# Patient Record
Sex: FEMALE | Race: WHITE | NOT HISPANIC OR LATINO
[De-identification: names, ages, dates, MRNs, and addresses within clinical notes are randomized per-mention and may not be internally consistent; named-entity substitution may affect disease eponyms.]

---

## 2017-08-17 ENCOUNTER — RX ONLY (RX ONLY)
Age: 62
End: 2017-08-17

## 2017-08-17 ENCOUNTER — APPOINTMENT (OUTPATIENT)
Dept: URBAN - METROPOLITAN AREA CLINIC 200 | Age: 62
Setting detail: DERMATOLOGY
End: 2017-08-18

## 2017-08-17 DIAGNOSIS — L30.4 ERYTHEMA INTERTRIGO: ICD-10-CM

## 2017-08-17 DIAGNOSIS — L57.8 OTHER SKIN CHANGES DUE TO CHRONIC EXPOSURE TO NONIONIZING RADIATION: ICD-10-CM

## 2017-08-17 DIAGNOSIS — L70.0 ACNE VULGARIS: ICD-10-CM

## 2017-08-17 PROBLEM — J30.1 ALLERGIC RHINITIS DUE TO POLLEN: Status: ACTIVE | Noted: 2017-08-17

## 2017-08-17 PROCEDURE — OTHER COUNSELING: OTHER

## 2017-08-17 PROCEDURE — 99213 OFFICE O/P EST LOW 20 MIN: CPT

## 2017-08-17 PROCEDURE — OTHER PRESCRIPTION MEDICATION MANAGEMENT: OTHER

## 2017-08-17 RX ORDER — NYSTATIN AND TRIAMCINOLONE ACETONIDE 100000; 1 [USP'U]/G; MG/G
CREAM TOPICAL
Qty: 1 | Refills: 3 | Status: ERX

## 2017-08-17 RX ORDER — TAZAROTENE 1 MG/G
GEL CUTANEOUS
Qty: 1 | Refills: 6 | Status: ERX

## 2017-08-17 ASSESSMENT — LOCATION SIMPLE DESCRIPTION DERM
LOCATION SIMPLE: RIGHT FOREHEAD
LOCATION SIMPLE: RIGHT UPPER BACK

## 2017-08-17 ASSESSMENT — LOCATION ZONE DERM
LOCATION ZONE: FACE
LOCATION ZONE: TRUNK

## 2017-08-17 ASSESSMENT — LOCATION DETAILED DESCRIPTION DERM
LOCATION DETAILED: RIGHT INFERIOR UPPER BACK
LOCATION DETAILED: RIGHT INFERIOR FOREHEAD

## 2018-03-06 ENCOUNTER — HOSPITAL ENCOUNTER (OUTPATIENT)
Dept: PHYSICAL THERAPY | Age: 63
Setting detail: THERAPIES SERIES
Discharge: HOME | End: 2018-03-06
Attending: FAMILY MEDICINE
Payer: COMMERCIAL

## 2018-03-06 DIAGNOSIS — M79.605 LEFT LEG PAIN: Primary | ICD-10-CM

## 2018-03-06 PROCEDURE — 97110 THERAPEUTIC EXERCISES: CPT | Mod: GP

## 2018-03-06 PROCEDURE — 97140 MANUAL THERAPY 1/> REGIONS: CPT | Mod: GP

## 2018-03-06 RX ORDER — NAPROXEN 500 MG/1
500 TABLET ORAL AS NEEDED
COMMUNITY
End: 2020-09-15

## 2018-03-07 NOTE — PROGRESS NOTES
Patient: Adeola Rees  Location: [facility] at [facility] - Physical Therapy    MRN: [id number]  Today's date: 3/6/2018    There were no vitals filed for this visit.               Treatment Summary - 03/06/18 1900        Session Details    Document Type (P)  daily treatment/progress note    Mode of Treatment (P)  individual therapy    Patient/Family Observations (P)  Stated she is feeling good today       Time Calculation    Start Time (P)  1800    Stop Time (P)  1900    Time Calculation (min) (P)  60 min       LE Sidelying Therapeutic Exercise    Comment, Sidelying Lower Extremity (P)  Hip Flexor Stretch        LE Supine Therapeutic Exercise    Comment (P)  Quad Sets, SLR 2x15 3 sec hold, Piriformis stretch        Aerobic Exercise Activity    Comment (P)  NuStep 10 min        PT Clinical Impression    Functional Limitations in Following Categories (PT Eval) (P)  community/leisure    Rehab Potential/Prognosis (P)  good, to achieve stated therapy goals    Estimated Length of Stay (P)  6 weeks    Daily Outcome Statement (P)  Continue PT       PT Weekly Outcome Summary    Functional Goal Overall Progress (P)  progressing toward functional goals as expected                 Education provided this session.   Clinical Impression: Adeola arrive to PT today feeling great, She was able to completed the NuStep for 10 min without complaint. Originally she was at seat 8, but started to feel pain in her knees. Adjusted the seat to level 9 and the pain decreased. She felt good while stretching her piriformis and hip flexors. She did have to take a rest break in between her SLR. She would benefit from continued PT for strengthening.

## 2018-03-08 ENCOUNTER — HOSPITAL ENCOUNTER (OUTPATIENT)
Dept: PHYSICAL THERAPY | Age: 63
Setting detail: THERAPIES SERIES
Discharge: HOME | End: 2018-03-08
Attending: FAMILY MEDICINE
Payer: COMMERCIAL

## 2018-03-08 DIAGNOSIS — M79.605 LEFT LEG PAIN: Primary | ICD-10-CM

## 2018-03-08 PROCEDURE — 97530 THERAPEUTIC ACTIVITIES: CPT | Mod: GP | Performed by: PHYSICAL THERAPIST

## 2018-03-08 PROCEDURE — 97110 THERAPEUTIC EXERCISES: CPT | Mod: GP

## 2018-03-08 PROCEDURE — 97110 THERAPEUTIC EXERCISES: CPT | Mod: GP | Performed by: PHYSICAL THERAPIST

## 2018-03-08 PROCEDURE — 97140 MANUAL THERAPY 1/> REGIONS: CPT | Mod: GP | Performed by: PHYSICAL THERAPIST

## 2018-03-08 NOTE — PROGRESS NOTES
Patient: Adeola Rees  Location: [facility] at [facility] - Physical Therapy    MRN: [id number]  Today's date: 3/8/2018    Vitals/Pain    03/08/18 1000   Presence of Pain: complains of pain/discomfort   Preferred Pain Scale: number (Numeric Rating Pain Scale)   Pain Body Location - Side: Left   Pain Body Location - Orientation: anterior   Pain Body Location: leg   Pain Rating (0-10): Rest: 3                  Treatment Summary - 03/08/18 1000        Session Details    Document Type daily treatment/progress note    Mode of Treatment physical therapy    Patient/Family Observations Patient reports improvement in legs strength since starting therapy.  Numbness is still the same.  Continued weakness left hip leading to difficulty with stairs and getting out of the chair.  No back pain.       Time Calculation    Start Time 1010    Stop Time 1104    Time Calculation (min) 54 min       Neuromuscular    Neuromuscular Assessments Neuromuscular Re-education (Group)       Neuromuscular Re-education    Activities/Techniques Used passive elongation    Positions Used supine;prone;side lying    Comment sciatica nerve glide on left x10; b/l femoral nerve glides 3x10; b/l piriformis strethc; b/l hip flexor ; b/l glut med stretch       Manual Muscle Testing (MMT)    General MMT Assessment lower extremity strength deficits identified       Lower Extremity    Lower Extremity: Manual Muscle Testing right LE strength is WNL;left hip strength deficit       MMT: Left Hip    Gross Movement: Flexion (5/5) normal    Gross Movement: Extension (4/5) good    Gluteus Diego: Extension (4/5) good    Gluteus Medius: ABduction (4/5) good    Comment Quads and hamstrings 4+/5       Gait Training    Comment March with pause 4x 20 feet; challenged with balance       Therapeutic Exercise    Therapeutic Exercise Aerobic Exercise Activity (Group)       LE Supine Therapeutic Exercise    Comment SLR 2 sets of 10; green band; Sidelying hip  ABDx10 pink band        Aerobic Exercise Activity    Comment Nustep seat 10 arms 10 ; 10 minutes level 1       PT Clinical Impression    Plan For Care Reviewed: Physical Therapy PT plan for care discussed with patient    System Pathology/Pathophysiology Noted musculoskeletal    Impairments Found (PT Eval) aerobic capacity/endurance;gait, locomotion, and balance;muscle performance;ROM (range of motion)   strength    Functional Limitations in Following Categories (PT Eval) home management;work;community/leisure    Rehab Potential/Prognosis good, to achieve stated therapy goals    Frequency of Treatment other (see comments)   2x/wk    Estimated Length of Stay 6 weeks    Problem List decreased flexibility;decreased ROM;decreased strength;impaired balance;pain;other (see comments)   abnormal gait    Activity Limitations Related to Problem List ambulation not performed safely       PT Weekly Outcome Summary    Functional Goal Overall Progress progressing toward functional goals as expected    Weekly Outcome Statement Patient was seen for re-assessment today and demonstrated excellent progress towards goals with negative SLR on left  today, improved LE flexibility and improved LE strength. Patient continues to present with moderate hip flexor and quadriceps strength on left leading to inability to ambulate safely and inablility to perform stairs safely.  Patient requires continued skilled PT to progress strength to decrease risk for falls.    Impairments Continuing to Limit Function decreased flexibility;decreased strength;impaired balance    Recommendations Cont PT;focus on balance, LE strength and flexibility                 Education provided this session. Disucssed progress towards goals; reviewed HEP

## 2018-03-13 ENCOUNTER — HOSPITAL ENCOUNTER (OUTPATIENT)
Dept: PHYSICAL THERAPY | Age: 63
Setting detail: THERAPIES SERIES
Discharge: HOME | End: 2018-03-13
Attending: FAMILY MEDICINE
Payer: COMMERCIAL

## 2018-03-13 DIAGNOSIS — M79.605 LEFT LEG PAIN: Primary | ICD-10-CM

## 2018-03-13 PROCEDURE — 97140 MANUAL THERAPY 1/> REGIONS: CPT | Mod: GP

## 2018-03-13 PROCEDURE — 97110 THERAPEUTIC EXERCISES: CPT | Mod: GP

## 2018-03-13 NOTE — PROGRESS NOTES
Patient: Adeola Rees  Location: [facility] - Physical Therapy    MRN: [id number]  Today's date: 3/13/2018          Pain/Vitals - 03/13/18 1100        Pain/Comfort/Sleep    Presence of Pain complains of pain/discomfort    Preferred Pain Scale number (Numeric Rating Pain Scale)    Pain Body Location - Side Left    Pain Body Location - Orientation posterior    Pain Body Location calf    Pain Rating (0-10): Rest 8                     Treatment Summary - 03/13/18 1100        Session Details    Document Type daily treatment/progress note    Mode of Treatment individual therapy    Patient/Family Observations Reports she is feeling good today but feels a burning up her left calf.        Time Calculation    Start Time (P)  1100    Stop Time (P)  1200    Time Calculation (min) (P)  60 min       Neuromuscular Re-education    Activities/Techniques Used (P)  passive elongation    Positions Used (P)  supine;prone;side lying    Comment (P)  sciatica nerve glide on left x10; b/l femoral nerve glides 3x10; b/l piriformis strethc; b/l hip flexor ; b/l glut med stretch       LE Supine Therapeutic Exercise    Comment (P)  SLR 2 sets of 10; green band; Sidelying hip ABDx10 pink band        Aerobic Exercise Activity    Comment (P)  NuStep seat 7 arms 10 Level 1 10 min .        PT Clinical Impression    Plan For Care Reviewed: Physical Therapy (P)  PT plan for care discussed with patient    System Pathology/Pathophysiology Noted (P)  musculoskeletal    Impairments Found (PT Eval) (P)  aerobic capacity/endurance;gait, locomotion, and balance    Functional Limitations in Following Categories (PT Eval) (P)  home management;work;community/leisure    Rehab Potential/Prognosis (P)  good, to achieve stated therapy goals    Frequency of Treatment (P)  other (see comments)    Estimated Length of Stay (P)  6 weeks    Problem List (P)  decreased flexibility    Activity Limitations Related to Problem List (P)   ambulation not performed safely                   Education provided this session.   Assessment: Adeola tolerated her exercises well today. Stated she was feeling a stretch while on the Nustep and was still getting the burning feeling up her left calf. Once adjust the set to level 7 the pain became minimal. She would benefit from continued PT for strengthening and flexibility gains.

## 2018-03-15 ENCOUNTER — HOSPITAL ENCOUNTER (OUTPATIENT)
Dept: PHYSICAL THERAPY | Age: 63
Setting detail: THERAPIES SERIES
Discharge: HOME | End: 2018-03-15
Attending: FAMILY MEDICINE
Payer: COMMERCIAL

## 2018-03-15 DIAGNOSIS — M79.605 LEFT LEG PAIN: Primary | ICD-10-CM

## 2018-03-15 PROCEDURE — 97140 MANUAL THERAPY 1/> REGIONS: CPT | Mod: GP | Performed by: PHYSICAL THERAPIST

## 2018-03-15 PROCEDURE — 97110 THERAPEUTIC EXERCISES: CPT | Mod: GP | Performed by: PHYSICAL THERAPIST

## 2018-03-15 NOTE — PROGRESS NOTES
Patient: Adeola Rees  Location: [facility] - Physical Therapy    MRN: [id number]  Today's date: 3/15/2018        Prior Living Environment            Prior Level of Function                   Treatment Summary - 03/15/18 1000        Session Details    Document Type daily treatment    Mode of Treatment physical therapy    Patient/Family Observations Patient reports she is improving but continues to have difficulty on stairs       Time Calculation    Start Time 1025    Stop Time 1100    Time Calculation (min) 35 min       Neuromuscular Re-education    Activities/Techniques Used passive elongation    Positions Used supine;prone;side lying    Comment sciatica nerve glide on left x10; b/l femoral nerve glides 3x10; b/l piriformis strethc; b/l hip flexor ; b/l glut med stretch; manual therapy provided by PT       Gait Training    Comment March with pause 4x 20 feet; challenged with balance       Stairs Training    Comment Step up x10; lateral step up x10; required Airex when lateral step up on left due to decreased strength (airex to decrease step depth)       LE Standing Therapeutic Exercise    Comment Hip ABD, Hip Ext standing Green band 2x10; squats into chair       LE Supine Therapeutic Exercise    Comment SLR 2 sets of 10; green band; Sidelying hip ABDx10 Green band        PT Weekly Outcome Summary    Functional Goal Overall Progress progressing toward functional goals as expected    Weekly Outcome Statement Patient demonstrated improved flexibility but continues to require neural mobility  especially femoral nerve glides.  Patient also continues to present with signficiant left > right loss of quad flexibility and continues to require manual intervention to restore ROM.  Patient unable to perform lateral step ups leading with the right LE. and requires continued focus on LE strength to improve stair negotiation    Recommendations continue to progress CKC strength                    Education provided this session. See the Patient Education summary report for full details.

## 2018-03-20 ENCOUNTER — HOSPITAL ENCOUNTER (OUTPATIENT)
Dept: PHYSICAL THERAPY | Age: 63
Setting detail: THERAPIES SERIES
Discharge: HOME | End: 2018-03-20
Attending: FAMILY MEDICINE
Payer: COMMERCIAL

## 2018-03-20 DIAGNOSIS — M79.605 LEFT LEG PAIN: Primary | ICD-10-CM

## 2018-03-20 PROCEDURE — 97112 NEUROMUSCULAR REEDUCATION: CPT | Mod: GP

## 2018-03-20 NOTE — PROGRESS NOTES
Patient: Adeola Rees  Location: [facility] - Physical Therapy    MRN: [id number]  Today's date: 3/20/2018          Pain/Vitals - 03/20/18 1100        Pain/Comfort/Sleep    Presence of Pain complains of pain/discomfort    Preferred Pain Scale number (Numeric Rating Pain Scale)    Pain Body Location - Side Bilateral    Pain Body Location - Orientation anterior    Pain Body Location leg    Pain Rating (0-10): Rest 4                PT Treatment Summary - 03/20/18 1100        Session Details    Document Type daily treatment    Mode of Treatment individual therapy    Patient/Family Observations Stated she is feeling pretty good. Having more pain her her LLE, she feels this is due to her bowels being full.  Says she needs to leave a little early because her daughter is sick.        Time Calculation    Start Time 1117    Stop Time (P)  1155    Time Calculation (min) (P)  38 min       Neuromuscular Re-education    Activities/Techniques Used passive elongation    Positions Used side lying;supine;prone    Comment sciatica nerve glide on left x10; b/l femoral nerve glides 3x10; b/l piriformis strethc; b/l hip flexor ; b/l glut med stretch; manual therapy provided by PT       Aerobic Exercise Activity    Comment (P)  NuStep Seat 9. 10 min        PT Weekly Outcome Summary    Functional Goal Overall Progress progressing toward functional goals as expected    Weekly Outcome Statement (P)  Adeola arrived to PT today feeling good overall. Did have some discomfort in her right knee during the Nustep but stated she would be able to tolerate it. Only had enough time to perform stretches today due to pt being late and having to leave early for her daughters doctors appointment. She tolerated all stretches well with no complaints of pain. She would benefit from continued PT for flexibility and strengthening.              Goals     • LTG PT            Long Term Goals Time Frame Result Progress   Left LE  MMT to 5/5 8 weeks       Patient will able to perform moderate lifting activities without pain 8 weeks     Patient will be able to squat to  item from floor 8 weeks     FGA  26/30 for decreased fall risk  8 weeks     I in ADLs 8 weeks     Patient will perform stairs reciprocally by DC 8 weeks               • STG PT              Short Term Goals Time Frame Result Progress   Patient will be educated into initial HEP 4 weeks met    All STG met in pervious documentation system   See last re-evaluation

## 2018-03-27 ENCOUNTER — HOSPITAL ENCOUNTER (OUTPATIENT)
Dept: PHYSICAL THERAPY | Age: 63
Setting detail: THERAPIES SERIES
Discharge: HOME | End: 2018-03-27
Attending: FAMILY MEDICINE
Payer: COMMERCIAL

## 2018-03-27 DIAGNOSIS — M79.605 LEFT LEG PAIN: Primary | ICD-10-CM

## 2018-03-27 PROCEDURE — 97112 NEUROMUSCULAR REEDUCATION: CPT | Mod: GP

## 2018-03-27 PROCEDURE — 97110 THERAPEUTIC EXERCISES: CPT | Mod: GP

## 2018-03-27 NOTE — PROGRESS NOTES
Patient: Adeola Rees  Location: [facility] - Physical Therapy    MRN: [id number]  Today's date: 3/27/2018          Pain/Vitals - 03/27/18 1100        Pain/Comfort/Sleep    Presence of Pain denies                PT Treatment Summary - 03/27/18 1100        Time Calculation    Start Time (P)  1103       Neuromuscular Re-education    Comment (P)  sciatica nerve glide on left x10; b/l femoral nerve glides 3x10; b/l piriformis strethc; b/l hip flexor ; b/l glut med stretch.        LE Standing Therapeutic Exercise    Comment (P)  Hip ABD, Hip Ext standing Green band 2x10; squats into chair        Aerobic Exercise Activity    Comment (P)  [employer] recumbent bike 10 min,        PT Weekly Outcome Summary    Functional Goal Overall Progress (P)  progressing toward functional goals as expected    Weekly Outcome Statement (P)  Adeola arrived to PT today for the first time without pain. She tolerated all stretches and exercises well. Stated she started to have muscle fatigue with Hip abd and ext with GTB but was able to complete. She would benefit from continued PT for strengthening.

## 2018-03-29 ENCOUNTER — HOSPITAL ENCOUNTER (OUTPATIENT)
Dept: PHYSICAL THERAPY | Age: 63
Setting detail: THERAPIES SERIES
Discharge: HOME | End: 2018-03-29
Attending: FAMILY MEDICINE
Payer: COMMERCIAL

## 2018-03-29 DIAGNOSIS — M79.605 LEFT LEG PAIN: Primary | ICD-10-CM

## 2018-03-29 PROCEDURE — 97530 THERAPEUTIC ACTIVITIES: CPT | Mod: GP | Performed by: PHYSICAL THERAPIST

## 2018-03-29 PROCEDURE — 97140 MANUAL THERAPY 1/> REGIONS: CPT | Mod: GP | Performed by: PHYSICAL THERAPIST

## 2018-03-29 PROCEDURE — 97110 THERAPEUTIC EXERCISES: CPT | Mod: GP | Performed by: PHYSICAL THERAPIST

## 2018-03-29 NOTE — LETTER
[address]  [place] [address]  [facility] Therapy Fax: [phone]    PHYSICAL THERAPY PLAN OF CARE    By signing this plan of care, I certify this plan of care as correct and necessary for the patient.        Physician Signature: _________________________________________ Date: _________________      Patient Name: Adeola Rees        Provider: Dagmar Doyle PT  Referring Provider: Bryce Allen DO  PCP: Bryce Allen DO  Payor: Payor: [employer] / Plan: [employer] POS / Product Type: Managed Care /   Medical Diagnosis: Left leg pain [M79.605]          Frequency: other (see comments) (1-2x/wk)  Duration: 6 weeks    Certification dates:  From:  3/29/18 TO:  5/24/18      Clinical Impression: Patient was seen for re-assessment and demonstrated progress since the SOC with increased strength, increased flexibility and improved ambulation tolernace. Patient continues to be challenged with significant left LE weaness leading to difficulty with stair negoatiation.  Patient continues to present as a fall risk based on FGA and requies continued focus on strength and flexibility as well as balance and gait to improve ambulation tolerance and to decrease risk for fall.  Patient may benefit from further imaging of the femoral nerve due to persistent weakness and unusual gait pattern with episodes of knee buckling without fall.     Patient Complaints and Deficits: Weakness and pain    Plan and Interventions: The patient's treatment will include gait training, joint and soft tissue mobilization, manual therapy, neuromuscular re-education, physical reconditioning, therapeutic activities, therapeutic exercises and attended E-Stim as needed and modalities as needed for pain       Rehab Potential: good, to achieve stated therapy goals      Patient: Adeola Rees  Location: [facility] - Physical Therapy    MRN: [id number]  Today's date: 3/29/2018          Pain/Vitals -  03/29/18 1000        Pain/Comfort/Sleep    Presence of Pain denies                        PT Treatment Summary - 03/29/18 1000        Session Details    Document Type re-evaluation    Mode of Treatment physical therapy;individual therapy    Patient/Family Observations Left leg continues to feel weak.  Still having difficulty getting out of a chair without UE support.  Compliant with HEP (squats); Left thigh pain can be 10/10 if touched, 0/10 at rest.  Left anterior shin is numb and left leg continues to feel heavy. Patient reports improvement in stair negotiation but continues to require rail.  Patient requires shopping cart to shop due to weakness in the left leg.       Time Calculation    Start Time 1010    Stop Time 1105    Time Calculation (min) 55 min       Neuromuscular Re-education    Comment sciatica nerve glide on left x10; b/l femoral nerve glides 3x10; b/l piriformis strethc; b/l hip flexor ; b/l glut med stretch. ; manual stretch performed by PT Janet       Range of Motion (ROM)    Comment, General Range of Motion Right quad 110 degree; Left quad 100 degrees       Lower Extremity    Lower Extremity: Manual Muscle Testing left knee strength deficit    Comment Right LE 5/5 except hip ext 3/5       MMT: Left Hip    Gross Movement: Flexion (5/5) normal   muscle cramp reported    Gross Movement: Extension (2+/5) poor plus    Gross Movement: Hip ABduction (4/5) good    Gross Movement: Internal (Medial) Rotation (3+/5) fair plus    Gross Movement: External (Lateral) Rotation (3+/5) fair plus       MMT: Left Knee    Gross Movement: Extension (3+/5) fair plus    Hamstrings: Flexion (4-/5) good minus       Gait Training    Comment March with pause 4x 20 feet; challenged with balance;; ; retro walk; lateral walk 4x 20 feetFGA; 18/30       LE Sidelying Therapeutic Exercise    Comment, Sidelying Lower Extremity s/l hip ABD 20 x       Aerobic Exercise Activity    Time 10    Comment Nustep level 2; seat 9 arms 8        PT Clinical Impression    Plan For Care Reviewed: Physical Therapy PT plan for care discussed with patient    System Pathology/Pathophysiology Noted musculoskeletal    Impairments Found (PT Eval) other (see comments)   decreased strength, decreased flexibility, abnormal gait    Functional Limitations in Following Categories (PT Eval) home management    Rehab Potential/Prognosis good, to achieve stated therapy goals    Frequency of Treatment other (see comments)   1-2x/wk    Estimated Length of Stay 6 weeks    Problem List pain;decreased ROM;decreased flexibility;decreased strength;impaired balance;other (see comments)   abnormal gait    Activity Limitations Related to Problem List ambulation not performed safely    Patient/Family Concerns Related to Equipment Needs Patient was seen for re-assessment and demonstrated progress since the SOC with increased strength, increased flexibility and improved ambulation tolernace. Patient continues to be challenged with significant left LE weaness leading to difficulty with stair negoatiation.  Patient continues to present as a fall risk based on FGA and requies continued focus on strength and flexibility as well as balance and gait to improve ambulation tolerance and to decrease risk for fall.                   Education provided this session. Discussed progress towards goals       Goals     None                  Thank you for this referral. Please contact our department with any questions.        Thank you,   Dagmar Doyle, PT, DPT, CERT MDT

## 2018-03-29 NOTE — PROGRESS NOTES
Patient: Adeola Rees  Location: [facility] - Physical Therapy    MRN: [id number]  Today's date: 3/29/2018          Pain/Vitals - 03/29/18 1000        Pain/Comfort/Sleep    Presence of Pain denies                        PT Treatment Summary - 03/29/18 1000        Session Details    Document Type re-evaluation    Mode of Treatment physical therapy;individual therapy    Patient/Family Observations Left leg continues to feel weak.  Still having difficulty getting out of a chair without UE support.  Compliant with HEP (squats); Left thigh pain can be 10/10 if touched, 0/10 at rest.  Left anterior shin is numb and left leg continues to feel heavy. Patient reports improvement in stair negotiation but continues to require rail.  Patient requires shopping cart to shop due to weakness in the left leg.       Time Calculation    Start Time 1010    Stop Time 1105    Time Calculation (min) 55 min       Neuromuscular Re-education    Comment sciatica nerve glide on left x10; b/l femoral nerve glides 3x10; b/l piriformis strethc; b/l hip flexor ; b/l glut med stretch. ; manual stretch performed by PT Janet       Range of Motion (ROM)    Comment, General Range of Motion Right quad 110 degree; Left quad 100 degrees       Lower Extremity    Lower Extremity: Manual Muscle Testing left knee strength deficit    Comment Right LE 5/5 except hip ext 3/5       MMT: Left Hip    Gross Movement: Flexion (5/5) normal   muscle cramp reported    Gross Movement: Extension (2+/5) poor plus    Gross Movement: Hip ABduction (4/5) good    Gross Movement: Internal (Medial) Rotation (3+/5) fair plus    Gross Movement: External (Lateral) Rotation (3+/5) fair plus       MMT: Left Knee    Gross Movement: Extension (3+/5) fair plus    Hamstrings: Flexion (4-/5) good minus       Gait Training    Comment March with pause 4x 20 feet; challenged with balance;; ; retro walk; lateral walk 4x 20 feetFGA; 18/30       LE  Sidelying Therapeutic Exercise    Comment, Sidelying Lower Extremity s/l hip ABD 20 x       Aerobic Exercise Activity    Time 10    Comment Nustep level 2; seat 9 arms 8       PT Clinical Impression    Plan For Care Reviewed: Physical Therapy PT plan for care discussed with patient    System Pathology/Pathophysiology Noted musculoskeletal    Impairments Found (PT Eval) other (see comments)   decreased strength, decreased flexibility, abnormal gait    Functional Limitations in Following Categories (PT Eval) home management    Rehab Potential/Prognosis good, to achieve stated therapy goals    Frequency of Treatment other (see comments)   1-2x/wk    Estimated Length of Stay 6 weeks    Problem List pain;decreased ROM;decreased flexibility;decreased strength;impaired balance;other (see comments)   abnormal gait    Activity Limitations Related to Problem List ambulation not performed safely    Patient/Family Concerns Related to Equipment Needs Patient was seen for re-assessment and demonstrated progress since the SOC with increased strength, increased flexibility and improved ambulation tolernace. Patient continues to be challenged with significant left LE weaness leading to difficulty with stair negoatiation.  Patient continues to present as a fall risk based on FGA and requies continued focus on strength and flexibility as well as balance and gait to improve ambulation tolerance and to decrease risk for fall.                   Education provided this session. Discussed progress towards goals       Goals     None

## 2018-04-03 ENCOUNTER — HOSPITAL ENCOUNTER (OUTPATIENT)
Dept: PHYSICAL THERAPY | Age: 63
Setting detail: THERAPIES SERIES
Discharge: HOME | End: 2018-04-03
Attending: FAMILY MEDICINE
Payer: COMMERCIAL

## 2018-04-03 DIAGNOSIS — M79.605 LEFT LEG PAIN: Primary | ICD-10-CM

## 2018-04-03 PROCEDURE — 97112 NEUROMUSCULAR REEDUCATION: CPT | Mod: GP

## 2018-04-03 PROCEDURE — 97110 THERAPEUTIC EXERCISES: CPT | Mod: GP

## 2018-04-03 NOTE — PROGRESS NOTES
"Patient: Adeola Rees  Location: [facility] - Physical Therapy    MRN: [id number]  Today's date: 4/3/2018        PT Treatment Summary - 04/03/18 1300        Session Details    Document Type daily treatment    Mode of Treatment physical therapy    Patient/Family Observations Running late today. Feeling like she is \"dragging\" today.        Time Calculation    Start Time 1322    Stop Time 1402    Time Calculation (min) 40 min       Neuromuscular Re-education    Comment sciatica nerve glide on left x10; b/l femoral nerve glides 3x10; b/l piriformis strethc; b/l hip flexor ; b/l glut med stretch..        Aerobic Exercise Activity    Time 10    Comment Nustep level 2; seat 9 arms 8       PT Weekly Outcome Summary    Functional Goal Overall Progress progressing toward functional goals as expected    Weekly Outcome Statement Arrived to PT late today tolerated all stretches without complaints of pain. Unable to complete further exercises due to shortend time. She would benefit from continued PT for strengthening.                   "

## 2018-04-05 ENCOUNTER — HOSPITAL ENCOUNTER (OUTPATIENT)
Dept: PHYSICAL THERAPY | Age: 63
Setting detail: THERAPIES SERIES
Discharge: HOME | End: 2018-04-05
Attending: FAMILY MEDICINE
Payer: COMMERCIAL

## 2018-04-05 DIAGNOSIS — M79.605 LEFT LEG PAIN: Primary | ICD-10-CM

## 2018-04-05 PROCEDURE — 97112 NEUROMUSCULAR REEDUCATION: CPT | Mod: GP

## 2018-04-05 PROCEDURE — 97110 THERAPEUTIC EXERCISES: CPT | Mod: GP

## 2018-04-05 NOTE — PROGRESS NOTES
Patient: Adeola Rees  Location: [facility] at [facility] - Physical Therapy    MRN: [id number]  Today's date: 4/5/2018    Denies pain           PT Treatment Summary - 04/05/18 1200        Session Details    Document Type daily treatment    Mode of Treatment physical therapy    Patient/Family Observations Running late. Stated her mind is great but her leg is good. Weak and doesnt want to work.        Time Calculation    Start Time 1212    Stop Time (P)  1303    Time Calculation (min) (P)  51 min       Neuromuscular Re-education    Comment sciatica nerve glide on left x10; b/l femoral nerve glides 3x10; b/l piriformis strethc; b/l hip flexor ; b/l glut med stretch..        LE Sidelying Therapeutic Exercise    Comment, Sidelying Lower Extremity s/l hip ABD 20x       Aerobic Exercise Activity    Time 10    Comment Nustep level 2; seat 9 arms 8       PT Weekly Outcome Summary    Functional Goal Overall Progress progressing toward functional goals as expected    Weekly Outcome Statement (P)  Arrived to PT late. Pt stated upon arrival that she hasnt eaten a thing since Tuesday except for water. Stated this is due to fasting for her Hindu. Also stated she had plans of eating eggs this morning but got in to a verbal altercation with one of her freinds on the phone and afterwards decided she didnt feel like eating. Checked pts BP and it was higher than expected. Adeola explained that it may be due to the coffee she had and the Nicotine gum shes chewing. Discussed the following concerns with Primary PT. Had discussion with Adeola about proper nutrition and why its important for the body to have food. Stated she feels better when her stomach is empty because when her bowels arent full she has no pain in her left leg. Adeola was able to complete all of her stretches without complaints of pain. As well as completing her Hip Abd exercise. She would benefit from continued PT for strengthening.      Recommendations (P)  Continue PT

## 2018-04-10 ENCOUNTER — HOSPITAL ENCOUNTER (OUTPATIENT)
Dept: PHYSICAL THERAPY | Age: 63
Setting detail: THERAPIES SERIES
Discharge: HOME | End: 2018-04-10
Attending: FAMILY MEDICINE
Payer: COMMERCIAL

## 2018-04-10 DIAGNOSIS — M79.605 LEFT LEG PAIN: Primary | ICD-10-CM

## 2018-04-10 PROCEDURE — 97110 THERAPEUTIC EXERCISES: CPT | Mod: GP

## 2018-04-10 PROCEDURE — 97112 NEUROMUSCULAR REEDUCATION: CPT | Mod: GP

## 2018-04-10 NOTE — PROGRESS NOTES
Patient: Adeola Rees  Location: [facility] - Physical Therapy    MRN: [id number]  Today's date: 4/10/2018          Pain/Vitals - 04/10/18 1100        Pain/Comfort/Sleep    Presence of Pain denies              PT Treatment Summary - 04/10/18 1000        Session Details    Document Type daily treatment    Mode of Treatment physical therapy    Patient/Family Observations Good mentally and gimpy on my legs. Everything is the same.  Had a good breakfast this morning.         Time Calculation    Start Time 1022    Stop Time 1121    Time Calculation (min) 59 min       Neuromuscular Re-education    Comment sciatica nerve glide on left x10; b/l femoral nerve glides 3x10; b/l piriformis strethc; b/l hip flexor ; b/l glut med stretch..        LE Sidelying Therapeutic Exercise    Comment, Sidelying Lower Extremity s/l hip ABD 20x       LE Standing Therapeutic Exercise    Comment Hip ABD, Hip Ext standing Green band 2x10; squats into chair, Stair climbing x4, Lateral step downs 2x10       Aerobic Exercise Activity    Time 10    Comment Nustep level 2; seat 9 arms 8       PT Weekly Outcome Summary    Functional Goal Overall Progress progressing toward functional goals as expected    Weekly Outcome Statement (P)  Arrived to PT late, no excuse given. Having a difficult time treating patient due to her consistantly arriving late. Explained to Bharat the importance of arriving to her PT session on time to maximize her benefit from PT. Did not have a scheduled patient after Adeola, was able to fit in full session today. Admitted she is not being as compliant with her HEP and she should be. Continues to show weakness in her Left LE. After completing step downs she stated her knee felt gimpy and started to give out. Felt it was over worked. Stopped session to prevent furhter muscle fatigue. Asked patient if she needed assistance out to her car. She stated she was fine and whenever this happens she  needs to just stand still for a moment. She would benefit from continued PT for strengthening.     Recommendations Continue PT

## 2018-04-12 ENCOUNTER — HOSPITAL ENCOUNTER (OUTPATIENT)
Dept: PHYSICAL THERAPY | Age: 63
Setting detail: THERAPIES SERIES
Discharge: HOME | End: 2018-04-12
Attending: FAMILY MEDICINE
Payer: COMMERCIAL

## 2018-04-12 DIAGNOSIS — M79.605 LEFT LEG PAIN: Primary | ICD-10-CM

## 2018-04-12 PROCEDURE — 97140 MANUAL THERAPY 1/> REGIONS: CPT | Mod: GP | Performed by: PHYSICAL THERAPIST

## 2018-04-12 PROCEDURE — 97110 THERAPEUTIC EXERCISES: CPT | Mod: GP | Performed by: PHYSICAL THERAPIST

## 2018-04-12 PROCEDURE — 97530 THERAPEUTIC ACTIVITIES: CPT | Mod: GP,59 | Performed by: PHYSICAL THERAPIST

## 2018-04-12 NOTE — PROGRESS NOTES
"Patient: Adeola Rees  Location: [facility] - Physical Therapy    MRN: [id number]  Today's date: 4/12/2018          Pain/Vitals - 04/12/18 1400        Pain/Comfort/Sleep    Presence of Pain denies                PT Treatment Summary - 04/12/18 1400        Session Details    Document Type daily treatment    Mode of Treatment physical therapy;individual therapy    Patient/Family Observations Patient reports continued heaviness left LE.  \"Leg feels gimpy\" . Worse since Tuesday.  Having difficulty with stairs over the last few days.        Time Calculation    Start Time 1402    Stop Time 1502    Time Calculation (min) 60 min       Neuromuscular Re-education    Comment femoral nerve glide on left 3x10; hip flexor stretch in sidelying; piriformis stretch all on left; IASTM left quad: STM left quad; perfromed by Dagmar PT 25 minute       Gait Training    Comment March with pause 4x 20 feet       Stairs Training    Comment Step up x10; lateral step up x10; required Airex when lateral step up on left due to decreased strength (airex to decrease step depth; able to ascend stairs reciprocally without rail; step to pattern for descent       LE Sidelying Therapeutic Exercise    Comment, Sidelying Lower Extremity s/l hip ABD with ext x15; sidelying clams pink band x20 (left leg on top)       LE Standing Therapeutic Exercise    Comment Squats into chairs 20x; tKE pink band x20 LEft LE       LE Supine Therapeutic Exercise    Comment Bridge with hip abduction (pink band) x20       Aerobic Exercise Activity    Time 10    Comment Nustep level 2; seat 9 arms 8       PT Weekly Outcome Summary    Functional Goal Overall Progress progressing toward functional goals as expected    Weekly Outcome Statement Patient responded well to IASTM to left quad with decreased pain reported. Patient demnstrated improved ability on the stairs with reciprocal gait on the stair for ascent for the first time, required " step to pattern for descent. Patient requires continued focus on strength progression to improve ability on stairs.    Recommendations Continue soft tissue to left quad; progress strength                   Education provided this session. Encouraged patient to continue with HEP and f/u with MD       Goals     None

## 2018-04-17 ENCOUNTER — HOSPITAL ENCOUNTER (OUTPATIENT)
Dept: PHYSICAL THERAPY | Age: 63
Setting detail: THERAPIES SERIES
Discharge: HOME | End: 2018-04-17
Attending: FAMILY MEDICINE
Payer: COMMERCIAL

## 2018-04-17 ENCOUNTER — OFFICE VISIT (OUTPATIENT)
Dept: PRIMARY CARE | Facility: CLINIC | Age: 63
End: 2018-04-17
Payer: COMMERCIAL

## 2018-04-17 VITALS
HEIGHT: 67 IN | RESPIRATION RATE: 12 BRPM | WEIGHT: 184.4 LBS | HEART RATE: 74 BPM | DIASTOLIC BLOOD PRESSURE: 80 MMHG | BODY MASS INDEX: 28.94 KG/M2 | OXYGEN SATURATION: 98 % | SYSTOLIC BLOOD PRESSURE: 116 MMHG

## 2018-04-17 DIAGNOSIS — M79.605 LEFT LEG PAIN: Primary | ICD-10-CM

## 2018-04-17 DIAGNOSIS — M79.605 LEFT LEG PAIN: ICD-10-CM

## 2018-04-17 DIAGNOSIS — R20.0 LEFT LEG NUMBNESS: Primary | ICD-10-CM

## 2018-04-17 PROCEDURE — 99214 OFFICE O/P EST MOD 30 MIN: CPT | Performed by: FAMILY MEDICINE

## 2018-04-17 PROCEDURE — 97110 THERAPEUTIC EXERCISES: CPT | Mod: GP

## 2018-04-17 PROCEDURE — 97112 NEUROMUSCULAR REEDUCATION: CPT | Mod: GP

## 2018-04-17 ASSESSMENT — ENCOUNTER SYMPTOMS
CARDIOVASCULAR NEGATIVE: 1
GASTROINTESTINAL NEGATIVE: 1
LEG PAIN: 1
EYES NEGATIVE: 1
CONSTITUTIONAL NEGATIVE: 1
MUSCULOSKELETAL NEGATIVE: 1
RESPIRATORY NEGATIVE: 1
NUMBNESS: 1

## 2018-04-17 NOTE — PATIENT INSTRUCTIONS
Patient Education   Pain Without a Known Cause  Pain can occur in any part of the body and can range from mild to severe. Sometimes no cause can be found for why you are having pain. Some types of pain that can occur without a known cause include:  · Headache.  · Back pain.  · Abdominal pain.  · Neck pain.  How is this diagnosed?  Your health care provider will try to find the cause of your pain. This may include:  · Physical exam.  · Medical history.  · Blood tests.  · Urine tests.  · X-rays.  If no cause is found, your health care provider may diagnose you with pain without a known cause.  How is this treated?  Treatment depends on the kind of pain you have. Your health care provider may prescribe medicines to help relieve your pain.  Follow these instructions at home:  · Take medicines only as directed by your health care provider.  · Stop any activities that cause pain. During periods of severe pain, bed rest may help.  · Try to reduce your stress with activities such as yoga or meditation. Talk to your health care provider for other stress-reducing activity recommendations.  · Exercise regularly, if approved by your health care provider.  · Eat a healthy diet that includes fruits and vegetables. This may improve pain. Talk to your health care provider if you have any questions about your diet.  Contact a health care provider if:  If you have a painful condition and no reason can be found for the pain or the pain gets worse, it is important to follow up with your health care provider. It may be necessary to repeat tests and look further for a possible cause.  This information is not intended to replace advice given to you by your health care provider. Make sure you discuss any questions you have with your health care provider.  Document Released: 09/12/2002 Document Revised: 05/25/2017 Document Reviewed: 05/05/2015  Elsevier Interactive Patient Education © 2018 [employer]

## 2018-04-17 NOTE — PROGRESS NOTES
Patient: Adeola Rees  Location: [facility] - Physical Therapy    MRN: [id number]  Today's date: 4/17/2018          Pain/Vitals - 04/17/18 1400        Pain/Comfort/Sleep    Presence of Pain complains of pain/discomfort    Preferred Pain Scale number (Numeric Rating Pain Scale)    Pain Body Location - Side Left    Pain Body Location leg    Pain Rating (0-10): Activity 4                PT Treatment Summary - 04/17/18 1400        Session Details    Document Type daily treatment    Mode of Treatment physical therapy;individual therapy    Patient/Family Observations Forgot about her appointment this morning and moved to 2pm. Just came back from the Primary Care Doctor. Stated he would like her to see a Neurologist Dr. Nieves at [place]. Cut all of her socks due to feeling like theres a very tight rubber band around her leg. Didnt do her exercises yesterday.        Time Calculation    Start Time 1359    Stop Time 1502    Time Calculation (min) 63 min       Neuromuscular Re-education    Comment femoral nerve glide on left 3x10; hip flexor stretch in sidelying; piriformis stretch all on left; IASTM left quad: STM left quad;       LE Sidelying Therapeutic Exercise    Comment, Sidelying Lower Extremity s/l hip ABD with ext x15; sidelying clams pink band x20 (left leg on top)       LE Standing Therapeutic Exercise    Comment Squats into chairs 20x; tKE pink band x20 LEft LE       LE Supine Therapeutic Exercise    Comment Bridge with hip abduction (pink band) x20       Aerobic Exercise Activity    Time 10    Comment Nustep level 2; seat 9 arms 8       PT Weekly Outcome Summary    Functional Goal Overall Progress progressing toward functional goals as expected    Weekly Outcome Statement (P)  Adeola arrived to PT today with increased pain in her Left lower leg. Completed her warm up on the NuStep without complaints of pain. Tolerated light pressure with IASTM with edge tool to Left quad to try  and loosen up muscle. Patient verbalized she was in no pain durring this process. Continued with some table exercises before moving to standing closed chain. After getting up from the table Adeola presented with a limp of her LLE giving out on her. Discussed with Primary PT who stated to continue exercises due to her need for strength. Tolerated all other exercises without complaint. She would benefit from continued PT to prevent further pain.

## 2018-04-17 NOTE — PROGRESS NOTES
"Subjective      Patient ID: Adeolatriny Rees is a 62 y.o. female.    Leg Pain    Associated symptoms include numbness.   Still has weakness and numbness in her left lower leg and weakness in the hip.States that her progress has plateaued.        The following have been reviewed and updated as appropriate in this visit:  Tobacco  Allergies  Meds  Med Hx  Surg Hx  Fam Hx  Soc Hx      Review of Systems   Constitutional: Negative.    HENT: Negative.    Eyes: Negative.    Respiratory: Negative.    Cardiovascular: Negative.    Gastrointestinal: Negative.    Genitourinary: Negative.    Musculoskeletal: Negative.    Neurological: Positive for numbness.     Active Ambulatory Problems     Diagnosis Date Noted   • Left leg pain 03/13/2018   • Left leg numbness 04/17/2018     Resolved Ambulatory Problems     Diagnosis Date Noted   • No Resolved Ambulatory Problems     Past Medical History:   Diagnosis Date   • Osteoarthritis        Current Outpatient Prescriptions:   •  naproxen (NAPROSYN) 500 mg tablet, Take 500 mg by mouth as needed.  , Disp: , Rfl:   Allergies   Allergen Reactions   • Ciprofibrate    • Ciprofloxacin      Other reaction(s): Muscular pain   • Penicillin G    • Penicillins      Other reaction(s): Anaphylaxis   • Shellfish Derived Hives   • Sulfa (Sulfonamide Antibiotics)        Objective     Vitals:    04/17/18 1223   BP: 116/80   BP Location: Right upper arm   Patient Position: Sitting   Pulse: 74   Resp: 12   SpO2: 98%   Weight: 83.6 kg (184 lb 6.4 oz)   Height: 1.689 m (5' 6.5\")     Body mass index is 29.32 kg/m².    Physical Exam    Assessment/Plan   Problem List Items Addressed This Visit     Left leg pain    Relevant Orders    Ambulatory referral to Neurology    Left leg numbness - Primary    Relevant Orders    Ambulatory referral to Neurology          "

## 2018-04-18 NOTE — ADDENDUM NOTE
Encounter addended by: Dagmar Doyle PT on: 4/18/2018 11:59 AM<BR>    Actions taken: Letter status changed

## 2018-04-19 ENCOUNTER — HOSPITAL ENCOUNTER (OUTPATIENT)
Dept: PHYSICAL THERAPY | Age: 63
Setting detail: THERAPIES SERIES
Discharge: HOME | End: 2018-04-19
Attending: FAMILY MEDICINE
Payer: COMMERCIAL

## 2018-04-19 DIAGNOSIS — M79.605 LEFT LEG PAIN: Primary | ICD-10-CM

## 2018-04-19 PROCEDURE — 97110 THERAPEUTIC EXERCISES: CPT | Mod: GP

## 2018-04-19 NOTE — PROGRESS NOTES
"Patient: Adeola Rees  Location: [facility] - Physical Therapy    MRN: [id number]  Today's date: 4/19/2018          Pain/Vitals - 04/19/18 1000        Pain/Comfort/Sleep    Presence of Pain complains of pain/discomfort    Preferred Pain Scale number (Numeric Rating Pain Scale)    Pain Body Location - Side Left    Pain Body Location leg    Pain Rating (0-10): Activity 8                PT Treatment Summary - 04/19/18 1000        Session Details    Document Type daily treatment    Mode of Treatment physical therapy    Patient/Family Observations In a lot of pain today, Feels it is coming from the lateral left knee. Her knee has been \"gimpy\".       Time Calculation    Start Time 1010    Stop Time 1102    Time Calculation (min) 52 min       Neuromuscular Re-education    Comment femoral nerve glide on left 3x10; hip flexor stretch in sidelying; piriformis stretch all on left; IASTM left quad: STM left quad;       LE Sidelying Therapeutic Exercise    Comment, Sidelying Lower Extremity s/l hip ABD with ext x15; sidelying clams pink band x20 (left leg on top)       LE Standing Therapeutic Exercise    Comment Squats into chairs 20x; tKE pink band x20 LEft LE       LE Supine Therapeutic Exercise    Comment Bridge with hip abduction (pink band) x20       Aerobic Exercise Activity    Time 10    Comment Nustep level 2; seat 9 arms 8       PT Weekly Outcome Summary    Functional Goal Overall Progress progressing toward functional goals as expected    Weekly Outcome Statement (P)  Adeola arrived with increased pain today, She stated she feels this is from working out so hard the previous session.  She also stated her leg has been gimpy ever since leaving the last session. Will discuss with Primary PT.  Tolerated IASTM well verbalized that she was in no pain durring the application. Completed all of her exercises well. Stated she was not going to perform them to the best of her ability. Due to fear " of making her leg more gimpy. She would benefit from continued PT for strengthening.

## 2018-04-24 ENCOUNTER — HOSPITAL ENCOUNTER (OUTPATIENT)
Dept: PHYSICAL THERAPY | Age: 63
Setting detail: THERAPIES SERIES
Discharge: HOME | End: 2018-04-24
Attending: FAMILY MEDICINE
Payer: COMMERCIAL

## 2018-04-24 DIAGNOSIS — M79.605 LEFT LEG PAIN: Primary | ICD-10-CM

## 2018-04-24 PROCEDURE — 97110 THERAPEUTIC EXERCISES: CPT | Mod: GP

## 2018-04-24 PROCEDURE — 97112 NEUROMUSCULAR REEDUCATION: CPT | Mod: GP

## 2018-04-24 NOTE — PROGRESS NOTES
Patient: Adeola Rees  Location: [facility] - Physical Therapy    MRN: [id number]  Today's date: 4/24/2018    Pain 0/10          PT Treatment Summary - 04/24/18 0900        Session Details    Document Type daily treatment    Mode of Treatment physical therapy    Patient/Family Observations Leg is feeling gimpy today, brought a cane because she feels she needs it.        Time Calculation    Start Time 0959    Stop Time (P)  1100    Time Calculation (min) (P)  61 min       Neuromuscular Re-education    Comment femoral nerve glide on left 3x10; hip flexor stretch in sidelying; piriformis stretch all on left; IASTM left quad: STM left quad       LE Sidelying Therapeutic Exercise    Comment, Sidelying Lower Extremity s/l hip ABD with ext x20; sidelying clams with manual resistance x20       LE Standing Therapeutic Exercise    Comment Squats into chairs 20x; tKE pink band x20 LEft LE       LE Supine Therapeutic Exercise    Comment Bridge with hip abduction (pink band) x20       Aerobic Exercise Activity    Time 10    Comment Nustep level 2; seat 9 arms 8       PT Weekly Outcome Summary    Functional Goal Overall Progress progressing toward functional goals as expected    Weekly Outcome Statement (P)  Adeola arrived using her cane this morning stating that her leg has been feeling very gimpy. Will alert primary PT.  Tolerated NuStep and exercises well. Also tolerated IASTM stated she was having no pain during application. Continues to present with weakness. Is going to call the Neurologist to make and appointment soon. Would benefit from continued PT.

## 2018-04-27 ENCOUNTER — HOSPITAL ENCOUNTER (OUTPATIENT)
Dept: PHYSICAL THERAPY | Age: 63
Setting detail: THERAPIES SERIES
Discharge: HOME | End: 2018-04-27
Attending: FAMILY MEDICINE
Payer: COMMERCIAL

## 2018-04-27 DIAGNOSIS — M79.605 LEFT LEG PAIN: Primary | ICD-10-CM

## 2018-04-27 PROCEDURE — 97110 THERAPEUTIC EXERCISES: CPT | Mod: GP

## 2018-05-03 ENCOUNTER — HOSPITAL ENCOUNTER (OUTPATIENT)
Dept: PHYSICAL THERAPY | Age: 63
Setting detail: THERAPIES SERIES
Discharge: HOME | End: 2018-05-03
Attending: FAMILY MEDICINE
Payer: COMMERCIAL

## 2018-05-03 DIAGNOSIS — M79.605 LEFT LEG PAIN: Primary | ICD-10-CM

## 2018-05-03 PROCEDURE — 97110 THERAPEUTIC EXERCISES: CPT | Mod: GP

## 2018-05-03 PROCEDURE — 97530 THERAPEUTIC ACTIVITIES: CPT | Mod: GP

## 2018-05-03 NOTE — PROGRESS NOTES
"Patient: Adeola Rees  Location: [facility] - Physical Therapy    MRN: [id number]  Today's date: 5/3/2018          Pain/Vitals - 05/03/18 1500        Pain/Comfort/Sleep    Presence of Pain denies          Prior Living Environment            Prior Level of Function              PT Treatment Summary - 05/03/18 1200        Session Details    Document Type discharge evaluation    Mode of Treatment individual therapy;physical therapy    Patient/Family Observations Patient reporting that she wants to discharge and has an appointment scheduled with neurologist on Monday.  She reports she continues to have difficulty with transitional movements and reports a feeling that L LE will  \"give\".       Time Calculation    Start Time 1205    Stop Time 1300    Time Calculation (min) 55 min       Light Touch Sensation Assessment    Left Lower Extremity: Light Touch Sensation Assessment other (see comments)    Additional Details: Light Touch Sensation Assessment decreased sensation to LT in L3-L4 and L4-L5 dermatomes; reflexes - L=+1, R= 2+ DTR of patellar tendon       MMT: Left Hip    Gross Movement: Flexion (3+/5) fair plus    Gross Movement: Extension (3+/5) fair plus    Gross Movement: Hip ABduction (3+/5) fair plus       MMT: Left Knee    Gross Movement: Extension (4/5) good    Gross Movement: Flexion (4/5) good       LE Sidelying Therapeutic Exercise    Comment, Sidelying Lower Extremity s/l hip ABD with ext x20; sidelying clams x20;         LE Standing Therapeutic Exercise    Comment STS 1x10       Aerobic Exercise Activity    Exercise(s) Performed stepper, recumbent with upper extremities    Time 10       PT Clinical Impression    Plan For Care Reviewed: Physical Therapy patient voices agreement with PT plan for care;PT plan for care discussed with caregiver    Impairments Found (PT Eval) ergonomics and body mechanics;gait, locomotion, and balance;joint integrity and mobility;motor " "function;muscle performance;ROM (range of motion)    Rehab Potential/Prognosis good, to achieve stated therapy goals;adequate, monitor progress closely    Problem List abnormal muscle tone;decreased flexibility;decreased ROM;decreased strength;pain    Daily Outcome Statement Patient requesting to terminate PT.  Patient continues to demonstrate weakness in L LE and core and frequent complaints of L LE \"giving way\" or \"feeling gimpy\".  To be noted patient does have decreased sensation to LT in L3-L4, L4-L5 dermatomes and decreased DTR in L patellar tendon.  Recommend patient follow through on visit with neurologist to determine course of care.  Patient agreeable.         PT Weekly Outcome Summary    Functional Goal Overall Progress --    Impairments Continuing to Limit Function --    Recommendations Discharge PT                           Goals     • LTG PT            Long Term Goals Time Frame Result Progress   Left LE MMT to 5/5 8 weeks Not MET      Patient will able to perform moderate lifting activities without pain 8 weeks D/c    Patient will be able to squat to  item from floor 8 weeks D/c    FGA  26/30 for decreased fall risk  8 weeks D/c    I in ADLs 8 weeks Met    Patient will perform stairs reciprocally by DC 8 weeks Not Met              • STG PT              Short Term Goals Time Frame Result Progress   Patient will be educated into initial HEP 4 weeks met    All STG met in pervious documentation system   See last re-evaluation                  "

## 2018-05-03 NOTE — LETTER
[address]  [place] [address]  [facility] OP Therapy Fax: [phone]    PHYSICAL THERAPY PLAN OF CARE    By signing this plan of care, I certify this plan of care as correct and necessary for the patient.        Physician Signature: _________________________________________ Date: _________________      Patient Name: Adeola Rees        Provider: Deyanira Bell, PT  Referring Provider: Bryce Allen DO  PCP: Bryce Allen DO  Payor: Payor: [employer] / Plan: [employer] POS / Product Type: Managed Care /   Medical Diagnosis: Left leg pain [M79.605]     Discharge    Clinical Impression: Patient requesting to terminate PT.  Patient continues to demonstrate weakness in L LE and core and frequent complaints of L LE “giving way” or “feeling gimpy”.  To be noted patient does have decreased sensation to LT in L3-L4, L4-L5 dermatomes and decreased DTR in L patellar tendon.  Recommend patient follow through on visit with neurologist to determine course of care.  Patient agreeable.      Patient Complaints and Deficits: decreased Strength    Plan and Interventions:   Therapeutic exercise  Therapeutic activity  Neuro-reeducation  Manual therapy    Rehab Potential: good, to achieve stated therapy goals, adequate, monitor progress closely      Patient: Adeola Rees  Location: [facility] - Physical Therapy    MRN: [id number]  Today's date: 5/3/2018          Pain/Vitals - 05/03/18 1500        Pain/Comfort/Sleep    Presence of Pain denies          Prior Living Environment            Prior Level of Function              PT Treatment Summary - 05/03/18 1200        Session Details    Document Type discharge evaluation    Mode of Treatment individual therapy;physical therapy    Patient/Family Observations Patient reporting that she wants to discharge and has an appointment scheduled with neurologist on Monday.  She reports she continues to have difficulty with transitional  "movements and reports a feeling that L LE will  \"give\".       Time Calculation    Start Time 1205    Stop Time 1300    Time Calculation (min) 55 min       Light Touch Sensation Assessment    Left Lower Extremity: Light Touch Sensation Assessment other (see comments)    Additional Details: Light Touch Sensation Assessment decreased sensation to LT in L3-L4 and L4-L5 dermatomes; reflexes - L=+1, R= 2+ DTR of patellar tendon       MMT: Left Hip    Gross Movement: Flexion (3+/5) fair plus    Gross Movement: Extension (3+/5) fair plus    Gross Movement: Hip ABduction (3+/5) fair plus       MMT: Left Knee    Gross Movement: Extension (4/5) good    Gross Movement: Flexion (4/5) good       LE Sidelying Therapeutic Exercise    Comment, Sidelying Lower Extremity s/l hip ABD with ext x20; sidelying clams x20;         LE Standing Therapeutic Exercise    Comment STS 1x10       Aerobic Exercise Activity    Exercise(s) Performed stepper, recumbent with upper extremities    Time 10       PT Clinical Impression    Plan For Care Reviewed: Physical Therapy patient voices agreement with PT plan for care;PT plan for care discussed with caregiver    Impairments Found (PT Eval) ergonomics and body mechanics;gait, locomotion, and balance;joint integrity and mobility;motor function;muscle performance;ROM (range of motion)    Rehab Potential/Prognosis good, to achieve stated therapy goals;adequate, monitor progress closely    Problem List abnormal muscle tone;decreased flexibility;decreased ROM;decreased strength;pain    Daily Outcome Statement Patient requesting to terminate PT.  Patient continues to demonstrate weakness in L LE and core and frequent complaints of L LE \"giving way\" or \"feeling gimpy\".  To be noted patient does have decreased sensation to LT in L3-L4, L4-L5 dermatomes and decreased DTR in L patellar tendon.  Recommend patient follow through on visit with neurologist to determine course of care.  Patient agreeable.         PT " Weekly Outcome Summary    Functional Goal Overall Progress --    Impairments Continuing to Limit Function --    Recommendations Discharge PT                           Goals     • LTG PT            Long Term Goals Time Frame Result Progress   Left LE MMT to 5/5 8 weeks Not MET      Patient will able to perform moderate lifting activities without pain 8 weeks D/c    Patient will be able to squat to  item from floor 8 weeks D/c    FGA  26/30 for decreased fall risk  8 weeks D/c    I in ADLs 8 weeks Met    Patient will perform stairs reciprocally by DC 8 weeks Not Met              • STG PT              Short Term Goals Time Frame Result Progress   Patient will be educated into initial HEP 4 weeks met    All STG met in pervious documentation system   See last re-evaluation                            Thank you for this referral. Please contact our department with any questions.        Thank you,   Deyanira Bell, PT

## 2018-07-08 ENCOUNTER — HOSPITAL ENCOUNTER (OUTPATIENT)
Facility: CLINIC | Age: 63
Discharge: HOME | End: 2018-07-08
Attending: INTERNAL MEDICINE
Payer: COMMERCIAL

## 2018-07-08 VITALS
WEIGHT: 175 LBS | TEMPERATURE: 98.5 F | RESPIRATION RATE: 15 BRPM | BODY MASS INDEX: 27.47 KG/M2 | HEIGHT: 67 IN | HEART RATE: 86 BPM | OXYGEN SATURATION: 98 % | DIASTOLIC BLOOD PRESSURE: 70 MMHG | SYSTOLIC BLOOD PRESSURE: 120 MMHG

## 2018-07-08 DIAGNOSIS — L03.116 CELLULITIS OF LEFT LOWER EXTREMITY: Primary | ICD-10-CM

## 2018-07-08 PROCEDURE — S9083 URGENT CARE CENTER GLOBAL: HCPCS | Performed by: INTERNAL MEDICINE

## 2018-07-08 PROCEDURE — 99213 OFFICE O/P EST LOW 20 MIN: CPT | Performed by: INTERNAL MEDICINE

## 2018-07-08 RX ORDER — CLINDAMYCIN HYDROCHLORIDE 300 MG/1
300 CAPSULE ORAL 3 TIMES DAILY
Qty: 21 CAPSULE | Refills: 0 | Status: SHIPPED | OUTPATIENT
Start: 2018-07-08 | End: 2018-07-15

## 2018-07-08 RX ORDER — GENTAMICIN SULFATE 3 MG/ML
SOLUTION/ DROPS OPHTHALMIC
Refills: 0 | COMMUNITY
Start: 2018-04-01 | End: 2020-09-15

## 2018-07-08 ASSESSMENT — ENCOUNTER SYMPTOMS
SHORTNESS OF BREATH: 0
VOMITING: 0
TROUBLE SWALLOWING: 0
NAUSEA: 0
FEVER: 0

## 2018-07-08 NOTE — ED PROVIDER NOTES
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  Started with itch    Raised lesion    Allergies; PCN, sulfa  But can take amoxil        History provided by:  Patient   used: No    Rash   Onset quality:  Sudden  Duration:  2 days  Chronicity:  New  Context: insect bite/sting    Exacerbated by: pressure/touch.  Ineffective treatments:  Antibiotic cream (hot compresses, epsom salt soaks)  Associated symptoms: no fever, no nausea, no shortness of breath and not vomiting        Past Medical History:   Diagnosis Date   • Osteoarthritis        Past Surgical History:   Procedure Laterality Date   • APPENDECTOMY     • HYSTERECTOMY     • OOPHORECTOMY     • TONSILLECTOMY         Family History   Problem Relation Age of Onset   • Hypertension Mother    • Hypertension Father    • Thyroid cancer Sister        Social History   Substance Use Topics   • Smoking status: Never Smoker   • Smokeless tobacco: Never Used   • Alcohol use No       Review of Systems   Constitutional: Negative for fever.   HENT: Negative for trouble swallowing.    Respiratory: Negative for shortness of breath.    Cardiovascular: Negative for chest pain.   Gastrointestinal: Negative for nausea and vomiting.   Skin: Positive for rash.   Allergic/Immunologic: Positive for environmental allergies.       Physical Exam  ED Triage Vitals [07/08/18 1332]   Temp Heart Rate Resp BP SpO2   36.9 °C (98.5 °F) 86 15 120/70 98 %      Temp Source Heart Rate Source Patient Position BP Location FiO2 (%) (Set)   Oral Monitor Sitting Left upper arm --       Physical Exam   Constitutional: She appears well-nourished. No distress.   Eyes: Pupils are equal, round, and reactive to light. Right eye exhibits no discharge. Left eye exhibits no discharge.   Slight inflammation Right upper lid.  No scleral injection    Cardiovascular: Normal rate and regular rhythm.    No murmur heard.  Pulmonary/Chest: Effort normal and breath sounds normal. No respiratory distress. She has no wheezes.   Musculoskeletal: She exhibits no edema.   Skin: Skin is warm and dry. She is not diaphoretic.   Over the medial aspect just below the left knee is a subcentimeter papule O pustule-like lesion minimally tender with some slight induration but no fluctuance.  There is no drainage or bleeding noted.  Surrounding it is an approximate 2 x 3 cm area of erythema.  There is no proximal streaking noted.  No clinical joint effusion of the knee adjacent to the lesion.   Psychiatric: She has a normal mood and affect. Her behavior is normal.   Vitals reviewed.        Procedures  Procedures    UC Course  ED Course          Clinical Impressions as of Jul 08 1347   Cellulitis of left lower extremity       [facility]               Jeb Perdomo, DO  07/08/18 1347       Jeb Perdomo, DO  07/08/18 134

## 2018-07-08 NOTE — DISCHARGE INSTRUCTIONS
Please continue with the warm compresses and soaks as you have been.  Please be careful to not burn herself by putting it too hot or too long on the area.  Please begin the antibiotics and take as directed.  It is 3 times a day for 1 week.  You may want to consider a probiotic in between dosages of the prescription antibiotic.  Please follow-up with your primary care physician later this week if the area is not improving especially after being on the antibiotics for a couple of days.

## 2018-07-10 ENCOUNTER — OFFICE VISIT (OUTPATIENT)
Dept: PRIMARY CARE | Facility: CLINIC | Age: 63
End: 2018-07-10
Payer: COMMERCIAL

## 2018-07-10 VITALS
OXYGEN SATURATION: 96 % | HEART RATE: 86 BPM | RESPIRATION RATE: 12 BRPM | TEMPERATURE: 98.5 F | BODY MASS INDEX: 27.82 KG/M2 | WEIGHT: 175 LBS | SYSTOLIC BLOOD PRESSURE: 126 MMHG | DIASTOLIC BLOOD PRESSURE: 80 MMHG
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
## 2018-07-10 PROCEDURE — 99213 OFFICE O/P EST LOW 20 MIN: CPT | Performed by: FAMILY MEDICINE

## 2018-07-10 ASSESSMENT — ENCOUNTER SYMPTOMS
MUSCULOSKELETAL NEGATIVE: 1
EYES NEGATIVE: 1
CONSTITUTIONAL NEGATIVE: 1
RESPIRATORY NEGATIVE: 1
CARDIOVASCULAR NEGATIVE: 1
NEUROLOGICAL NEGATIVE: 1

## 2018-07-10 NOTE — PATIENT INSTRUCTIONS
Patient Education     Insect Bite, Adult  An insect bite can make your skin red, itchy, and swollen. Some insects can spread disease to people with a bite. However, most insect bites do not lead to disease, and most are not serious.  Follow these instructions at home:  Bite area care  · Do not scratch the bite area.  · Keep the bite area clean and dry.  · Wash the bite area every day with soap and water as told by your doctor.  · Check the bite area every day for signs of infection. Check for:  ¨ More redness, swelling, or pain.  ¨ Fluid or blood.  ¨ Warmth.  ¨ Pus.  Managing pain, itching, and swelling  · You may put any of these on the bite area as told by your doctor:  ¨ A baking soda paste.  ¨ Cortisone cream.  ¨ Calamine lotion.  · If directed, put ice on the bite area.  ¨ Put ice in a plastic bag.  ¨ Place a towel between your skin and the bag.  ¨ Leave the ice on for 20 minutes, 2-3 times a day.  Medicines  · Take medicines or put medicines on your skin only as told by your doctor.  · If you were prescribed an antibiotic medicine, use it as told by your doctor. Do not stop using the antibiotic even if your condition improves.  General instructions  · Keep all follow-up visits as told by your doctor. This is important.  How is this prevented?  To help you have a lower risk of insect bites:  · When you are outside, wear clothing that covers your arms and legs.  · Use insect repellent. The best insect repellents have:  ¨ An active ingredient of DEET, picaridin, oil of lemon eucalyptus (OLE), or [id number].  ¨ Higher amounts of DEET or another active ingredient than other repellents have.  · If your home windows do not have screens, think about putting some in.  Contact a doctor if:  · You have more redness, swelling, or pain in the bite area.  · You have fluid, blood, or pus coming from the bite area.  · The bite area feels warm.  · You have a fever.  Get help right away if:  · You have joint pain.  · You have a  rash.  · You have shortness of breath.  · You feel more tired or sleepy than you normally do.  · You have neck pain.  · You have a headache.  · You feel weaker than you normally do.  · You have chest pain.  · You have pain in your belly.  · You feel sick to your stomach (nauseous) or you throw up (vomit).  Summary  · An insect bite can make your skin red, itchy, and swollen.  · Do not scratch the bite area, and keep it clean and dry.  · Ice can help with pain and itching from the bite.  This information is not intended to replace advice given to you by your health care provider. Make sure you discuss any questions you have with your health care provider.  Document Released: 12/15/2001 Document Revised: 07/20/2017 Document Reviewed: 05/04/2016  [employer] Interactive Patient Education © 2018 Elsevier Inc.

## 2018-07-10 NOTE — PROGRESS NOTES
Subjective      Patient ID: Adeolabello Rees is a 62 y.o. female.    HPI Spider bite behind left knee. Was started on clindamycin 2 days ago from [facility] and although patient has stopped the medication it looks a lot better.    The following have been reviewed and updated as appropriate in this visit:  Tobacco  Allergies  Meds  Med Hx  Surg Hx  Fam Hx  Soc Hx      Review of Systems   Constitutional: Negative.    HENT: Negative.    Eyes: Negative.    Respiratory: Negative.    Cardiovascular: Negative.    Genitourinary: Negative.    Musculoskeletal: Negative.    Neurological: Negative.      Active Ambulatory Problems     Diagnosis Date Noted   • Left leg pain 03/13/2018   • Left leg numbness 04/17/2018   • Bite, insect 07/10/2018     Resolved Ambulatory Problems     Diagnosis Date Noted   • No Resolved Ambulatory Problems     Past Medical History:   Diagnosis Date   • Osteoarthritis        Current Outpatient Prescriptions:   •  clindamycin (CLEOCIN) 300 mg capsule, Take 1 capsule (300 mg total) by mouth 3 (three) times a day for 7 days., Disp: 21 capsule, Rfl: 0  •  gentamicin (GARAMYCIN) 0.3 % ophthalmic solution, INT 1 GTT IN EACH EYE QID, Disp: , Rfl: 0  •  naproxen (NAPROSYN) 500 mg tablet, Take 500 mg by mouth as needed.  , Disp: , Rfl:   Allergies   Allergen Reactions   • Ciprofibrate    • Ciprofloxacin      Other reaction(s): Muscular pain   • Penicillin G    • Penicillins      Other reaction(s): Anaphylaxis   • Shellfish Derived Hives   • Sulfa (Sulfonamide Antibiotics)        Objective     Vitals:    07/10/18 1341   BP: 126/80   Pulse: 86   Resp: 12   Temp: 36.9 °C (98.5 °F)   SpO2: 96%   Weight: 79.4 kg (175 lb)     Body mass index is 27.82 kg/m².    Physical Exam   Constitutional: She is oriented to person, place, and time. She appears well-developed and well-nourished.   HENT:   Head: Normocephalic.   Cardiovascular: Normal rate and regular rhythm.    Pulmonary/Chest: Effort normal.   Abdominal:  Soft.   Musculoskeletal: Normal range of motion.   Neurological: She is alert and oriented to person, place, and time.   Skin: Skin is warm.   Infected spider bite 90% improved. Continue with neosporin and soaks.   Nursing note and vitals reviewed.      Assessment/Plan   Problem List Items Addressed This Visit     Bite, insect - Primary

## 2018-08-23 ENCOUNTER — RX ONLY (RX ONLY)
Age: 63
End: 2018-08-23

## 2018-08-23 ENCOUNTER — APPOINTMENT (OUTPATIENT)
Dept: URBAN - METROPOLITAN AREA CLINIC 200 | Age: 63
Setting detail: DERMATOLOGY
End: 2018-09-07

## 2018-08-23 DIAGNOSIS — L70.0 ACNE VULGARIS: ICD-10-CM

## 2018-08-23 DIAGNOSIS — L30.4 ERYTHEMA INTERTRIGO: ICD-10-CM

## 2018-08-23 DIAGNOSIS — L57.8 OTHER SKIN CHANGES DUE TO CHRONIC EXPOSURE TO NONIONIZING RADIATION: ICD-10-CM

## 2018-08-23 PROBLEM — D23.5 OTHER BENIGN NEOPLASM OF SKIN OF TRUNK: Status: ACTIVE | Noted: 2018-08-23

## 2018-08-23 PROCEDURE — OTHER COUNSELING: OTHER

## 2018-08-23 PROCEDURE — 99213 OFFICE O/P EST LOW 20 MIN: CPT

## 2018-08-23 PROCEDURE — OTHER PRESCRIPTION: OTHER

## 2018-08-23 PROCEDURE — OTHER REASSURANCE: OTHER

## 2018-08-23 PROCEDURE — OTHER RECOMMENDATIONS: OTHER

## 2018-08-23 RX ORDER — SODIUM SULFACETAMIDE AND SULFUR 80; 40 MG/ML; MG/ML
LOTION TOPICAL
Qty: 1 | Refills: 6 | Status: ERX | COMMUNITY
Start: 2018-08-23

## 2018-08-23 RX ORDER — TAZAROTENE 1 MG/G
CREAM CUTANEOUS
Qty: 1 | Refills: 6 | Status: ERX

## 2018-08-23 RX ORDER — TAZAROTENE 1 MG/G
CREAM CUTANEOUS
Qty: 1 | Refills: 6 | Status: ERX | COMMUNITY
Start: 2018-08-23

## 2018-08-23 ASSESSMENT — LOCATION DETAILED DESCRIPTION DERM
LOCATION DETAILED: RIGHT MEDIAL BREAST 5-6:00 REGION
LOCATION DETAILED: LEFT INFERIOR CENTRAL MALAR CHEEK
LOCATION DETAILED: LEFT MEDIAL SUPERIOR CHEST

## 2018-08-23 ASSESSMENT — PAIN INTENSITY VAS: HOW INTENSE IS YOUR PAIN 0 BEING NO PAIN, 10 BEING THE MOST SEVERE PAIN POSSIBLE?: NO PAIN

## 2018-08-23 ASSESSMENT — LOCATION SIMPLE DESCRIPTION DERM
LOCATION SIMPLE: CHEST
LOCATION SIMPLE: RIGHT BREAST
LOCATION SIMPLE: LEFT CHEEK

## 2018-08-23 ASSESSMENT — SEVERITY ASSESSMENT OVERALL AMONG ALL PATIENTS
IN YOUR EXPERIENCE, AMONG ALL PATIENTS YOU HAVE SEEN WITH THIS CONDITION, HOW SEVERE IS THIS PATIENT'S CONDITION?: MULTIPLE INFLAMMATORY LESIONS BUT NONINFLAMMATORY LESIONS PREDOMINATE

## 2018-08-23 ASSESSMENT — LOCATION ZONE DERM
LOCATION ZONE: FACE
LOCATION ZONE: TRUNK

## 2018-08-23 ASSESSMENT — SEVERITY ASSESSMENT: SEVERITY: MILD TO MODERATE

## 2018-08-23 NOTE — PROCEDURE: RECOMMENDATIONS
Recommendation Preamble: The following recommendations were made during the visit:
Detail Level: Simple
Recommendations (Free Text): Cont nystatin tac

## 2019-01-28 ENCOUNTER — DOCUMENTATION (OUTPATIENT)
Dept: HEMATOLOGY/ONCOLOGY | Facility: HOSPITAL | Age: 64
End: 2019-01-28

## 2019-02-14 ENCOUNTER — HOSPITAL ENCOUNTER (OUTPATIENT)
Dept: RADIOLOGY | Age: 64
Discharge: HOME | End: 2019-02-14
Attending: FAMILY MEDICINE
Payer: COMMERCIAL

## 2019-02-14 ENCOUNTER — TELEPHONE (OUTPATIENT)
Dept: PRIMARY CARE | Facility: CLINIC | Age: 64
End: 2019-02-14

## 2019-02-14 ENCOUNTER — OFFICE VISIT (OUTPATIENT)
Dept: PRIMARY CARE | Facility: CLINIC | Age: 64
End: 2019-02-14
Payer: COMMERCIAL

## 2019-02-14 VITALS
HEART RATE: 87 BPM | BODY MASS INDEX: 29.67 KG/M2 | RESPIRATION RATE: 12 BRPM | OXYGEN SATURATION: 98 % | DIASTOLIC BLOOD PRESSURE: 76 MMHG | WEIGHT: 186.6 LBS | SYSTOLIC BLOOD PRESSURE: 124 MMHG

## 2019-02-14 DIAGNOSIS — K59.09 CHRONIC CONSTIPATION: Primary | ICD-10-CM

## 2019-02-14 DIAGNOSIS — R14.0 ABDOMINAL BLOATING: ICD-10-CM

## 2019-02-14 DIAGNOSIS — K56.41 FECAL IMPACTION (CMS/HCC): ICD-10-CM

## 2019-02-14 DIAGNOSIS — K59.09 CHRONIC CONSTIPATION: ICD-10-CM

## 2019-02-14 PROCEDURE — 99214 OFFICE O/P EST MOD 30 MIN: CPT | Performed by: FAMILY MEDICINE

## 2019-02-14 PROCEDURE — 74018 RADEX ABDOMEN 1 VIEW: CPT

## 2019-02-14 RX ORDER — TAZAROTENE 1 MG/G
CREAM TOPICAL
COMMUNITY
Start: 2019-01-29 | End: 2020-09-15

## 2019-02-14 ASSESSMENT — ENCOUNTER SYMPTOMS
CONSTITUTIONAL NEGATIVE: 1
RESPIRATORY NEGATIVE: 1
EYES NEGATIVE: 1
CARDIOVASCULAR NEGATIVE: 1
NEUROLOGICAL NEGATIVE: 1
MUSCULOSKELETAL NEGATIVE: 1
HEMATOLOGIC/LYMPHATIC NEGATIVE: 1
GASTROINTESTINAL NEGATIVE: 1

## 2019-02-14 NOTE — PATIENT INSTRUCTIONS
Patient Education     Constipation, Adult  Constipation is when a person has fewer bowel movements in a week than normal, has difficulty having a bowel movement, or has stools that are dry, hard, or larger than normal. Constipation may be caused by an underlying condition. It may become worse with age if a person takes certain medicines and does not take in enough fluids.  Follow these instructions at home:  Eating and drinking    · Eat foods that have a lot of fiber, such as fresh fruits and vegetables, whole grains, and beans.  · Limit foods that are high in fat, low in fiber, or overly processed, such as french fries, hamburgers, cookies, candies, and soda.  · Drink enough fluid to keep your urine clear or pale yellow.  General instructions  · Exercise regularly or as told by your health care provider.  · Go to the restroom when you have the urge to go. Do not hold it in.  · Take over-the-counter and prescription medicines only as told by your health care provider. These include any fiber supplements.  · Practice pelvic floor retraining exercises, such as deep breathing while relaxing the lower abdomen and pelvic floor relaxation during bowel movements.  · Watch your condition for any changes.  · Keep all follow-up visits as told by your health care provider. This is important.  Contact a health care provider if:  · You have pain that gets worse.  · You have a fever.  · You do not have a bowel movement after 4 days.  · You vomit.  · You are not hungry.  · You lose weight.  · You are bleeding from the anus.  · You have thin, pencil-like stools.  Get help right away if:  · You have a fever and your symptoms suddenly get worse.  · You leak stool or have blood in your stool.  · Your abdomen is bloated.  · You have severe pain in your abdomen.  · You feel dizzy or you faint.  This information is not intended to replace advice given to you by your health care provider. Make sure you discuss any questions you have  with your health care provider.  Document Released: 09/15/2005 Document Revised: 07/07/2017 Document Reviewed: 06/07/2017  Elsevier Interactive Patient Education © 2017 Elsevier Inc.

## 2019-02-14 NOTE — TELEPHONE ENCOUNTER
Precert obtained through [employer] for CT lung cancer screening for [facility]. Script faxed to [place] at [phone]

## 2019-02-14 NOTE — PROGRESS NOTES
Subjective      Patient ID: Adeola Rees is a 63 y.o. female.  1955      HPI patient states that she is always impacted and constipated and is having problems eating. Has to use laxatives.   Started a couple of weeks ago.   Last colonoscopy 5 years ago.       The following have been reviewed and updated as appropriate in this visit:  Tobacco  Allergies  Meds  Med Hx  Surg Hx  Fam Hx  Soc Hx      Review of Systems   Constitutional: Negative.    HENT: Negative.    Eyes: Negative.    Respiratory: Negative.    Cardiovascular: Negative.    Gastrointestinal: Negative.    Genitourinary: Negative.    Musculoskeletal: Negative.    Neurological: Negative.    Hematological: Negative.      Active Ambulatory Problems     Diagnosis Date Noted   • Left leg pain 03/13/2018   • Left leg numbness 04/17/2018   • Bite, insect 07/10/2018   • Chronic constipation 02/14/2019   • Abdominal bloating 02/14/2019   • Fecal impaction (CMS/HCC) ([facility]) 02/14/2019     Resolved Ambulatory Problems     Diagnosis Date Noted   • No Resolved Ambulatory Problems     Past Medical History:   Diagnosis Date   • Osteoarthritis        Current Outpatient Prescriptions:   •  gentamicin (GARAMYCIN) 0.3 % ophthalmic solution, INT 1 GTT IN EACH EYE QID, Disp: , Rfl: 0  •  naproxen (NAPROSYN) 500 mg tablet, Take 500 mg by mouth as needed.  , Disp: , Rfl:   •  tazarotene (AVAGE) 0.1 % cream, , Disp: , Rfl:   Allergies   Allergen Reactions   • Ciprofibrate    • Ciprofloxacin      Other reaction(s): Muscular pain   • Penicillin G    • Penicillins      Other reaction(s): Anaphylaxis   • Shellfish Derived Hives   • Sulfa (Sulfonamide Antibiotics)          Social History     Social History   • Marital status:      Spouse name: N/A   • Number of children: N/A   • Years of education: N/A     Social History Main Topics   • Smoking status: Never Smoker   • Smokeless tobacco: Never Used   • Alcohol use No   • Drug use: Unknown   • Sexual activity: Not  Asked     Other Topics Concern   • None     Social History Narrative   • None         Family History   Problem Relation Age of Onset   • Hypertension Mother    • Hypertension Father    • Thyroid cancer Sister        Past Surgical History:   Procedure Laterality Date   • APPENDECTOMY     • HYSTERECTOMY     • OOPHORECTOMY     • TONSILLECTOMY         Objective     Vitals:    02/14/19 1319   BP: 124/76   BP Location: Left upper arm   Pulse: 87   Resp: 12   SpO2: 98%   Weight: 84.6 kg (186 lb 9.6 oz)     Body mass index is 29.67 kg/m².    Physical Exam   Constitutional: She is oriented to person, place, and time. She appears well-developed and well-nourished.   HENT:   Head: Normocephalic and atraumatic.   Eyes: EOM are normal. Pupils are equal, round, and reactive to light.   Cardiovascular: Normal rate and regular rhythm.    Pulmonary/Chest: Effort normal.   Abdominal: Soft.   Musculoskeletal: Normal range of motion.   Neurological: She is alert and oriented to person, place, and time.   Skin: Skin is warm and dry.   Nursing note and vitals reviewed.      Assessment/Plan   Diagnoses and all orders for this visit:    Chronic constipation (Primary)  -     X-RAY ABDOMEN 1 VIEW; Future  -     Ambulatory referral to Gastroenterology; Future    Abdominal bloating  -     X-RAY ABDOMEN 1 VIEW; Future  -     ULTRASOUND ABDOMEN COMPLETE; Future  -     Ambulatory referral to Gastroenterology; Future    Fecal impaction (CMS/HCC) (HCC)  -     X-RAY ABDOMEN 1 VIEW; Future  -     Ambulatory referral to Gastroenterology; Future

## 2019-02-20 ENCOUNTER — HOSPITAL ENCOUNTER (OUTPATIENT)
Dept: RADIOLOGY | Age: 64
Discharge: HOME | End: 2019-02-20
Attending: FAMILY MEDICINE
Payer: COMMERCIAL

## 2019-02-20 DIAGNOSIS — R14.0 ABDOMINAL BLOATING: ICD-10-CM

## 2019-02-20 PROCEDURE — 76700 US EXAM ABDOM COMPLETE: CPT

## 2019-02-21 ENCOUNTER — TELEPHONE (OUTPATIENT)
Dept: HEMATOLOGY/ONCOLOGY | Facility: HOSPITAL | Age: 64
End: 2019-02-21

## 2019-02-21 ENCOUNTER — TRANSCRIBE ORDERS (OUTPATIENT)
Dept: SCHEDULING | Age: 64
End: 2019-02-21

## 2019-02-21 VITALS — WEIGHT: 186 LBS | HEIGHT: 66 IN | BODY MASS INDEX: 29.89 KG/M2

## 2019-02-21 DIAGNOSIS — Z72.0 TOBACCO USE: Primary | ICD-10-CM

## 2019-02-21 NOTE — TELEPHONE ENCOUNTER
Outreach for Screening  Sister: Thyroid  Mother: Basal cell Carcinoma  Aunt: Maternal side Breast CA  Paternal Grandmother: Breast CA  Pt. Reports cutting back on cigarettes in the last 10yrs.    Used to refinish furniture.  1 mo.   Tuesday 10AM, Dr. Rene Soriano  [place]. New Patient Paperwork.    Pt. Is due to follow-up w/ Dr. Thompson for evaluation of back.    Confirmed appt. At [facility] for Lung Screening on 03/08/19 at 1000 (0930) arrival. Mailed script to pt. Faxed to [place] at [facility].

## 2019-02-28 DIAGNOSIS — R19.7 DIARRHEA, UNSPECIFIED TYPE: Primary | ICD-10-CM

## 2019-03-07 ENCOUNTER — TELEPHONE (OUTPATIENT)
Dept: PRIMARY CARE | Facility: CLINIC | Age: 64
End: 2019-03-07

## 2019-03-07 NOTE — TELEPHONE ENCOUNTER
Pt is requesting the results of her labs, ordered by Dr. López's office, labs are scanned into pt chart.

## 2019-03-08 ENCOUNTER — HOSPITAL ENCOUNTER (OUTPATIENT)
Dept: RADIOLOGY | Age: 64
Discharge: HOME | End: 2019-03-08
Attending: FAMILY MEDICINE
Payer: COMMERCIAL

## 2019-03-08 DIAGNOSIS — Z72.0 TOBACCO USE: ICD-10-CM

## 2019-03-08 PROCEDURE — G0297 LDCT FOR LUNG CA SCREEN: HCPCS

## 2019-03-11 LAB
CAMPYLOBACTER STL CULT: NORMAL
E COLI SXT STL QL IA: NORMAL
SALM + SHIG STL CULT: NORMAL

## 2019-04-09 ENCOUNTER — OFFICE VISIT (OUTPATIENT)
Dept: PRIMARY CARE | Facility: CLINIC | Age: 64
End: 2019-04-09
Payer: COMMERCIAL

## 2019-04-09 VITALS
DIASTOLIC BLOOD PRESSURE: 82 MMHG | HEART RATE: 105 BPM | WEIGHT: 186 LBS | SYSTOLIC BLOOD PRESSURE: 114 MMHG | BODY MASS INDEX: 30.02 KG/M2 | RESPIRATION RATE: 12 BRPM | OXYGEN SATURATION: 95 %

## 2019-04-09 DIAGNOSIS — Z86.59 MENTAL STATUS CHANGE RESOLVED: Primary | ICD-10-CM

## 2019-04-09 DIAGNOSIS — R51.9 HEADACHE, CHRONIC DAILY: ICD-10-CM

## 2019-04-09 DIAGNOSIS — R41.3 MEMORY LOSS: ICD-10-CM

## 2019-04-09 PROCEDURE — 99214 OFFICE O/P EST MOD 30 MIN: CPT | Performed by: FAMILY MEDICINE

## 2019-04-09 ASSESSMENT — ENCOUNTER SYMPTOMS
MUSCULOSKELETAL NEGATIVE: 1
CONSTITUTIONAL NEGATIVE: 1
LIGHT-HEADEDNESS: 1
HEADACHES: 1
RESPIRATORY NEGATIVE: 1
HEMATOLOGIC/LYMPHATIC NEGATIVE: 1
GASTROINTESTINAL NEGATIVE: 1
CARDIOVASCULAR NEGATIVE: 1
EYES NEGATIVE: 1

## 2019-04-09 NOTE — PATIENT INSTRUCTIONS
Patient Education     General Headache Without Cause  A headache is pain or discomfort felt around the head or neck area. There are many causes and types of headaches. In some cases, the cause may not be found.  Follow these instructions at home:  Managing pain  · Take over-the-counter and prescription medicines only as told by your doctor.  · Lie down in a dark, quiet room when you have a headache.  · If directed, apply ice to the head and neck area:  ¨ Put ice in a plastic bag.  ¨ Place a towel between your skin and the bag.  ¨ Leave the ice on for 20 minutes, 2-3 times per day.  · Use a heating pad or hot shower to apply heat to the head and neck area as told by your doctor.  · Keep lights dim if bright lights bother you or make your headaches worse.  Eating and drinking  · Eat meals on a regular schedule.  · Lessen how much alcohol you drink.  · Lessen how much caffeine you drink, or stop drinking caffeine.  General instructions  · Keep all follow-up visits as told by your doctor. This is important.  · Keep a journal to find out if certain things bring on headaches. For example, write down:  ¨ What you eat and drink.  ¨ How much sleep you get.  ¨ Any change to your diet or medicines.  · Relax by getting a massage or doing other relaxing activities.  · Lessen stress.  · Sit up straight. Do not tighten (tense) your muscles.  · Do not use tobacco products. This includes cigarettes, chewing tobacco, or e-cigarettes. If you need help quitting, ask your doctor.  · Exercise regularly as told by your doctor.  · Get enough sleep. This often means 7-9 hours of sleep.  Contact a doctor if:  · Your symptoms are not helped by medicine.  · You have a headache that feels different than the other headaches.  · You feel sick to your stomach (nauseous) or you throw up (vomit).  · You have a fever.  Get help right away if:  · Your headache becomes really bad.  · You keep throwing up.  · You have a stiff neck.  · You have trouble  seeing.  · You have trouble speaking.  · You have pain in the eye or ear.  · Your muscles are weak or you lose muscle control.  · You lose your balance or have trouble walking.  · You feel like you will pass out (faint) or you pass out.  · You have confusion.  This information is not intended to replace advice given to you by your health care provider. Make sure you discuss any questions you have with your health care provider.  Document Released: 09/26/2009 Document Revised: 05/25/2017 Document Reviewed: 04/11/2016  [employer] Interactive Patient Education © 2018 [employer]vier Inc.

## 2019-04-19 ENCOUNTER — HOSPITAL ENCOUNTER (OUTPATIENT)
Dept: RADIOLOGY | Facility: HOSPITAL | Age: 64
Discharge: HOME | End: 2019-04-19
Attending: FAMILY MEDICINE
Payer: COMMERCIAL

## 2019-04-19 DIAGNOSIS — R51.9 HEADACHE, CHRONIC DAILY: ICD-10-CM

## 2019-04-19 DIAGNOSIS — R41.3 MEMORY LOSS: ICD-10-CM

## 2019-04-19 DIAGNOSIS — Z86.59 MENTAL STATUS CHANGE RESOLVED: ICD-10-CM

## 2019-04-19 PROCEDURE — 70551 MRI BRAIN STEM W/O DYE: CPT

## 2019-06-17 ENCOUNTER — ANESTHESIA EVENT (OUTPATIENT)
Dept: ENDOSCOPY | Facility: HOSPITAL | Age: 64
End: 2019-06-17
Payer: COMMERCIAL

## 2019-06-17 ENCOUNTER — ANESTHESIA (OUTPATIENT)
Dept: ENDOSCOPY | Facility: HOSPITAL | Age: 64
End: 2019-06-17
Payer: COMMERCIAL

## 2019-06-17 ENCOUNTER — HOSPITAL ENCOUNTER (OUTPATIENT)
Facility: HOSPITAL | Age: 64
Discharge: HOME | End: 2019-06-17
Attending: INTERNAL MEDICINE | Admitting: INTERNAL MEDICINE
Payer: COMMERCIAL

## 2019-06-17 VITALS
OXYGEN SATURATION: 100 % | SYSTOLIC BLOOD PRESSURE: 127 MMHG | DIASTOLIC BLOOD PRESSURE: 74 MMHG | BODY MASS INDEX: 28.28 KG/M2 | HEIGHT: 66 IN | HEART RATE: 57 BPM | TEMPERATURE: 97 F | RESPIRATION RATE: 17 BRPM | WEIGHT: 176 LBS

## 2019-06-17 DIAGNOSIS — K21.9 CHRONIC GERD: ICD-10-CM

## 2019-06-17 DIAGNOSIS — R19.8 CHANGE IN BOWEL FUNCTION: ICD-10-CM

## 2019-06-17 PROCEDURE — 0DBK8ZX EXCISION OF ASCENDING COLON, VIA NATURAL OR ARTIFICIAL OPENING ENDOSCOPIC, DIAGNOSTIC: ICD-10-PCS | Performed by: INTERNAL MEDICINE

## 2019-06-17 PROCEDURE — 0DB18ZX EXCISION OF UPPER ESOPHAGUS, VIA NATURAL OR ARTIFICIAL OPENING ENDOSCOPIC, DIAGNOSTIC: ICD-10-PCS | Performed by: INTERNAL MEDICINE

## 2019-06-17 PROCEDURE — 75000081 HC EGD W WO BRUSH WASH: Performed by: INTERNAL MEDICINE

## 2019-06-17 PROCEDURE — 37000002 HC ANESTHESIA MAC: Performed by: INTERNAL MEDICINE

## 2019-06-17 PROCEDURE — 63600000 HC DRUGS/DETAIL CODE: Performed by: NURSE ANESTHETIST, CERTIFIED REGISTERED

## 2019-06-17 PROCEDURE — 0DBB8ZX EXCISION OF ILEUM, VIA NATURAL OR ARTIFICIAL OPENING ENDOSCOPIC, DIAGNOSTIC: ICD-10-PCS | Performed by: INTERNAL MEDICINE

## 2019-06-17 PROCEDURE — 0DB78ZX EXCISION OF STOMACH, PYLORUS, VIA NATURAL OR ARTIFICIAL OPENING ENDOSCOPIC, DIAGNOSTIC: ICD-10-PCS | Performed by: INTERNAL MEDICINE

## 2019-06-17 PROCEDURE — 63600000 HC DRUGS/DETAIL CODE: Performed by: INTERNAL MEDICINE

## 2019-06-17 PROCEDURE — 0DB38ZX EXCISION OF LOWER ESOPHAGUS, VIA NATURAL OR ARTIFICIAL OPENING ENDOSCOPIC, DIAGNOSTIC: ICD-10-PCS | Performed by: INTERNAL MEDICINE

## 2019-06-17 PROCEDURE — 88305 TISSUE EXAM BY PATHOLOGIST: CPT | Performed by: INTERNAL MEDICINE

## 2019-06-17 PROCEDURE — 25000000 HC PHARMACY GENERAL: Performed by: NURSE ANESTHETIST, CERTIFIED REGISTERED

## 2019-06-17 PROCEDURE — 0DB98ZX EXCISION OF DUODENUM, VIA NATURAL OR ARTIFICIAL OPENING ENDOSCOPIC, DIAGNOSTIC: ICD-10-PCS | Performed by: INTERNAL MEDICINE

## 2019-06-17 PROCEDURE — 0DB48ZX EXCISION OF ESOPHAGOGASTRIC JUNCTION, VIA NATURAL OR ARTIFICIAL OPENING ENDOSCOPIC, DIAGNOSTIC: ICD-10-PCS | Performed by: INTERNAL MEDICINE

## 2019-06-17 PROCEDURE — 75000014 HC COLONSCOPY DIAGNOSTIC: Performed by: INTERNAL MEDICINE

## 2019-06-17 PROCEDURE — 0DBP8ZX EXCISION OF RECTUM, VIA NATURAL OR ARTIFICIAL OPENING ENDOSCOPIC, DIAGNOSTIC: ICD-10-PCS | Performed by: INTERNAL MEDICINE

## 2019-06-17 RX ORDER — ONDANSETRON HYDROCHLORIDE 2 MG/ML
4 INJECTION, SOLUTION INTRAVENOUS
Status: DISCONTINUED | OUTPATIENT
Start: 2019-06-17 | End: 2019-06-17 | Stop reason: HOSPADM

## 2019-06-17 RX ORDER — SODIUM CHLORIDE, SODIUM LACTATE, POTASSIUM CHLORIDE, CALCIUM CHLORIDE 600; 310; 30; 20 MG/100ML; MG/100ML; MG/100ML; MG/100ML
INJECTION, SOLUTION INTRAVENOUS CONTINUOUS
Status: DISCONTINUED | OUTPATIENT
Start: 2019-06-17 | End: 2019-06-17 | Stop reason: HOSPADM

## 2019-06-17 RX ORDER — DIPHENHYDRAMINE HCL 50 MG/ML
12.5 VIAL (ML) INJECTION
Status: DISCONTINUED | OUTPATIENT
Start: 2019-06-17 | End: 2019-06-17 | Stop reason: HOSPADM

## 2019-06-17 RX ORDER — PROPOFOL 200MG/20ML
SYRINGE (ML) INTRAVENOUS AS NEEDED
Status: DISCONTINUED | OUTPATIENT
Start: 2019-06-17 | End: 2019-06-17 | Stop reason: SURG

## 2019-06-17 RX ORDER — ACETAMINOPHEN 325 MG/1
650 TABLET ORAL ONCE AS NEEDED
Status: DISCONTINUED | OUTPATIENT
Start: 2019-06-17 | End: 2019-06-17 | Stop reason: HOSPADM

## 2019-06-17 RX ORDER — IBUPROFEN 200 MG
16-32 TABLET ORAL AS NEEDED
Status: DISCONTINUED | OUTPATIENT
Start: 2019-06-17 | End: 2019-06-17 | Stop reason: HOSPADM

## 2019-06-17 RX ORDER — DEXTROSE 50 % IN WATER (D50W) INTRAVENOUS SYRINGE
25 AS NEEDED
Status: CANCELLED | OUTPATIENT
Start: 2019-06-17 | End: 2019-09-15

## 2019-06-17 RX ORDER — DEXTROSE 40 %
15-30 GEL (GRAM) ORAL AS NEEDED
Status: CANCELLED | OUTPATIENT
Start: 2019-06-17 | End: 2019-09-15

## 2019-06-17 RX ORDER — DEXTROSE 40 %
15-30 GEL (GRAM) ORAL AS NEEDED
Status: DISCONTINUED | OUTPATIENT
Start: 2019-06-17 | End: 2019-06-17 | Stop reason: HOSPADM

## 2019-06-17 RX ORDER — MIDAZOLAM HYDROCHLORIDE 2 MG/2ML
INJECTION, SOLUTION INTRAMUSCULAR; INTRAVENOUS AS NEEDED
Status: DISCONTINUED | OUTPATIENT
Start: 2019-06-17 | End: 2019-06-17 | Stop reason: SURG

## 2019-06-17 RX ORDER — LIDOCAINE HYDROCHLORIDE 10 MG/ML
INJECTION, SOLUTION EPIDURAL; INFILTRATION; INTRACAUDAL; PERINEURAL AS NEEDED
Status: DISCONTINUED | OUTPATIENT
Start: 2019-06-17 | End: 2019-06-17 | Stop reason: SURG

## 2019-06-17 RX ORDER — IBUPROFEN 200 MG
16-32 TABLET ORAL AS NEEDED
Status: CANCELLED | OUTPATIENT
Start: 2019-06-17 | End: 2019-09-15

## 2019-06-17 RX ORDER — ASCORBIC ACID 1000 MG
460 TABLET ORAL
COMMUNITY
End: 2020-09-15

## 2019-06-17 RX ORDER — DEXTROSE 50 % IN WATER (D50W) INTRAVENOUS SYRINGE
25 AS NEEDED
Status: DISCONTINUED | OUTPATIENT
Start: 2019-06-17 | End: 2019-06-17 | Stop reason: HOSPADM

## 2019-06-17 RX ADMIN — LIDOCAINE HYDROCHLORIDE 3 ML: 10 INJECTION, SOLUTION EPIDURAL; INFILTRATION; INTRACAUDAL; PERINEURAL at 09:45

## 2019-06-17 RX ADMIN — SODIUM CHLORIDE, SODIUM LACTATE, POTASSIUM CHLORIDE, CALCIUM CHLORIDE: 600; 310; 30; 20 INJECTION, SOLUTION INTRAVENOUS at 09:29

## 2019-06-17 RX ADMIN — PROPOFOL 50 MG: 10 INJECTION, EMULSION INTRAVENOUS at 10:25

## 2019-06-17 RX ADMIN — PROPOFOL 50 MG: 10 INJECTION, EMULSION INTRAVENOUS at 10:15

## 2019-06-17 RX ADMIN — PROPOFOL 50 MG: 10 INJECTION, EMULSION INTRAVENOUS at 10:05

## 2019-06-17 RX ADMIN — PROPOFOL 50 MG: 10 INJECTION, EMULSION INTRAVENOUS at 09:50

## 2019-06-17 RX ADMIN — PROPOFOL 50 MG: 10 INJECTION, EMULSION INTRAVENOUS at 09:55

## 2019-06-17 RX ADMIN — PROPOFOL 50 MG: 10 INJECTION, EMULSION INTRAVENOUS at 10:00

## 2019-06-17 RX ADMIN — PROPOFOL 50 MG: 10 INJECTION, EMULSION INTRAVENOUS at 09:39

## 2019-06-17 RX ADMIN — MIDAZOLAM HYDROCHLORIDE 2 MG: 1 INJECTION, SOLUTION INTRAMUSCULAR; INTRAVENOUS at 09:38

## 2019-06-17 RX ADMIN — SODIUM CHLORIDE, SODIUM LACTATE, POTASSIUM CHLORIDE, CALCIUM CHLORIDE: 600; 310; 30; 20 INJECTION, SOLUTION INTRAVENOUS at 09:45

## 2019-06-17 ASSESSMENT — ENCOUNTER SYMPTOMS: HEADACHES: 1

## 2019-06-17 NOTE — ANESTHESIA POSTPROCEDURE EVALUATION
Patient: Adeola Rees    Procedure Summary     Date:  06/17/19 Room / Location:   OR 12 / [facility]    Anesthesia Start:  0936 Anesthesia Stop:  1038    Procedures:       Colonoscopy (N/A )      EGD (N/A Esophagus) Diagnosis:       Change in bowel function      Chronic GERD      (Change in bowel, GERD)    Provider:  Mamadou López MD Responsible Provider:  Subhash Blanton MD    Anesthesia Type:  MAC ASA Status:  2          Anesthesia Type: MAC  PACU Vitals  6/17/2019 1033 - 6/17/2019 1133      6/17/2019 1042 6/17/2019 1045 6/17/2019 1100 6/17/2019 1115    BP: (!)  86/54 104/65 127/74 -    Temp: 36.1 °C (97 °F) - - -    Pulse: 68 65 62 (!)  57    Resp: 13 14 (!)  8 17    SpO2: 100 % 99 % 100 % 100 %            Anesthesia Post Evaluation    Pain management: adequate  Patient participation: complete - patient participated  Level of consciousness: awake and alert  Cardiovascular status: acceptable  Airway Patency: adequate  Respiratory status: acceptable  Hydration status: acceptable  Anesthetic complications: no

## 2019-06-17 NOTE — OP NOTE
_______________________________________________________________________________  Patient Name: Adeola Rees          Procedure Date: 6/17/2019 9:31 AM  MRN: [id number]                     Account Number: [id number]  YOB: 1955             Age: 63  Gender: Female                        Note Status: Finalized  Attending MD: MIKAL DAY MD~SHAY  _______________________________________________________________________________  Procedure:            Upper GI endoscopy  Indications:          Dysphagia, Suspected gastro-esophageal reflux disease  Providers:            MIKAL DAY MD~SHAY (Doctor), Leandra Guerrero  (Nurse), Sima Duke (Nurse)  Referring MD:         YIFAN MATHEWS DO~REID  Requesting Provider:  Medicines:            See the Anesthesia note for documentation of the  administered medications  Complications:        No immediate complications. Estimated blood loss: None.  _______________________________________________________________________________  Procedure:            After obtaining informed consent, the endoscope was  passed under direct vision. Throughout the procedure,  the patient's blood pressure, pulse, and oxygen  saturations were monitored continuously. The was  introduced through the mouth, and advanced to the  second part of duodenum. The upper GI endoscopy was  accomplished without difficulty. The patient tolerated  the procedure well.  Estimated Blood Loss: Estimated blood loss: none.  Findings:  The upper third of the esophagus and lower third of the esophagus were  normal. Biopsies were taken with a cold forceps for histology. r/o:  eosinophilic esophagitis  The middle third of the esophagus was normal.  The Z-line was irregular and was found 37 cm from the incisors.  Localized moderately erythematous mucosa was found at the  gastroesophageal junction. Biopsies were taken with a cold forceps for  histology.  Mild inflammation characterized by erythema  was found in the gastric  body and in the gastric antrum. Biopsies were taken with a cold forceps  for Helicobacter pylori testing.  The cardia and gastric fundus were normal on retroflexion.  The duodenal bulb was normal.  The 2nd part of the duodenum was normal. Biopsies for histology were  taken with a cold forceps for evaluation of celiac disease.  Impression:           - Normal upper third of esophagus and lower third of  esophagus. Biopsied.  - Normal middle third of esophagus.  - Z-line irregular, 37 cm from the incisors.  - Erythematous mucosa in the gastroesophageal junction.  Biopsied.  - Gastritis. Biopsied.  - Normal duodenal bulb.  - Normal 2nd part of the duodenum. Biopsied.  Recommendation:       - Follow an antireflux regimen.  - Use Protonix (pantoprazole) 40 mg PO daily.  - Return to my office in 3 months.  - Repeat the upper endoscopy 6-12 months for  surveillance based on pathology results.  - Perform a colonoscopy today.  Procedure Code(s):    --- Professional ---  40289, Esophagogastroduodenoscopy, flexible, transoral;  with biopsy, single or multiple  Diagnosis Code(s):    --- Professional ---  K22.8, Other specified diseases of esophagus  K31.89, Other diseases of stomach and duodenum  K29.70, Gastritis, unspecified, without bleeding  R13.10, Dysphagia, unspecified  CPT copyright 2015 [employer] Association. All rights reserved.  The codes documented in this report are preliminary and upon  review may  be revised to meet current compliance requirements.  Attending Participation:  I personally performed the entire procedure.  __________________________  MIKAL DAY MD~SHAY  6/17/2019 10:03:40 AM  This report has been signed electronically.  Number of Addenda: 0  Note Initiated On: 6/17/2019 9:31 AM

## 2019-06-17 NOTE — PERIOPERATIVE NURSING NOTE
Patient states she has LLE pain, numbness and weakness from an accident. Fall risk bracelet on. Walking with assist to bathroom.

## 2019-06-17 NOTE — ANESTHESIA PREPROCEDURE EVALUATION
Anesthesia ROS/MED HX    Anesthesia History - neg  Pulmonary - neg  Neuro/Psych    Headaches   Anxiety  Cardiovascular- neg  GI/Hepatic- neg  Musculoskeletal- neg  Endo/Other- neg      Past Surgical History:   Procedure Laterality Date   • APPENDECTOMY     • HYSTERECTOMY     • OOPHORECTOMY     • TONSILLECTOMY         Physical Exam    Airway   Mallampati: II   TM distance: >3 FB   Neck ROM: full  Cardiovascular - normal   Rhythm: regular   Rate: normal  Pulmonary - normal   clear to auscultation  Dental - normal        Anesthesia Plan    Plan: MAC    Technique: MAC   ASA 2  Anesthetic plan and risks discussed with: patient

## 2019-06-17 NOTE — DISCHARGE INSTRUCTIONS
1. Resume previous diet or diet recommended by your doctor.  2.Do not drive, operate machinery, make critical decisions, or do activities that require coordination or balance for 24 hrs.  3. Because air was put into your colon during the procedure, expelling large amounts or air from your rectum is normal.  4.You may not have a bowel movement for 1-3 days because of the colonoscopy pre.  This is normal.  5.Go directly to the emergency room if you notice any of the following:    Chills and/or fever over 101  Persistent vomiting  Severe abdominal pain, other than gas cramps.  Severe chest pain  Black, tarry stools  Any bleeding - exceeding one tablespoon.1. Resume previous diet or diet recommended by your doctor.  2.Do not drive, operate machinery, make critical decisions, or do activities that require coordination or balance for 24 hrs.  3.You may experience a sore throat for 24 to 48 hrs. You may use throat lozenges or gargle with warm salt water to relieve the disconfort.    3. Because air was put into your stomach during the procedure,  You may experience some belching.  4.Go directly to the emergency room if you notice any of the following:    Chills and/or fever over 101  Persistent vomiting or vomiting with blood  Severe abdominal pain, other than gas cramps.  Severe chest pain  Black, tarry stools

## 2019-06-17 NOTE — OP NOTE
_______________________________________________________________________________  Patient Name: Adeola Rees          Procedure Date: 6/17/2019 9:29 AM  MRN: [id number]                     Account Number: [id number]  YOB: 1955             Age: 63  Gender: Female                        Note Status: Finalized  Attending MD: MIKAL DAY MD~SHAY  _______________________________________________________________________________  Procedure:            Colonoscopy  Indications:          Change in bowel habits  Providers:            MIKAL DAY MD~SHAY (Doctor), Leandra Guerrero  (Nurse), Sima Duke (Nurse)  Referring MD:         YIFAN MATHEWS DO~REID  Requesting Provider:  Medicines:            See the Anesthesia note for documentation of the  administered medications  Complications:        No immediate complications. Estimated blood loss: None.  _______________________________________________________________________________  Procedure:            After I obtained informed consent, the scope was passed  under direct vision. Throughout the procedure, the  patient's blood pressure, pulse, and oxygen saturations  were monitored continuously.The Colonoscope scope was  introduced through the anus and advanced to the  terminal ileum, with identification of the appendiceal  orifice and IC valve. The colonoscopy was performed  without difficulty. The patient tolerated the procedure  well.  Estimated Blood Loss: Estimated blood loss: none.  Findings:  The terminal ileum appeared normal. Biopsies were taken with a cold  forceps for histology.  The rectum and ascending colon appeared normal. Biopsies for histology  were taken with a cold forceps from the ascending colon and rectum for  evaluation of microscopic colitis.  The rectum, sigmoid colon, descending colon, transverse colon, ascending  colon and cecum appeared normal.  A few small-mouthed diverticula were found in the sigmoid  colon.  Impression:           - The examined portion of the ileum was normal.  Biopsied.  - The rectum and ascending colon are normal. Biopsied.  - The rectum, sigmoid colon, descending colon,  transverse colon, ascending colon and cecum are normal.  Recommendation:       - Repeat colonoscopy in 5-10 years for surveillance  based on pathology results.  - Return to my office in 3 months.  Procedure Code(s):    --- Professional ---  55201, Colonoscopy, flexible; with biopsy, single or  multiple  Diagnosis Code(s):    --- Professional ---  R19.4, Change in bowel habit  CPT copyright 2015 American Medical Association. All rights reserved.  The codes documented in this report are preliminary and upon  review may  be revised to meet current compliance requirements.  Attending Participation:  I personally performed the entire procedure.  __________________________  MIKAL DAY MD~SHAY  6/17/2019 10:33:58 AM  This report has been signed electronically.  Number of Addenda: 0  Note Initiated On: 6/17/2019 9:29 AM

## 2019-06-17 NOTE — H&P
"   GI Consult Note          Subjective   Adeola Rees is a 63 y.o. female who was admitted for Change in bowel function [R19.8]  Chronic GERD [K21.9]. Patient here for EGD & Colonoscopy  As discussed in office.         Past Medical History:   Diagnosis Date   • Osteoarthritis      Past Surgical History:   Procedure Laterality Date   • APPENDECTOMY     • HYSTERECTOMY     • OOPHORECTOMY     • TONSILLECTOMY       Allergies   Allergen Reactions   • Bee Sting [Bee Venom Protein (Honey Bee)] Anaphylaxis   • Honey    • Ciprofibrate    • Ciprofloxacin      Other reaction(s): Muscular pain   • Penicillin G    • Penicillins      Other reaction(s): Anaphylaxis   • Shellfish Derived Hives   • Sulfa (Sulfonamide Antibiotics)        Home Medications:  •  ginkgo biloba, Take 460 mg by mouth.  •  MULTIVIT,MIN52-FOLIC-VITK-CQ10 ORAL, Take by mouth.  •  gentamicin, INT 1 GTT IN EACH EYE QID  •  naproxen, Take 500 mg by mouth as needed.    •  tazarotene,         Physical Exam    Physical Exam  /78   Pulse 76   Temp 36.6 °C (97.8 °F) (Temporal)   Resp 16   Ht 1.676 m (5' 6\")   Wt 79.8 kg (176 lb)   SpO2 100%   BMI 28.41 kg/m²     General appearance: no distress  Head: normocephalic  Lungs: clear to auscultation anteriorly   Heart: regular rate   Abdomen: soft, non-tender; bowel sounds normal; no masses, no organomegaly  Neurologic: awake and alert and oriented        Assessment     1. See paper chart. Discussed EGD & Colonoscopy discussed procedure, technique, alternatives, risks and benefits in detail and consent signed.            Mamadou López MD  6/17/2019       "

## 2019-06-17 NOTE — OR SURGEON
Pre-Procedure patient identification:  I am the primary operating surgeon/proceduralist and I have identified the patient on 06/17/19 at 9:28 AM Mamadou López MD  Phone Number: [phone]

## 2019-06-19 LAB
CASE RPRT: NORMAL
CLINICAL INFO: NORMAL
IMMUNE STAIN STUDY: NORMAL
PATH REPORT.FINAL DX SPEC: NORMAL
PATH REPORT.FINAL DX SPEC: NORMAL
PATH REPORT.GROSS SPEC: NORMAL

## 2019-08-22 ENCOUNTER — RX ONLY (RX ONLY)
Age: 64
End: 2019-08-22

## 2019-08-22 ENCOUNTER — APPOINTMENT (OUTPATIENT)
Dept: URBAN - METROPOLITAN AREA CLINIC 200 | Age: 64
Setting detail: DERMATOLOGY
End: 2019-09-09

## 2019-08-22 DIAGNOSIS — L57.8 OTHER SKIN CHANGES DUE TO CHRONIC EXPOSURE TO NONIONIZING RADIATION: ICD-10-CM

## 2019-08-22 DIAGNOSIS — L21.8 OTHER SEBORRHEIC DERMATITIS: ICD-10-CM

## 2019-08-22 DIAGNOSIS — L70.0 ACNE VULGARIS: ICD-10-CM

## 2019-08-22 DIAGNOSIS — Z85.828 PERSONAL HISTORY OF OTHER MALIGNANT NEOPLASM OF SKIN: ICD-10-CM

## 2019-08-22 DIAGNOSIS — Z41.9 ENCOUNTER FOR PROCEDURE FOR PURPOSES OTHER THAN REMEDYING HEALTH STATE, UNSPECIFIED: ICD-10-CM

## 2019-08-22 PROBLEM — D48.5 NEOPLASM OF UNCERTAIN BEHAVIOR OF SKIN: Status: ACTIVE | Noted: 2019-08-22

## 2019-08-22 PROCEDURE — OTHER PRESCRIPTION MEDICATION MANAGEMENT: OTHER

## 2019-08-22 PROCEDURE — OTHER LIQUID NITROGEN (COSMETIC): OTHER

## 2019-08-22 PROCEDURE — 99213 OFFICE O/P EST LOW 20 MIN: CPT

## 2019-08-22 PROCEDURE — OTHER COUNSELING: OTHER

## 2019-08-22 RX ORDER — NYSTATIN AND TRIAMCINOLONE ACETONIDE 100000; 1 [USP'U]/G; MG/G
CREAM TOPICAL
Qty: 1 | Refills: 10 | Status: ERX | COMMUNITY
Start: 2019-08-22

## 2019-08-22 RX ORDER — SODIUM SULFACETAMIDE AND SULFUR 80; 40 MG/ML; MG/ML
LOTION TOPICAL
Qty: 1 | Refills: 6 | Status: ERX | COMMUNITY
Start: 2019-08-22

## 2019-08-22 RX ORDER — TAZAROTENE 1 MG/G
CREAM CUTANEOUS
Qty: 1 | Refills: 6 | Status: ERX | COMMUNITY
Start: 2019-08-22

## 2019-08-22 ASSESSMENT — LOCATION ZONE DERM
LOCATION ZONE: FACE
LOCATION ZONE: TRUNK
LOCATION ZONE: NECK
LOCATION ZONE: TRUNK
LOCATION ZONE: NECK

## 2019-08-22 ASSESSMENT — LOCATION SIMPLE DESCRIPTION DERM
LOCATION SIMPLE: CHEST
LOCATION SIMPLE: RIGHT BREAST
LOCATION SIMPLE: RIGHT CHEEK
LOCATION SIMPLE: ABDOMEN
LOCATION SIMPLE: TRAPEZIAL NECK
LOCATION SIMPLE: CHEST
LOCATION SIMPLE: LEFT ANTERIOR NECK

## 2019-08-22 ASSESSMENT — LOCATION DETAILED DESCRIPTION DERM
LOCATION DETAILED: SUBXIPHOID
LOCATION DETAILED: RIGHT INFRAMAMMARY CREASE (INNER QUADRANT)
LOCATION DETAILED: PERIUMBILICAL SKIN
LOCATION DETAILED: LEFT INFERIOR ANTERIOR NECK
LOCATION DETAILED: STERNAL NOTCH
LOCATION DETAILED: MID TRAPEZIAL NECK
LOCATION DETAILED: LEFT RIB CAGE
LOCATION DETAILED: RIGHT INFERIOR CENTRAL MALAR CHEEK
LOCATION DETAILED: LEFT MEDIAL SUPERIOR CHEST

## 2019-08-22 NOTE — PROCEDURE: LIQUID NITROGEN (COSMETIC)
Detail Level: Generalized
Consent: The patient's consent was obtained including but not limited to risks of crusting, scabbing, blistering, scarring, darker or lighter pigmentary change, recurrence, incomplete removal and infection. The patient understands that the procedure is cosmetic in nature and is not covered by insurance.
Post-Care Instructions: I reviewed with the patient in detail post-care instructions. Patient is to wear sunprotection, and avoid picking at any of the treated lesions. Pt may apply Vaseline to crusted or scabbing areas.
Price (Use Numbers Only, No Special Characters Or $): 25.00
Render Post-Care Instructions In Note?: no

## 2019-08-22 NOTE — PROCEDURE: PRESCRIPTION MEDICATION MANAGEMENT
Continue Regimen: Nystatin triamcinolone
Detail Level: Generalized
Render In Strict Bullet Format?: No
Continue Regimen: Tazorac, sulfacleanse

## 2019-11-05 ENCOUNTER — OFFICE VISIT (OUTPATIENT)
Dept: PRIMARY CARE | Facility: CLINIC | Age: 64
End: 2019-11-05
Payer: COMMERCIAL

## 2019-11-05 VITALS
WEIGHT: 182 LBS | HEIGHT: 66 IN | HEART RATE: 93 BPM | RESPIRATION RATE: 18 BRPM | BODY MASS INDEX: 29.25 KG/M2 | DIASTOLIC BLOOD PRESSURE: 82 MMHG | SYSTOLIC BLOOD PRESSURE: 126 MMHG | OXYGEN SATURATION: 98 %

## 2019-11-05 DIAGNOSIS — Z00.00 WELL ADULT HEALTH CHECK: Primary | ICD-10-CM

## 2019-11-05 DIAGNOSIS — K76.0 FATTY LIVER: ICD-10-CM

## 2019-11-05 DIAGNOSIS — Z13.220 SCREENING FOR LIPID DISORDERS: ICD-10-CM

## 2019-11-05 DIAGNOSIS — Z13.1 SCREENING FOR DIABETES MELLITUS (DM): ICD-10-CM

## 2019-11-05 PROCEDURE — 93000 ELECTROCARDIOGRAM COMPLETE: CPT | Performed by: FAMILY MEDICINE

## 2019-11-05 PROCEDURE — 99396 PREV VISIT EST AGE 40-64: CPT | Performed by: FAMILY MEDICINE

## 2019-11-05 RX ORDER — ASPIRIN/CALCIUM CARB/MAGNESIUM 325 MG
4 TABLET ORAL SEE ADMIN INSTRUCTIONS
COMMUNITY

## 2019-11-05 ASSESSMENT — ENCOUNTER SYMPTOMS
CONSTITUTIONAL NEGATIVE: 1
MUSCULOSKELETAL NEGATIVE: 1
GASTROINTESTINAL NEGATIVE: 1
RESPIRATORY NEGATIVE: 1
CARDIOVASCULAR NEGATIVE: 1
NEUROLOGICAL NEGATIVE: 1
EYES NEGATIVE: 1
HEMATOLOGIC/LYMPHATIC NEGATIVE: 1

## 2019-11-05 NOTE — PROGRESS NOTES
Subjective      Patient ID: Adeolabello Rees is a 63 y.o. female.  1955      HPI here for her annual physical.  Has some concerns with past lab work.    The following have been reviewed and updated as appropriate in this visit:  Tobacco  Allergies  Meds  Problems  Med Hx  Surg Hx  Fam Hx  Soc Hx        Review of Systems   Constitutional: Negative.    HENT: Negative.    Eyes: Negative.    Respiratory: Negative.    Cardiovascular: Negative.    Gastrointestinal: Negative.    Genitourinary: Negative.    Musculoskeletal: Negative.    Neurological: Negative.    Hematological: Negative.      Active Ambulatory Problems     Diagnosis Date Noted   • Left leg pain 03/13/2018   • Left leg numbness 04/17/2018   • Bite, insect 07/10/2018   • Chronic constipation 02/14/2019   • Abdominal bloating 02/14/2019   • Fecal impaction (CMS/HCC) 02/14/2019   • Mental status change resolved 04/09/2019   • Headache, chronic daily 04/09/2019   • Memory loss 04/09/2019   • Well adult health check 11/05/2019   • Fatty liver 11/05/2019   • Screening for lipid disorders 11/05/2019   • Screening for diabetes mellitus (DM) 11/05/2019     Resolved Ambulatory Problems     Diagnosis Date Noted   • No Resolved Ambulatory Problems     Past Medical History:   Diagnosis Date   • Osteoarthritis        Current Outpatient Medications:   •  ginkgo biloba 40 mg tablet, Take 460 mg by mouth., Disp: , Rfl:   •  MULTIVIT,MIN52-FOLIC-VITK-CQ10 ORAL, Take by mouth., Disp: , Rfl:   •  nicotine polacrilex (COMMIT) 4 mg lozenge, Apply 4 mg to cheek every 2 (two) hours as needed for smoking cessation., Disp: , Rfl:   •  gentamicin (GARAMYCIN) 0.3 % ophthalmic solution, INT 1 GTT IN EACH EYE QID, Disp: , Rfl: 0  •  naproxen (NAPROSYN) 500 mg tablet, Take 500 mg by mouth as needed.  , Disp: , Rfl:   •  tazarotene (AVAGE) 0.1 % cream, , Disp: , Rfl:   Allergies   Allergen Reactions   • Bee Sting [Bee Venom Protein (Honey Bee)] Anaphylaxis   • Honey    •  Ciprofibrate    • Ciprofloxacin      Other reaction(s): Muscular pain   • Penicillin G    • Penicillins      Other reaction(s): Anaphylaxis   • Shellfish Derived Hives   • Sulfa (Sulfonamide Antibiotics)      Social History     Socioeconomic History   • Marital status:      Spouse name: None   • Number of children: None   • Years of education: None   • Highest education level: None   Occupational History   • None   Social Needs   • Financial resource strain: None   • Food insecurity:     Worry: None     Inability: None   • Transportation needs:     Medical: None     Non-medical: None   Tobacco Use   • Smoking status: Former Smoker     Packs/day: 1.00     Years: 48.00     Pack years: 48.00     Types: Cigarettes     Start date: 1968     Last attempt to quit: 2016     Years since quittin.9   • Smokeless tobacco: Current User   • Tobacco comment: Uses lozenges   Substance and Sexual Activity   • Alcohol use: No   • Drug use: None   • Sexual activity: None   Lifestyle   • Physical activity:     Days per week: None     Minutes per session: None   • Stress: None   Relationships   • Social connections:     Talks on phone: None     Gets together: None     Attends Rastafari service: None     Active member of club or organization: None     Attends meetings of clubs or organizations: None     Relationship status: None   • Intimate partner violence:     Fear of current or ex partner: None     Emotionally abused: None     Physically abused: None     Forced sexual activity: None   Other Topics Concern   • None   Social History Narrative   • None     Family History   Problem Relation Age of Onset   • Hypertension Biological Mother    • Hypertension Biological Father    • Thyroid cancer Biological Sister        Past Surgical History:   Procedure Laterality Date   • APPENDECTOMY     • HYSTERECTOMY     • OOPHORECTOMY     • TONSILLECTOMY         Objective     Vitals:    19 1535   BP: 126/82   Pulse: 93   Resp:  "18   SpO2: 98%   Weight: 82.6 kg (182 lb)   Height: 1.676 m (5' 6\")     Body mass index is 29.38 kg/m².    Physical Exam   Constitutional: She is oriented to person, place, and time. She appears well-developed and well-nourished.   HENT:   Head: Normocephalic and atraumatic.   Eyes: Pupils are equal, round, and reactive to light. EOM are normal.   Cardiovascular: Normal rate and regular rhythm.   Pulmonary/Chest: Effort normal.   Abdominal: Soft.   Musculoskeletal: Normal range of motion.   Neurological: She is alert and oriented to person, place, and time.   Skin: Skin is warm.   Nursing note and vitals reviewed.      Assessment/Plan   Diagnoses and all orders for this visit:    Well adult health check (Primary)  -     ECG 12 LEAD OFFICE PERFORMED  -     CBC and Differential; Future    Fatty liver  Assessment & Plan:  See labs    Orders:  -     CBC and Differential; Future  -     Comprehensive metabolic panel; Future    Screening for lipid disorders  Assessment & Plan:  See labs    Orders:  -     CBC and Differential; Future  -     Comprehensive metabolic panel; Future  -     Lipid panel; Future    Screening for diabetes mellitus (DM)  Assessment & Plan:  See labs    Orders:  -     CBC and Differential; Future  -     Comprehensive metabolic panel; Future      "

## 2019-11-05 NOTE — PATIENT INSTRUCTIONS
Patient Education   Health Maintenance, Female  Adopting a healthy lifestyle and getting preventive care can go a long way to promote health and wellness. Talk with your health care provider about what schedule of regular examinations is right for you. This is a good chance for you to check in with your provider about disease prevention and staying healthy.  In between checkups, there are plenty of things you can do on your own. Experts have done a lot of research about which lifestyle changes and preventive measures are most likely to keep you healthy. Ask your health care provider for more information.  Weight and diet  Eat a healthy diet  · Be sure to include plenty of vegetables, fruits, low-fat dairy products, and lean protein.  · Do not eat a lot of foods high in solid fats, added sugars, or salt.  · Get regular exercise. This is one of the most important things you can do for your health.  ? Most adults should exercise for at least 150 minutes each week. The exercise should increase your heart rate and make you sweat (moderate-intensity exercise).  ? Most adults should also do strengthening exercises at least twice a week. This is in addition to the moderate-intensity exercise.  Maintain a healthy weight  · Body mass index (BMI) is a measurement that can be used to identify possible weight problems. It estimates body fat based on height and weight. Your health care provider can help determine your BMI and help you achieve or maintain a healthy weight.  · For females 20 years of age and older:  ? A BMI below 18.5 is considered underweight.  ? A BMI of 18.5 to 24.9 is normal.  ? A BMI of 25 to 29.9 is considered overweight.  ? A BMI of 30 and above is considered obese.  Watch levels of cholesterol and blood lipids  · You should start having your blood tested for lipids and cholesterol at 20 years of age, then have this test every 5 years.  · You may need to have your cholesterol levels checked more often  if:  ? Your lipid or cholesterol levels are high.  ? You are older than 50 years of age.  ? You are at high risk for heart disease.  Cancer screening  Lung Cancer  · Lung cancer screening is recommended for adults 55-80 years old who are at high risk for lung cancer because of a history of smoking.  · A yearly low-dose CT scan of the lungs is recommended for people who:  ? Currently smoke.  ? Have quit within the past 15 years.  ? Have at least a 30-pack-year history of smoking. A pack year is smoking an average of one pack of cigarettes a day for 1 year.  · Yearly screening should continue until it has been 15 years since you quit.  · Yearly screening should stop if you develop a health problem that would prevent you from having lung cancer treatment.  Breast Cancer  · Practice breast self-awareness. This means understanding how your breasts normally appear and feel.  · It also means doing regular breast self-exams. Let your health care provider know about any changes, no matter how small.  · If you are in your 20s or 30s, you should have a clinical breast exam (CBE) by a health care provider every 1-3 years as part of a regular health exam.  · If you are 40 or older, have a CBE every year. Also consider having a breast X-ray (mammogram) every year.  · If you have a family history of breast cancer, talk to your health care provider about genetic screening.  · If you are at high risk for breast cancer, talk to your health care provider about having an MRI and a mammogram every year.  · Breast cancer gene (BRCA) assessment is recommended for women who have family members with BRCA-related cancers. BRCA-related cancers include:  ? Breast.  ? Ovarian.  ? Tubal.  ? Peritoneal cancers.  · Results of the assessment will determine the need for genetic counseling and BRCA1 and BRCA2 testing.  Cervical Cancer  Your health care provider may recommend that you be screened regularly for cancer of the pelvic organs (ovaries,  uterus, and vagina). This screening involves a pelvic examination, including checking for microscopic changes to the surface of your cervix (Pap test). You may be encouraged to have this screening done every 3 years, beginning at age 21.  · For women ages 30-65, health care providers may recommend pelvic exams and Pap testing every 3 years, or they may recommend the Pap and pelvic exam, combined with testing for human papilloma virus (HPV), every 5 years. Some types of HPV increase your risk of cervical cancer. Testing for HPV may also be done on women of any age with unclear Pap test results.  · Other health care providers may not recommend any screening for nonpregnant women who are considered low risk for pelvic cancer and who do not have symptoms. Ask your health care provider if a screening pelvic exam is right for you.  · If you have had past treatment for cervical cancer or a condition that could lead to cancer, you need Pap tests and screening for cancer for at least 20 years after your treatment. If Pap tests have been discontinued, your risk factors (such as having a new sexual partner) need to be reassessed to determine if screening should resume. Some women have medical problems that increase the chance of getting cervical cancer. In these cases, your health care provider may recommend more frequent screening and Pap tests.  Colorectal Cancer  · This type of cancer can be detected and often prevented.  · Routine colorectal cancer screening usually begins at 50 years of age and continues through 75 years of age.  · Your health care provider may recommend screening at an earlier age if you have risk factors for colon cancer.  · Your health care provider may also recommend using home test kits to check for hidden blood in the stool.  · A small camera at the end of a tube can be used to examine your colon directly (sigmoidoscopy or colonoscopy). This is done to check for the earliest forms of colorectal  cancer.  · Routine screening usually begins at age 50.  · Direct examination of the colon should be repeated every 5-10 years through 75 years of age. However, you may need to be screened more often if early forms of precancerous polyps or small growths are found.  Skin Cancer  · Check your skin from head to toe regularly.  · Tell your health care provider about any new moles or changes in moles, especially if there is a change in a mole's shape or color.  · Also tell your health care provider if you have a mole that is larger than the size of a pencil eraser.  · Always use sunscreen. Apply sunscreen liberally and repeatedly throughout the day.  · Protect yourself by wearing long sleeves, pants, a wide-brimmed hat, and sunglasses whenever you are outside.  Heart disease, diabetes, and high blood pressure  · High blood pressure causes heart disease and increases the risk of stroke. High blood pressure is more likely to develop in:  ? People who have blood pressure in the high end of the normal range (130-139/85-89 mm Hg).  ? People who are overweight or obese.  ? People who are .  · If you are 18-39 years of age, have your blood pressure checked every 3-5 years. If you are 40 years of age or older, have your blood pressure checked every year. You should have your blood pressure measured twice--once when you are at a hospital or clinic, and once when you are not at a hospital or clinic. Record the average of the two measurements. To check your blood pressure when you are not at a hospital or clinic, you can use:  ? An automated blood pressure machine at a pharmacy.  ? A home blood pressure monitor.  · If you are between 55 years and 79 years old, ask your health care provider if you should take aspirin to prevent strokes.  · Have regular diabetes screenings. This involves taking a blood sample to check your fasting blood sugar level.  ? If you are at a normal weight and have a low risk for diabetes,  have this test once every three years after 45 years of age.  ? If you are overweight and have a high risk for diabetes, consider being tested at a younger age or more often.  Preventing infection  Hepatitis B  · If you have a higher risk for hepatitis B, you should be screened for this virus. You are considered at high risk for hepatitis B if:  ? You were born in a country where hepatitis B is common. Ask your health care provider which countries are considered high risk.  ? Your parents were born in a high-risk country, and you have not been immunized against hepatitis B (hepatitis B vaccine).  ? You have HIV or AIDS.  ? You use needles to inject street drugs.  ? You live with someone who has hepatitis B.  ? You have had sex with someone who has hepatitis B.  ? You get hemodialysis treatment.  ? You take certain medicines for conditions, including cancer, organ transplantation, and autoimmune conditions.  Hepatitis C  · Blood testing is recommended for:  ? Everyone born from 1945 through 1965.  ? Anyone with known risk factors for hepatitis C.  Sexually transmitted infections (STIs)  · You should be screened for sexually transmitted infections (STIs) including gonorrhea and chlamydia if:  ? You are sexually active and are younger than 24 years of age.  ? You are older than 24 years of age and your health care provider tells you that you are at risk for this type of infection.  ? Your sexual activity has changed since you were last screened and you are at an increased risk for chlamydia or gonorrhea. Ask your health care provider if you are at risk.  · If you do not have HIV, but are at risk, it may be recommended that you take a prescription medicine daily to prevent HIV infection. This is called pre-exposure prophylaxis (PrEP). You are considered at risk if:  ? You are sexually active and do not regularly use condoms or know the HIV status of your partner(s).  ? You take drugs by injection.  ? You are sexually  active with a partner who has HIV.  Talk with your health care provider about whether you are at high risk of being infected with HIV. If you choose to begin PrEP, you should first be tested for HIV. You should then be tested every 3 months for as long as you are taking PrEP.  Pregnancy  · If you are premenopausal and you may become pregnant, ask your health care provider about preconception counseling.  · If you may become pregnant, take 400 to 800 micrograms (mcg) of folic acid every day.  · If you want to prevent pregnancy, talk to your health care provider about birth control (contraception).  Osteoporosis and menopause  · Osteoporosis is a disease in which the bones lose minerals and strength with aging. This can result in serious bone fractures. Your risk for osteoporosis can be identified using a bone density scan.  · If you are 65 years of age or older, or if you are at risk for osteoporosis and fractures, ask your health care provider if you should be screened.  · Ask your health care provider whether you should take a calcium or vitamin D supplement to lower your risk for osteoporosis.  · Menopause may have certain physical symptoms and risks.  · Hormone replacement therapy may reduce some of these symptoms and risks.  Talk to your health care provider about whether hormone replacement therapy is right for you.  Follow these instructions at home:  · Schedule regular health, dental, and eye exams.  · Stay current with your immunizations.  · Do not use any tobacco products including cigarettes, chewing tobacco, or electronic cigarettes.  · If you are pregnant, do not drink alcohol.  · If you are breastfeeding, limit how much and how often you drink alcohol.  · Limit alcohol intake to no more than 1 drink per day for nonpregnant women. One drink equals 12 ounces of beer, 5 ounces of wine, or 1½ ounces of hard liquor.  · Do not use street drugs.  · Do not share needles.  · Ask your health care provider for  help if you need support or information about quitting drugs.  · Tell your health care provider if you often feel depressed.  · Tell your health care provider if you have ever been abused or do not feel safe at home.  This information is not intended to replace advice given to you by your health care provider. Make sure you discuss any questions you have with your health care provider.  Document Released: 07/02/2012 Document Revised: 05/25/2017 Document Reviewed: 09/20/2016  [employer] Interactive Patient Education © 2019 [employer]

## 2019-11-21 LAB
ALBUMIN SERPL-MCNC: 4 G/DL (ref 3.6–5.1)
ALBUMIN/GLOB SERPL: 1.7 (CALC) (ref 1–2.5)
ALP SERPL-CCNC: 71 U/L (ref 33–130)
ALT SERPL-CCNC: 23 U/L (ref 6–29)
AST SERPL-CCNC: 18 U/L (ref 10–35)
BASOPHILS # BLD AUTO: 48 CELLS/UL (ref 0–200)
BASOPHILS NFR BLD AUTO: 1 %
BILIRUB SERPL-MCNC: 0.5 MG/DL (ref 0.2–1.2)
BUN SERPL-MCNC: 13 MG/DL (ref 7–25)
BUN/CREAT SERPL: NORMAL (CALC) (ref 6–22)
CALCIUM SERPL-MCNC: 9.2 MG/DL (ref 8.6–10.4)
CHLORIDE SERPL-SCNC: 105 MMOL/L (ref 98–110)
CHOLEST SERPL-MCNC: 222 MG/DL
CHOLEST/HDLC SERPL: 4.6 (CALC)
CO2 SERPL-SCNC: 29 MMOL/L (ref 20–32)
CREAT SERPL-MCNC: 0.86 MG/DL (ref 0.5–0.99)
EOSINOPHIL # BLD AUTO: 326 CELLS/UL (ref 15–500)
EOSINOPHIL NFR BLD AUTO: 6.8 %
ERYTHROCYTE [DISTWIDTH] IN BLOOD BY AUTOMATED COUNT: 12.3 % (ref 11–15)
GLOBULIN SER CALC-MCNC: 2.3 G/DL (CALC) (ref 1.9–3.7)
GLUCOSE SERPL-MCNC: 82 MG/DL (ref 65–99)
HCT VFR BLD AUTO: 42.1 % (ref 35–45)
HDLC SERPL-MCNC: 48 MG/DL
HGB BLD-MCNC: 13.8 G/DL (ref 11.7–15.5)
LDLC SERPL CALC-MCNC: 150 MG/DL (CALC)
LYMPHOCYTES # BLD AUTO: 1627 CELLS/UL (ref 850–3900)
LYMPHOCYTES NFR BLD AUTO: 33.9 %
MCH RBC QN AUTO: 30.6 PG (ref 27–33)
MCHC RBC AUTO-ENTMCNC: 32.8 G/DL (ref 32–36)
MCV RBC AUTO: 93.3 FL (ref 80–100)
MONOCYTES # BLD AUTO: 485 CELLS/UL (ref 200–950)
MONOCYTES NFR BLD AUTO: 10.1 %
NEUTROPHILS # BLD AUTO: 2314 CELLS/UL (ref 1500–7800)
NEUTROPHILS NFR BLD AUTO: 48.2 %
NONHDLC SERPL-MCNC: 174 MG/DL (CALC)
PLATELET # BLD AUTO: 264 THOUSAND/UL (ref 140–400)
PMV BLD REES-ECKER: 9.7 FL (ref 7.5–12.5)
POTASSIUM SERPL-SCNC: 4.4 MMOL/L (ref 3.5–5.3)
PROT SERPL-MCNC: 6.3 G/DL (ref 6.1–8.1)
QUEST EGFR NON-AFR. AMERICAN: 73 ML/MIN/1.73M2
RBC # BLD AUTO: 4.51 MILLION/UL (ref 3.8–5.1)
SODIUM SERPL-SCNC: 140 MMOL/L (ref 135–146)
TRIGL SERPL-MCNC: 122 MG/DL
WBC # BLD AUTO: 4.8 THOUSAND/UL (ref 3.8–10.8)

## 2019-11-26 ENCOUNTER — TELEPHONE (OUTPATIENT)
Dept: PRIMARY CARE | Facility: CLINIC | Age: 64
End: 2019-11-26

## 2019-11-26 RX ORDER — AZITHROMYCIN 250 MG/1
TABLET, FILM COATED ORAL
Qty: 6 TABLET | Refills: 1 | Status: SHIPPED | OUTPATIENT
Start: 2019-11-26 | End: 2019-12-01

## 2019-11-26 NOTE — TELEPHONE ENCOUNTER
Libby, can you triage the message.        Message     Appointment Request From: Adeola Rees      With Provider: Bryce Allen DO [[facility] in [facility]]      Preferred Date Range: Any      Preferred Times: Any time      Reason for visit: Annual Physical      Comments:   Since evening of my last appointment on Nov 12, I have been ill. Probably a sinus infection that has now gone into chest. Exhausted with chills, aches, headache, NO fever. Think I need antibiotic. This is not going away on its own.

## 2020-05-29 ENCOUNTER — APPOINTMENT (OUTPATIENT)
Dept: LAB | Age: 65
End: 2020-05-29
Attending: FAMILY MEDICINE
Payer: COMMERCIAL

## 2020-05-29 DIAGNOSIS — Z13.220 SCREENING FOR LIPID DISORDERS: ICD-10-CM

## 2020-05-29 DIAGNOSIS — Z00.00 WELL ADULT HEALTH CHECK: ICD-10-CM

## 2020-05-29 DIAGNOSIS — Z13.1 SCREENING FOR DIABETES MELLITUS (DM): ICD-10-CM

## 2020-05-29 DIAGNOSIS — K76.0 FATTY LIVER: ICD-10-CM

## 2020-05-29 LAB
ALBUMIN SERPL-MCNC: 4.2 G/DL (ref 3.4–5)
ALP SERPL-CCNC: 69 IU/L (ref 35–126)
ALT SERPL-CCNC: 38 IU/L (ref 11–54)
ANION GAP SERPL CALC-SCNC: 12 MEQ/L (ref 3–15)
AST SERPL-CCNC: 37 IU/L (ref 15–41)
BASOPHILS # BLD: 0.04 K/UL (ref 0.01–0.1)
BASOPHILS NFR BLD: 0.9 %
BILIRUB SERPL-MCNC: 1.3 MG/DL (ref 0.3–1.2)
BUN SERPL-MCNC: 8 MG/DL (ref 8–20)
CALCIUM SERPL-MCNC: 9.4 MG/DL (ref 8.9–10.3)
CHLORIDE SERPL-SCNC: 103 MEQ/L (ref 98–109)
CHOLEST SERPL-MCNC: 234 MG/DL
CO2 SERPL-SCNC: 25 MEQ/L (ref 22–32)
CREAT SERPL-MCNC: 0.8 MG/DL (ref 0.6–1.1)
DIFFERENTIAL METHOD BLD: NORMAL
EOSINOPHIL # BLD: 0.21 K/UL (ref 0.04–0.36)
EOSINOPHIL NFR BLD: 4.7 %
ERYTHROCYTE [DISTWIDTH] IN BLOOD BY AUTOMATED COUNT: 12.3 % (ref 11.7–14.4)
GFR SERPL CREATININE-BSD FRML MDRD: >60 ML/MIN/1.73M*2
GLUCOSE SERPL-MCNC: 75 MG/DL (ref 70–99)
HCT VFR BLDCO AUTO: 43.7 % (ref 35–45)
HDLC SERPL-MCNC: 51 MG/DL
HDLC SERPL: 4.6 {RATIO}
HGB BLD-MCNC: 14.6 G/DL (ref 11.8–15.7)
IMM GRANULOCYTES # BLD AUTO: 0.02 K/UL (ref 0–0.08)
IMM GRANULOCYTES NFR BLD AUTO: 0.4 %
LDLC SERPL CALC-MCNC: 163 MG/DL
LYMPHOCYTES # BLD: 1.6 K/UL (ref 1.2–3.5)
LYMPHOCYTES NFR BLD: 36 %
MCH RBC QN AUTO: 30.9 PG (ref 28–33.2)
MCHC RBC AUTO-ENTMCNC: 33.4 G/DL (ref 32.2–35.5)
MCV RBC AUTO: 92.4 FL (ref 83–98)
MONOCYTES # BLD: 0.48 K/UL (ref 0.28–0.8)
MONOCYTES NFR BLD: 10.8 %
NEUTROPHILS # BLD: 2.1 K/UL (ref 1.7–7)
NEUTS SEG NFR BLD: 47.2 %
NONHDLC SERPL-MCNC: 183 MG/DL
NRBC BLD-RTO: 0 %
PDW BLD AUTO: 9.8 FL (ref 9.4–12.3)
PLATELET # BLD AUTO: 253 K/UL (ref 150–369)
POTASSIUM SERPL-SCNC: 4.9 MEQ/L (ref 3.6–5.1)
PROT SERPL-MCNC: 6.5 G/DL (ref 6–8.2)
RBC # BLD AUTO: 4.73 M/UL (ref 3.93–5.22)
SODIUM SERPL-SCNC: 140 MEQ/L (ref 136–144)
TRIGL SERPL-MCNC: 99 MG/DL (ref 30–149)
WBC # BLD AUTO: 4.45 K/UL (ref 3.8–10.5)

## 2020-05-29 PROCEDURE — 80061 LIPID PANEL: CPT

## 2020-05-29 PROCEDURE — 36415 COLL VENOUS BLD VENIPUNCTURE: CPT

## 2020-05-29 PROCEDURE — 85025 COMPLETE CBC W/AUTO DIFF WBC: CPT

## 2020-05-29 PROCEDURE — 80053 COMPREHEN METABOLIC PANEL: CPT

## 2020-06-23 ENCOUNTER — TELEPHONE (OUTPATIENT)
Dept: HEMATOLOGY/ONCOLOGY | Facility: HOSPITAL | Age: 65
End: 2020-06-23

## 2020-06-23 DIAGNOSIS — Z12.2 ENCOUNTER FOR SCREENING FOR LUNG CANCER: Primary | ICD-10-CM

## 2020-06-25 ENCOUNTER — HOSPITAL ENCOUNTER (OUTPATIENT)
Dept: RADIOLOGY | Age: 65
Discharge: HOME | End: 2020-06-25
Attending: FAMILY MEDICINE
Payer: COMMERCIAL

## 2020-06-25 ENCOUNTER — TELEPHONE (OUTPATIENT)
Dept: PULMONOLOGY | Facility: HOSPITAL | Age: 65
End: 2020-06-25

## 2020-06-25 VITALS — BODY MASS INDEX: 27 KG/M2 | HEIGHT: 67 IN | WEIGHT: 172 LBS

## 2020-06-25 DIAGNOSIS — Z13.820 SCREENING FOR OSTEOPOROSIS: ICD-10-CM

## 2020-06-25 PROCEDURE — 77080 DXA BONE DENSITY AXIAL: CPT

## 2020-06-30 ENCOUNTER — HOSPITAL ENCOUNTER (OUTPATIENT)
Dept: RADIOLOGY | Age: 65
Discharge: HOME | End: 2020-06-30
Attending: FAMILY MEDICINE
Payer: COMMERCIAL

## 2020-06-30 DIAGNOSIS — Z12.2 ENCOUNTER FOR SCREENING FOR LUNG CANCER: ICD-10-CM

## 2020-06-30 PROCEDURE — G0297 LDCT FOR LUNG CA SCREEN: HCPCS

## 2020-08-19 NOTE — PROGRESS NOTES
[facility] Encounter Note    Date: 9/15/2020    Name: Adeola Rees    : 1955    Reason for visit:     Allergies: Bee sting [bee venom protein (honey bee)]; Honey; Penicillin g; Penicillins; Sulfa (sulfonamide antibiotics); Ceftin [cefuroxime axetil]; Ciprofibrate; Ciprofloxacin; and Shellfish derived    Medications:  []    Supplements: []    Family History:   Family History   Problem Relation Age of Onset   • Hypertension Biological Mother    • Obesity Biological Mother    • Kidney disease Biological Mother    • Hypertension Biological Father    • Obesity Biological Father    • Heart disease Biological Father    • Diabetes Biological Father    • Arthritis Biological Father    • Thyroid cancer Biological Sister    • Cancer Biological Sister    • ADD / ADHD Biological Sister    • Hypertension Biological Brother    • Eczema Biological Brother    • Heart disease Maternal Grandmother    • Hypertension Maternal Grandmother    • Obesity Maternal Grandmother    • Depression Maternal Grandmother    • Hypertension Maternal Grandfather    • Heart disease Maternal Grandfather    • Stroke Maternal Grandfather    • Dementia Paternal Grandmother    • Diabetes Paternal Grandfather    • Cancer Paternal Grandfather          Social History:   Social History     Socioeconomic History   • Marital status:      Spouse name: None   • Number of children: None   • Years of education: None   • Highest education level: None   Occupational History   • None   Social Needs   • Financial resource strain: None   • Food insecurity:     Worry: None     Inability: None   • Transportation needs:     Medical: None     Non-medical: None   Tobacco Use   • Smoking status: Former Smoker     Packs/day: 1.00     Years: 48.00     Pack years: 48.00     Types: Cigarettes     Start date: 1968     Last attempt to quit: 2016     Years since quitting: 3.7   • Smokeless tobacco: Current User   • Tobacco  "comment: Uses lozenges   Substance and Sexual Activity   • Alcohol use: No   • Drug use: Not Currently   • Sexual activity: None   Lifestyle   • Physical activity:     Days per week: None     Minutes per session: None   • Stress: None   Relationships   • Social connections:     Talks on phone: None     Gets together: None     Attends Anabaptism service: None     Active member of club or organization: None     Attends meetings of clubs or organizations: None     Relationship status: None   • Intimate partner violence:     Fear of current or ex partner: None     Emotionally abused: None     Physically abused: None     Forced sexual activity: None   Other Topics Concern   • None   Social History Narrative   • None        Vitals:   Visit Vitals  /86 (BP Location: Left upper arm, Patient Position: Sitting)   Pulse 73   Temp 36.7 °C (98 °F) (Temporal)   Resp 16   Ht 1.638 m (5' 4.5\")   Wt 78.3 kg (172 lb 9.6 oz)   SpO2 98%   BMI 29.17 kg/m²       Weight: Weight: 78.3 kg (172 lb 9.6 oz)     Height: Height: 163.8 cm (5' 4.5\")     BMI:Body mass index is 29.17 kg/m².      NSR:      HPI  64 yr old new patient for first visit.  Fatty liver.   Down 10 lbs in one year?  Sees Alejandro.    Zero on CAC.  Eats lots of variety of veggies.  Loves to cook.      Chiro told for the back, due to the DJD?  Chiro Dr Rowe, told needed magnesium, messed up neck from a trauma.  Chiro would try to loosen up the neck.  Gave powdered magnesium.  Neck was the same.  Then found calm with Mag, and Calcium.  Told osteopenia, then added calcium.  Pees at night if takes at bedtime.     Raspberry gummy of magnesium CALM product without the calcium takes at night.    No kids, bladder problems Ok exc mild stress.      Vit D K and B12 taken  Neuropathy? left leg and muscles weak and atrophy,     Smashed it when fasting; was doing 3 day water fast 2 years ago, made more hyper and aware.  14 days with water fast in the past even.   [place] a  " Bunny online about fasting.    Was moving heavy furniture while fasting, and brought furniture movers in and took phone calls and then tried to move the furniture and hit the corner of the cabinet and her head and knocked herself out, could not remember what happened.   Next morning, paralyzed from the left leg, had been fasting and dehydrated.  Had to crawl on the floor and go to a mammogram.  Could not stand up and lots of pain with cramps in the legs so couldn't walk.   Went to urgent care, on [place]ooted on butt into [place].  Cramps and thirsty.  Took 3 weeks to get better and remember about the head trauma.   Then cramps got better, left leg at night gets cramps.  Weak in the legs?   Old knot in mid thigh.     Did MRI of the back only in 2018 at that time.  Used to do exercise classes.     Cholesterol is high, always told that diet and exercise would help.    Black cohosh and Estrogen bothered her.    Colon and EGD and told indigestion for 10 years.  Dr López told was normal.  Diverticuli.  ALT 28.     Chol and liver fatty  Supplements  Stress    Keto diet, swelled up after 4 - 5 months, lost weight, went off and eventually regained weight.    Stopped the Keto Feb 2019.  Weight was about 168 low in 2019, went off, regained to 182.  By being Vegan and restricted calories and stopping eating at 3 pm, lost 10 lbs.      [facility].  Did a lecture on coffee is bad, changed to only one a day.  Kambucha made her sick.  Sour kraut, tough to get down.  Trying to put in food.     Nicotine lozenges, 5 years off it.  4 a day only.  COPD told.  Now on COPD on xray.    No inhalers, gets wheezing some times.      Garden zucchini, tomatoes, Fig tree  HIBISCUS TEA>    GLUTEN FREE:  EGD told was smooth villi< not really celiac?  tOLD BLOOD TEST WAS NEGATIVE.  Burning and heartburn from the   Mom tried to kill herself in the 50's [employer] and tried to kill herself while pregnant.    Has adopted two kids from [place].  Are with special needs.     GM's body.   Chol  FHX of CAD/DM     No swelling, mosquito bite.   Reverse Osmosis, himalayan salt.      Does intermittent fasting,  Cooks for her family.    7 - 8 am only nuts.   Stop by 5 pm, go 14 - 15 hours with fast sometimes only.      May  Water fast.   Stopped smoking    Water damage in the basement, has had it sprayed.  Barrier on the floor.  Other leaking spots in the house.  HEPA  where she exercises     Teeth worked up in Dec on deep decay and periodontal problems.  Was getting sinus infections.  Used to get antibiotics.   Coughed up blood with the antibiotic once.     Sinus infection sick in January 2020.    Rash when fasts for two weeks.   Wakes up with heart racing sometimes.   Polyp in her nose?  ENT recommended to see, not done yet.      Review of Systems   Left leg hip flexor is weaker than the right.   Bowels seem to effect her back and left leg.  It gives out on her. If fix back would affect her bowels?  MRI 2018 L4-5 , L5-S1 discs.    Physical Exam  Negative straight leg raising.  NL ROM of hips.  Feet, great toes and quad strength 5/5 both legs.  Hip flexor only 3/5 left, 5/5 right.   No edema, Nl pulses.        Labs: cmp and cbc NL   (was 162 May 2020), HDL 47    MSQ Score: 78    ASSESSMENT: []  1. The patient reviewed and signed our practice consents at their first visit, including their pledge to obtain primary care services from a primary care physician and that we will not serve the role, and also will not bill insurance, as this is a cash only practice.  We also discussed the nature of an integrative medicine practice, and that we are offering treatments that are complementary or alternative to traditional medical treatments, particularly when those treatments have not been successful in making the patient healthier.  Most patients come to this practice looking for treatments that are less toxic, and are also  looking for approaches that are innovative or different from the traditional medical approach, with the hope of finding greater health through these other approaches.  The patient understands that these treatments are often less studied then medications approved by the FDA, and they still might have side effects, that require monitoring and follow-up visits in order to assess the patient's response to any treatments we recommend.  The patient also pledges to advise their primary care physician of any treatment programs we undertake.  Patient is free to call me, or communicate via email through our portal, with any questions about treatments we propose.    2. Overweight BMI 29  3. Hepatic steatosis on US with normal ALT.  Visceral fat level of 20, PBF 44 on GAIL  4. Hypercholesterolemia  5. FHx of CAD and high cholesterol.  CAC just done is ZERO  6. F obese and had DM  7. Hx of elevated BP without HBP on no meds at present.   8. Osteopenia, Hip -1.4 T score  9. Questionable Celiac Disease with flattened villi and lymphocytosis, but not conclusive.  6/2019 bx on EGD and Colon also with bx in Ileum all negative, no H pylori.  Had dyspepsia which cleared when went gluten free however.   10. Snoring, fatigue:  Sleep apnea?  11. DANILO, hx of depression, no meds  12. Hx of PCOS and Infertility  13. Stress from two adopted adult children with special needs.   14. Ex-smoker on nicotine lozenge 4 a day  15. COPD, mild?  16. RUL stable nodule in lung  17. Menopause at age 39 - 40 with LINH then BSO due to endometriosis/fibroids, Estrogen dominant?  18. Back pain after injured herself trying to move furniture in 2018, left leg weakness since then.  MRI showed L4-5 and L5-S1 disc protrusion and DJD.  Appears to have left hip flexor weakness , Psoas injury?    19. Tenderness over the left shin, related to back, or local problem?  20. TMJ  21. Periodontal disease, root canals, infected?  22. Abdomen bloating, change in bowels off and  on, food intolerances;  Multiple antibiotics and Appendix as a child, more antibiotics for acne as a teen, more for sinus infections:  godfrey has altered microbiome/dysbiosis and SIBO likely  23. Varicose veins mild  24. Tension headaches, stress/anxiety/sleep?  25. Allergies  26. Rash after fasting?  27. Anaphyllaxis to PCN, Sulfa  28. Hx of HPV   29. Multiple sensitivities to smoke, paint, perfumes, noises, EMF's.  Detox enzyme SNP's?  30. Water damage to home with possible bacterial and mold toxin exposures.   31. Also probable concussion in 2018      PLAN:[] Please see matrix and timeline  1. Reviewed her intake materials , labs and other records in EP[facility]  2. We talked about many things today.  The patient's main concerns involve the presence of fatty liver, hypercholesterolemia, obesity, osteopenia, and possible sources of gut inflammation.  We discussed several ways in which we can address these problems, mainly through nutritional changes, other lifestyle changes, appropriate exercise, stress reduction, and supplementation.  3. First we will gather more information, using the [facility] laboratory, I ordered nutritional testing, a food allergy panel as a screen, TGF-beta 1 to screen for chronic inflammatory response syndrome from water damage buildings, thyroid studies, heavy metals, and others.  Lab slip given.  4. We will begin additional evaluation with a small intestine bacterial overgrowth breath test with lactulose.  Patient will be sent the kit and instructions and is to do at home.   5. Consider comprehensive stool exam, consider ZRT four-point cortisol testing for circadian rhythm due to her expected altered stress response, and 87 food IgG blood allergy testing.  6. After she does the blood test and breath test, she may begin berberine 500 mg 1 twice a day products from Dr. Parkinson, [employer], or natural factors.  7. It also looks like Dr. Allen had planned to do a home sleep study, so we will  expedite that by ordering it today.  8. Discussed other foods that can be helpful such as apple cider vinegar 1 tablespoon in 8 ounces once a day before meal, which has been shown to reduce carbohydrate absorption by at least 30%.  Also hibiscus tea 1 cup twice a day which has been shown to reduce blood pressure equivalent of an ACE inhibitor.  9. She should add more protein to her vegan only diet at this point, using [place] hemp sources of protein, 20 g a day in a smoothie.  We also discussed other alternative grains such as flaxseed and quinoa which would be helpful.  10. Spent some time discussing the fasting mimicking diet which is vegan based, and combined with a Mediterranean style of eating, by Dr. Jarret Khanna.  This may appeal to her since she enjoys prolonged periods of water fasting, but it may be safer a more scientifically sound.  11. Plan is to have a follow-up appointment after we get all her lab tests back.    RTO:   [] 1 mo

## 2020-09-11 ENCOUNTER — TRANSCRIBE ORDERS (OUTPATIENT)
Dept: SCHEDULING | Age: 65
End: 2020-09-11

## 2020-09-11 ENCOUNTER — OFFICE VISIT (OUTPATIENT)
Dept: PRIMARY CARE | Facility: CLINIC | Age: 65
End: 2020-09-11
Payer: COMMERCIAL

## 2020-09-11 VITALS
TEMPERATURE: 97.8 F | OXYGEN SATURATION: 99 % | SYSTOLIC BLOOD PRESSURE: 144 MMHG | HEIGHT: 66 IN | WEIGHT: 175 LBS | BODY MASS INDEX: 28.12 KG/M2 | HEART RATE: 70 BPM | RESPIRATION RATE: 16 BRPM | DIASTOLIC BLOOD PRESSURE: 80 MMHG

## 2020-09-11 DIAGNOSIS — J44.9 CHRONIC OBSTRUCTIVE PULMONARY DISEASE, UNSPECIFIED COPD TYPE (CMS/HCC): ICD-10-CM

## 2020-09-11 DIAGNOSIS — M54.41 CHRONIC BILATERAL LOW BACK PAIN WITH BILATERAL SCIATICA: ICD-10-CM

## 2020-09-11 DIAGNOSIS — M54.42 CHRONIC BILATERAL LOW BACK PAIN WITH BILATERAL SCIATICA: ICD-10-CM

## 2020-09-11 DIAGNOSIS — Z13.1 SCREENING FOR DIABETES MELLITUS (DM): ICD-10-CM

## 2020-09-11 DIAGNOSIS — G89.29 CHRONIC BILATERAL LOW BACK PAIN WITH BILATERAL SCIATICA: ICD-10-CM

## 2020-09-11 DIAGNOSIS — Z82.49 FAMILY HISTORY OF ISCHEMIC HEART DISEASE AND OTHER DISEASES OF THE CIRCULATORY SYSTEM: Primary | ICD-10-CM

## 2020-09-11 DIAGNOSIS — I10 ESSENTIAL HYPERTENSION: ICD-10-CM

## 2020-09-11 DIAGNOSIS — E78.5 HYPERLIPIDEMIA, UNSPECIFIED: ICD-10-CM

## 2020-09-11 DIAGNOSIS — I10 ESSENTIAL (PRIMARY) HYPERTENSION: ICD-10-CM

## 2020-09-11 DIAGNOSIS — Z13.220 SCREENING FOR LIPID DISORDERS: ICD-10-CM

## 2020-09-11 PROCEDURE — 99214 OFFICE O/P EST MOD 30 MIN: CPT | Performed by: FAMILY MEDICINE

## 2020-09-11 ASSESSMENT — ENCOUNTER SYMPTOMS
MUSCULOSKELETAL NEGATIVE: 1
PSYCHIATRIC NEGATIVE: 1
ROS GI COMMENTS: GERD
CONSTITUTIONAL NEGATIVE: 1
ENDOCRINE NEGATIVE: 1
CARDIOVASCULAR NEGATIVE: 1
NEUROLOGICAL NEGATIVE: 1
HEMATOLOGIC/LYMPHATIC NEGATIVE: 1
RESPIRATORY NEGATIVE: 1
EYES NEGATIVE: 1

## 2020-09-11 NOTE — PROGRESS NOTES
Subjective      Patient ID: Adeola Rees is a 64 y.o. female.  1955      HPI Leg Problem (pt c/o leg issues while exersicing. would like script to get bloodwork.   Has sleep apnea. Has hypertension also.  Concerned about cad.                                                                                                                                                                                                                                                                                                                                                                                                                                                                                                                                                                               The following have been reviewed and updated as appropriate in this visit:       Review of Systems   Constitutional: Negative.    HENT: Negative.    Eyes: Negative.    Respiratory: Negative.    Cardiovascular: Negative.    Gastrointestinal:        Gerd   Endocrine: Negative.    Musculoskeletal: Negative.    Skin: Negative.    Neurological: Negative.    Hematological: Negative.    Psychiatric/Behavioral: Negative.        Active Ambulatory Problems     Diagnosis Date Noted   • Left leg pain 03/13/2018   • Left leg numbness 04/17/2018   • Bite, insect 07/10/2018   • Chronic constipation 02/14/2019   • Abdominal bloating 02/14/2019   • Fecal impaction (CMS/[facility]) 02/14/2019   • Mental status change resolved 04/09/2019   • Headache, chronic daily 04/09/2019   • Memory loss 04/09/2019   • Well adult health check 11/05/2019   • Fatty liver 11/05/2019   • Screening for lipid disorders 11/05/2019   • Screening for diabetes mellitus (DM) 11/05/2019   • COPD (chronic obstructive pulmonary disease) (CMS/[facility]) 09/11/2020   • Hypertension 09/11/2020   • Chronic bilateral low back pain with bilateral sciatica 09/11/2020     Resolved Ambulatory Problems      Diagnosis Date Noted   • No Resolved Ambulatory Problems     Past Medical History:   Diagnosis Date   • Celiac disease    • Chemical sensitivity    • Depression    • Environmental allergies    • Female infertility    • H/O gastroesophageal reflux (GERD)    • High cholesterol    • History of bronchitis    • History of sinusitis    • History of skin cancer    • History of traumatic head injury    • Nasal sinus polyp    • Osteoarthritis    • Osteopenia    • Psoriasis    • Sleep apnea        Current Outpatient Medications:   •  ascorbic acid (VITAMIN C ORAL), Take 1,000 mg by mouth daily. Clinical Data, Disp: , Rfl:   •  CALCIUM ORAL, Take by mouth. Natural Vitality Calm and Calcium 1 tsp BID, Disp: , Rfl:   •  cholecalciferol, vitD3,/vit K2 (VITAMIN D3-VITAMIN K2 ORAL), Take by mouth. [employer] shoppe 2500 units, Disp: , Rfl:   •  cyanocobalamin, vitamin B-12, (VITAMIN B-12 ORAL), Take 5,000 mcg by mouth. Vitamin shoppe, Disp: , Rfl:   •  gentamicin (GARAMYCIN) 0.3 % ophthalmic solution, NOT TAKING , Disp: , Rfl: 0  •  ginkgo biloba 40 mg tablet, Take 460 mg by mouth. NOT TAKING , Disp: , Rfl:   •  HERBAL DRUGS ORAL, Take by mouth. [place] 2 oz, Disp: , Rfl:   •  MULTIVIT,MIN52-FOLIC-VITK-CQ10 ORAL, Take by mouth., Disp: , Rfl:   •  naproxen (NAPROSYN) 500 mg tablet, Take 500 mg by mouth as needed.  , Disp: , Rfl:   •  nicotine polacrilex (COMMIT) 4 mg lozenge, Apply 4 mg to cheek every 2 (two) hours as needed for smoking cessation., Disp: , Rfl:   •  RESVERATROL ORAL, Take by mouth. Trunature, 75 mg, Disp: , Rfl:   •  tazarotene (AVAGE) 0.1 % cream, NOT TAKING , Disp: , Rfl:   •  tretinoin (RETIN-A TOP), Apply topically., Disp: , Rfl:   •  TURMERIC ORAL, Take by mouth. Qunol Turmeric 1500 mg, BID, Disp: , Rfl:   Allergies   Allergen Reactions   • Bee Sting [Bee Venom Protein (Honey Bee)] Anaphylaxis   • Honey    • Penicillin G Anaphylaxis   • Penicillins Anaphylaxis     Other reaction(s): Anaphylaxis   •  Sulfa (Sulfonamide Antibiotics) Anaphylaxis   • Ceftin [Cefuroxime Axetil] Other (see comments)     Per patient bone pain   • Ciprofibrate    • Ciprofloxacin      Other reaction(s): Muscular pain   • Shellfish Derived Hives     Social History     Socioeconomic History   • Marital status:      Spouse name: None   • Number of children: None   • Years of education: None   • Highest education level: None   Occupational History   • None   Social Needs   • Financial resource strain: None   • Food insecurity:     Worry: None     Inability: None   • Transportation needs:     Medical: None     Non-medical: None   Tobacco Use   • Smoking status: Former Smoker     Packs/day: 1.00     Years: 48.00     Pack years: 48.00     Types: Cigarettes     Start date: 2/5/1968     Last attempt to quit: 12/5/2016     Years since quitting: 3.7   • Smokeless tobacco: Current User   • Tobacco comment: Uses lozenges   Substance and Sexual Activity   • Alcohol use: No   • Drug use: Not Currently   • Sexual activity: None   Lifestyle   • Physical activity:     Days per week: None     Minutes per session: None   • Stress: None   Relationships   • Social connections:     Talks on phone: None     Gets together: None     Attends Christianity service: None     Active member of club or organization: None     Attends meetings of clubs or organizations: None     Relationship status: None   • Intimate partner violence:     Fear of current or ex partner: None     Emotionally abused: None     Physically abused: None     Forced sexual activity: None   Other Topics Concern   • None   Social History Narrative   • None     Family History   Problem Relation Age of Onset   • Hypertension Biological Mother    • Obesity Biological Mother    • Kidney disease Biological Mother    • Hypertension Biological Father    • Obesity Biological Father    • Heart disease Biological Father    • Diabetes Biological Father    • Arthritis Biological Father    • Thyroid cancer  "Biological Sister    • Cancer Biological Sister    • ADD / ADHD Biological Sister    • Hypertension Biological Brother    • Eczema Biological Brother    • Heart disease Maternal Grandmother    • Hypertension Maternal Grandmother    • Obesity Maternal Grandmother    • Depression Maternal Grandmother    • Hypertension Maternal Grandfather    • Heart disease Maternal Grandfather    • Stroke Maternal Grandfather    • Dementia Paternal Grandmother    • Diabetes Paternal Grandfather    • Cancer Paternal Grandfather        Past Surgical History:   Procedure Laterality Date   • APPENDECTOMY     • APPENDECTOMY  1970   • COLONOSCOPY  07/2019   • HYSTERECTOMY  1995   • OOPHORECTOMY     • TONSILLECTOMY     • TONSILLECTOMY  1964   • UPPER GASTROINTESTINAL ENDOSCOPY  07/2019   • WISDOM TOOTH EXTRACTION  1976       Objective     Vitals:    09/11/20 1404   BP: (!) 144/80   Pulse: 70   Resp: 16   Temp: 36.6 °C (97.8 °F)   SpO2: 99%   Weight: 79.4 kg (175 lb)   Height: 1.676 m (5' 6\")     Body mass index is 28.25 kg/m².    Physical Exam   Constitutional: She is oriented to person, place, and time. She appears well-developed and well-nourished.   HENT:   Head: Normocephalic and atraumatic.   Eyes: Pupils are equal, round, and reactive to light. EOM are normal.   Neck: Normal range of motion.   Cardiovascular: Normal rate and regular rhythm.   Pulmonary/Chest: Effort normal.   Abdominal: Soft.   Musculoskeletal: Normal range of motion.   Neurological: She is alert and oriented to person, place, and time.   Skin: Skin is warm.   Nursing note and vitals reviewed.      Assessment/Plan   Diagnoses and all orders for this visit:    Chronic obstructive pulmonary disease, unspecified COPD type (CMS/HCC)  Assessment & Plan:  stable    Orders:  -     CBC and Differential; Future  -     Comprehensive metabolic panel; Future    Essential hypertension  Assessment & Plan:  Stable.    Orders:  -     CBC and Differential; Future  -     Comprehensive " metabolic panel; Future    Chronic bilateral low back pain with bilateral sciatica  -     CBC and Differential; Future  -     Comprehensive metabolic panel; Future    Screening for lipid disorders  -     CBC and Differential; Future  -     Comprehensive metabolic panel; Future  -     Lipid panel; Future    Screening for diabetes mellitus (DM)  -     CBC and Differential; Future  -     Comprehensive metabolic panel; Future

## 2020-09-12 ENCOUNTER — APPOINTMENT (OUTPATIENT)
Dept: LAB | Age: 65
End: 2020-09-12
Attending: FAMILY MEDICINE
Payer: COMMERCIAL

## 2020-09-12 DIAGNOSIS — J44.9 CHRONIC OBSTRUCTIVE PULMONARY DISEASE, UNSPECIFIED COPD TYPE (CMS/HCC): ICD-10-CM

## 2020-09-12 DIAGNOSIS — Z13.220 SCREENING FOR LIPID DISORDERS: ICD-10-CM

## 2020-09-12 DIAGNOSIS — Z13.1 SCREENING FOR DIABETES MELLITUS (DM): ICD-10-CM

## 2020-09-12 DIAGNOSIS — I10 ESSENTIAL HYPERTENSION: ICD-10-CM

## 2020-09-12 DIAGNOSIS — M54.42 CHRONIC BILATERAL LOW BACK PAIN WITH BILATERAL SCIATICA: ICD-10-CM

## 2020-09-12 DIAGNOSIS — G89.29 CHRONIC BILATERAL LOW BACK PAIN WITH BILATERAL SCIATICA: ICD-10-CM

## 2020-09-12 DIAGNOSIS — M54.41 CHRONIC BILATERAL LOW BACK PAIN WITH BILATERAL SCIATICA: ICD-10-CM

## 2020-09-12 LAB
ALBUMIN SERPL-MCNC: 4.1 G/DL (ref 3.4–5)
ALP SERPL-CCNC: 69 IU/L (ref 35–126)
ALT SERPL-CCNC: 28 IU/L (ref 11–54)
ANION GAP SERPL CALC-SCNC: 8 MEQ/L (ref 3–15)
AST SERPL-CCNC: 26 IU/L (ref 15–41)
BASOPHILS # BLD: 0.04 K/UL (ref 0.01–0.1)
BASOPHILS NFR BLD: 0.9 %
BILIRUB SERPL-MCNC: 1 MG/DL (ref 0.3–1.2)
BUN SERPL-MCNC: 8 MG/DL (ref 8–20)
CALCIUM SERPL-MCNC: 9.5 MG/DL (ref 8.9–10.3)
CHLORIDE SERPL-SCNC: 106 MEQ/L (ref 98–109)
CHOLEST SERPL-MCNC: 223 MG/DL
CO2 SERPL-SCNC: 27 MEQ/L (ref 22–32)
CREAT SERPL-MCNC: 0.7 MG/DL (ref 0.6–1.1)
DIFFERENTIAL METHOD BLD: ABNORMAL
EOSINOPHIL # BLD: 0.24 K/UL (ref 0.04–0.36)
EOSINOPHIL NFR BLD: 5.2 %
ERYTHROCYTE [DISTWIDTH] IN BLOOD BY AUTOMATED COUNT: 12.1 % (ref 11.7–14.4)
GFR SERPL CREATININE-BSD FRML MDRD: >60 ML/MIN/1.73M*2
GLUCOSE SERPL-MCNC: 85 MG/DL (ref 70–99)
HCT VFR BLDCO AUTO: 46.1 % (ref 35–45)
HDLC SERPL-MCNC: 47 MG/DL
HDLC SERPL: 4.7 {RATIO}
HGB BLD-MCNC: 15 G/DL (ref 11.8–15.7)
IMM GRANULOCYTES # BLD AUTO: 0.01 K/UL (ref 0–0.08)
IMM GRANULOCYTES NFR BLD AUTO: 0.2 %
LDLC SERPL CALC-MCNC: 152 MG/DL
LYMPHOCYTES # BLD: 1.4 K/UL (ref 1.2–3.5)
LYMPHOCYTES NFR BLD: 30.2 %
MCH RBC QN AUTO: 31 PG (ref 28–33.2)
MCHC RBC AUTO-ENTMCNC: 32.5 G/DL (ref 32.2–35.5)
MCV RBC AUTO: 95.2 FL (ref 83–98)
MONOCYTES # BLD: 0.48 K/UL (ref 0.28–0.8)
MONOCYTES NFR BLD: 10.3 %
NEUTROPHILS # BLD: 2.47 K/UL (ref 1.7–7)
NEUTS SEG NFR BLD: 53.2 %
NONHDLC SERPL-MCNC: 176 MG/DL
NRBC BLD-RTO: 0 %
PDW BLD AUTO: 10.1 FL (ref 9.4–12.3)
PLATELET # BLD AUTO: 247 K/UL (ref 150–369)
POTASSIUM SERPL-SCNC: 5.1 MEQ/L (ref 3.6–5.1)
PROT SERPL-MCNC: 6.5 G/DL (ref 6–8.2)
RBC # BLD AUTO: 4.84 M/UL (ref 3.93–5.22)
SODIUM SERPL-SCNC: 141 MEQ/L (ref 136–144)
TRIGL SERPL-MCNC: 118 MG/DL (ref 30–149)
WBC # BLD AUTO: 4.64 K/UL (ref 3.8–10.5)

## 2020-09-12 PROCEDURE — 82040 ASSAY OF SERUM ALBUMIN: CPT

## 2020-09-12 PROCEDURE — 85025 COMPLETE CBC W/AUTO DIFF WBC: CPT

## 2020-09-12 PROCEDURE — 36415 COLL VENOUS BLD VENIPUNCTURE: CPT

## 2020-09-12 PROCEDURE — 80061 LIPID PANEL: CPT

## 2020-09-14 ENCOUNTER — HOSPITAL ENCOUNTER (OUTPATIENT)
Dept: RADIOLOGY | Age: 65
Discharge: HOME | End: 2020-09-14
Attending: FAMILY MEDICINE

## 2020-09-14 DIAGNOSIS — Z82.49 FAMILY HISTORY OF ISCHEMIC HEART DISEASE AND OTHER DISEASES OF THE CIRCULATORY SYSTEM: ICD-10-CM

## 2020-09-14 DIAGNOSIS — E78.5 HYPERLIPIDEMIA, UNSPECIFIED: ICD-10-CM

## 2020-09-14 DIAGNOSIS — I10 ESSENTIAL (PRIMARY) HYPERTENSION: ICD-10-CM

## 2020-09-14 PROCEDURE — 75571 CT HRT W/O DYE W/CA TEST: CPT

## 2020-09-15 ENCOUNTER — OFFICE VISIT (OUTPATIENT)
Dept: INTEGRATIVE MEDICINE | Age: 65
End: 2020-09-15

## 2020-09-15 ENCOUNTER — TELEPHONE (OUTPATIENT)
Dept: INTEGRATIVE MEDICINE | Age: 65
End: 2020-09-15

## 2020-09-15 VITALS
WEIGHT: 172.6 LBS | SYSTOLIC BLOOD PRESSURE: 122 MMHG | TEMPERATURE: 98 F | BODY MASS INDEX: 28.76 KG/M2 | DIASTOLIC BLOOD PRESSURE: 86 MMHG | HEIGHT: 65 IN | OXYGEN SATURATION: 98 % | HEART RATE: 73 BPM | RESPIRATION RATE: 16 BRPM

## 2020-09-15 DIAGNOSIS — R14.0 ABDOMINAL BLOATING: Primary | ICD-10-CM

## 2020-09-15 DIAGNOSIS — R41.3 MEMORY LOSS: ICD-10-CM

## 2020-09-15 DIAGNOSIS — E83.2 DISORDER OF ZINC METABOLISM: ICD-10-CM

## 2020-09-15 DIAGNOSIS — Z77.120 EXPOSURE TO MOLD: ICD-10-CM

## 2020-09-15 DIAGNOSIS — K76.0 FATTY LIVER: ICD-10-CM

## 2020-09-15 DIAGNOSIS — G31.84 MCI (MILD COGNITIVE IMPAIRMENT) WITH MEMORY LOSS: ICD-10-CM

## 2020-09-15 DIAGNOSIS — E03.8 SUBCLINICAL HYPOTHYROIDISM: ICD-10-CM

## 2020-09-15 DIAGNOSIS — R20.0 LEFT LEG NUMBNESS: ICD-10-CM

## 2020-09-15 DIAGNOSIS — M79.10 MUSCLE PAIN: ICD-10-CM

## 2020-09-15 DIAGNOSIS — M85.88 OSTEOPENIA OF LUMBAR SPINE: ICD-10-CM

## 2020-09-15 DIAGNOSIS — R79.83 HOMOCYSTINEMIA: ICD-10-CM

## 2020-09-15 DIAGNOSIS — E55.9 VITAMIN D DEFICIENCY DISEASE: ICD-10-CM

## 2020-09-15 DIAGNOSIS — M25.552 PAIN OF LEFT HIP JOINT: ICD-10-CM

## 2020-09-15 DIAGNOSIS — D52.0 DIETARY FOLATE DEFICIENCY ANEMIA: ICD-10-CM

## 2020-09-15 DIAGNOSIS — D51.3 OTHER DIETARY VITAMIN B12 DEFICIENCY ANEMIA: ICD-10-CM

## 2020-09-15 DIAGNOSIS — J30.5 ALLERGIC RHINITIS DUE TO FOOD: ICD-10-CM

## 2020-09-15 DIAGNOSIS — G90.09 NEUROPATHY, PERIPHERAL, AUTONOMIC, IDIOPATHIC: ICD-10-CM

## 2020-09-15 PROCEDURE — INTG100 PR NEW COMP HEALTH ASSESSMENT: Performed by: FAMILY MEDICINE

## 2020-09-15 PROCEDURE — INTG119 PR 30 MINUTE EXTENDED VISIT INTEGRATIVE MED: Performed by: FAMILY MEDICINE

## 2020-09-15 NOTE — PATIENT INSTRUCTIONS
1. Flaxseed ground or whole, keep in the fridge  2. Quinoa as a grain substitute (black, white, red)  3. Protein powder like pea or hemp, 20 grams a day.    4. Fasting Mimicking Diet, the Longevity Diet, Dr. Elmer Khanna   5. Obtain labs from [facility], slip given today.   6. After the SIBO breath test, do Berberine 500 mg twice a day, for liver and cholesterol. products from Dr. Parkinson, [employer], or [employer], on amaz[employer].  7. Apple cider vinegar, 1 Tbsp in 8 oz of water, with lemon and drink before a meal once a day.   8. Hibiscus tea one cup twice a day  9. Sleep study ordered for home

## 2020-09-17 ENCOUNTER — APPOINTMENT (OUTPATIENT)
Dept: LAB | Age: 65
End: 2020-09-17
Attending: FAMILY MEDICINE
Payer: COMMERCIAL

## 2020-09-17 DIAGNOSIS — G31.84 MCI (MILD COGNITIVE IMPAIRMENT) WITH MEMORY LOSS: ICD-10-CM

## 2020-09-17 DIAGNOSIS — G90.09 NEUROPATHY, PERIPHERAL, AUTONOMIC, IDIOPATHIC: ICD-10-CM

## 2020-09-17 DIAGNOSIS — R14.0 ABDOMINAL BLOATING: ICD-10-CM

## 2020-09-17 DIAGNOSIS — E83.2 DISORDER OF ZINC METABOLISM: ICD-10-CM

## 2020-09-17 DIAGNOSIS — Z77.120 EXPOSURE TO MOLD: ICD-10-CM

## 2020-09-17 DIAGNOSIS — R20.0 LEFT LEG NUMBNESS: ICD-10-CM

## 2020-09-17 DIAGNOSIS — D51.3 OTHER DIETARY VITAMIN B12 DEFICIENCY ANEMIA: ICD-10-CM

## 2020-09-17 DIAGNOSIS — J30.5 ALLERGIC RHINITIS DUE TO FOOD: ICD-10-CM

## 2020-09-17 DIAGNOSIS — R79.83 HOMOCYSTINEMIA: ICD-10-CM

## 2020-09-17 DIAGNOSIS — E03.8 SUBCLINICAL HYPOTHYROIDISM: ICD-10-CM

## 2020-09-17 DIAGNOSIS — M25.552 PAIN OF LEFT HIP JOINT: ICD-10-CM

## 2020-09-17 DIAGNOSIS — M79.10 MUSCLE PAIN: ICD-10-CM

## 2020-09-17 DIAGNOSIS — R41.3 MEMORY LOSS: ICD-10-CM

## 2020-09-17 DIAGNOSIS — K76.0 FATTY LIVER: ICD-10-CM

## 2020-09-17 DIAGNOSIS — M85.88 OSTEOPENIA OF LUMBAR SPINE: ICD-10-CM

## 2020-09-17 DIAGNOSIS — D52.0 DIETARY FOLATE DEFICIENCY ANEMIA: ICD-10-CM

## 2020-09-17 DIAGNOSIS — E55.9 VITAMIN D DEFICIENCY DISEASE: ICD-10-CM

## 2020-09-17 LAB
25(OH)D3 SERPL-MCNC: 21 NG/ML (ref 30–100)
FERRITIN SERPL-MCNC: 129 NG/ML (ref 11–250)
FOLATE SERPL-MCNC: 14.4 NG/ML
HCYS SERPL-SCNC: 9.2 UMOL/L (ref 4.5–15)
T3FREE SERPL-MCNC: 3.3 PG/ML (ref 2.1–3.7)
T4 FREE SERPL-MCNC: 0.81 NG/DL (ref 0.58–1.64)
THYROPEROXIDASE AB SERPL-ACNC: <0.3 IU/ML
TSH SERPL DL<=0.05 MIU/L-ACNC: 1.67 MIU/L (ref 0.34–5.6)
VIT B12 SERPL-MCNC: 402 PG/ML (ref 180–914)

## 2020-09-17 PROCEDURE — 86003 ALLG SPEC IGE CRUDE XTRC EA: CPT

## 2020-09-17 PROCEDURE — 82306 VITAMIN D 25 HYDROXY: CPT

## 2020-09-17 PROCEDURE — 84481 FREE ASSAY (FT-3): CPT

## 2020-09-17 PROCEDURE — 82607 VITAMIN B-12: CPT

## 2020-09-17 PROCEDURE — 84443 ASSAY THYROID STIM HORMONE: CPT

## 2020-09-17 PROCEDURE — 82746 ASSAY OF FOLIC ACID SERUM: CPT

## 2020-09-17 PROCEDURE — 36415 COLL VENOUS BLD VENIPUNCTURE: CPT

## 2020-09-17 PROCEDURE — 83825 ASSAY OF MERCURY: CPT

## 2020-09-17 PROCEDURE — 82525 ASSAY OF COPPER: CPT

## 2020-09-17 PROCEDURE — 86038 ANTINUCLEAR ANTIBODIES: CPT

## 2020-09-17 PROCEDURE — 84439 ASSAY OF FREE THYROXINE: CPT

## 2020-09-17 PROCEDURE — 84630 ASSAY OF ZINC: CPT

## 2020-09-17 PROCEDURE — 82728 ASSAY OF FERRITIN: CPT

## 2020-09-17 PROCEDURE — 84255 ASSAY OF SELENIUM: CPT

## 2020-09-17 PROCEDURE — 83090 ASSAY OF HOMOCYSTEINE: CPT

## 2020-09-17 PROCEDURE — 83520 IMMUNOASSAY QUANT NOS NONAB: CPT

## 2020-09-17 PROCEDURE — 86376 MICROSOMAL ANTIBODY EACH: CPT

## 2020-09-17 PROCEDURE — 30000001 MISCELLANEOUS LAB TEST (NOT TO BE USED FOR COVID19)

## 2020-09-17 PROCEDURE — 83655 ASSAY OF LEAD: CPT

## 2020-09-18 LAB
ANA SER QL IA: NEGATIVE
QST LEAD, BLOOD: 1 MCG/DL

## 2020-09-21 LAB
ALMOND IGE QN: <0.35 KU/L
CASHEW NUT IGE QN: <0.35 KU/L
CLAM IGE QN: <0.35 KU/L
CODFISH IGE QN: <0.35 KU/L
COPPER SERPL-MCNC: 105 MCG/DL (ref 70–175)
EGG WHITE IGE QN: <0.35
GLUTEN IGE QN: <0.35 KU/L
GOAT MILK IGE QN: <0.35
HAZELNUT IGE QN: <0.35 KU/L
MERCURY BLD-MCNC: <4 MCG/L
PEANUT IGE QN: <0.1 KU/L
SALMON IGE QN: <0.35 KU/L
SCALLOP IGE QN: <0.35 KU/L
SELENIUM SERPL-MCNC: 132 MCG/L (ref 63–160)
SESAME SEED IGE QN: <0.35 KU/L
SHRIMP IGE QN: <0.35 KU/L
SOYBEAN DUST IGE QN: <0.35 KU/L
TUNA IGE QN: <0.35 KU/L
WALNUT IGE QN: <0.35 KU/L
WHEAT IGE QN: <0.35 KU/L
ZINC SERPL-MCNC: 82 MCG/DL (ref 60–130)

## 2020-09-23 ENCOUNTER — RX ONLY (RX ONLY)
Age: 65
End: 2020-09-23

## 2020-09-23 ENCOUNTER — APPOINTMENT (OUTPATIENT)
Dept: URBAN - METROPOLITAN AREA CLINIC 200 | Age: 65
Setting detail: DERMATOLOGY
End: 2020-10-04

## 2020-09-23 DIAGNOSIS — L82.1 OTHER SEBORRHEIC KERATOSIS: ICD-10-CM

## 2020-09-23 DIAGNOSIS — Z85.828 PERSONAL HISTORY OF OTHER MALIGNANT NEOPLASM OF SKIN: ICD-10-CM

## 2020-09-23 DIAGNOSIS — L57.8 OTHER SKIN CHANGES DUE TO CHRONIC EXPOSURE TO NONIONIZING RADIATION: ICD-10-CM

## 2020-09-23 PROCEDURE — OTHER LIQUID NITROGEN (COSMETIC): OTHER

## 2020-09-23 PROCEDURE — 99213 OFFICE O/P EST LOW 20 MIN: CPT

## 2020-09-23 PROCEDURE — OTHER COUNSELING: OTHER

## 2020-09-23 RX ORDER — TAZAROTENE 0.1 MG/G
CREAM CUTANEOUS
Qty: 1 | Refills: 6 | Status: ERX | COMMUNITY
Start: 2020-09-23

## 2020-09-23 RX ORDER — SODIUM SULFACETAMIDE AND SULFUR 80; 40 MG/ML; MG/ML
LOTION TOPICAL
Qty: 1 | Refills: 6 | Status: ERX | COMMUNITY
Start: 2020-09-23

## 2020-09-23 ASSESSMENT — LOCATION DETAILED DESCRIPTION DERM
LOCATION DETAILED: LEFT FOREHEAD
LOCATION DETAILED: LEFT MEDIAL SUPERIOR CHEST
LOCATION DETAILED: RIGHT CENTRAL ZYGOMA
LOCATION DETAILED: LEFT INFERIOR ANTERIOR NECK
LOCATION DETAILED: RIGHT CENTRAL TEMPLE

## 2020-09-23 ASSESSMENT — LOCATION ZONE DERM
LOCATION ZONE: FACE
LOCATION ZONE: NECK
LOCATION ZONE: TRUNK

## 2020-09-23 ASSESSMENT — LOCATION SIMPLE DESCRIPTION DERM
LOCATION SIMPLE: LEFT ANTERIOR NECK
LOCATION SIMPLE: LEFT FOREHEAD
LOCATION SIMPLE: CHEST
LOCATION SIMPLE: RIGHT ZYGOMA
LOCATION SIMPLE: RIGHT TEMPLE

## 2020-09-23 NOTE — PROCEDURE: LIQUID NITROGEN (COSMETIC)
Consent: The patient's consent was obtained including but not limited to risks of crusting, scabbing, blistering, scarring, darker or lighter pigmentary change, recurrence, incomplete removal and infection. The patient understands that the procedure is cosmetic in nature and is not covered by insurance.
Render Post-Care Instructions In Note?: no
Price (Use Numbers Only, No Special Characters Or $): 50.00
Billing Information: Bill by Static Price
Post-Care Instructions: I reviewed with the patient in detail post-care instructions. Patient is to wear sunprotection, and avoid picking at any of the treated lesions. Pt may apply Vaseline to crusted or scabbing areas.
Detail Level: Zone

## 2020-09-28 LAB — LABORATORY REPORT: NORMAL

## 2020-09-30 ENCOUNTER — TELEPHONE (OUTPATIENT)
Dept: INTEGRATIVE MEDICINE | Age: 65
End: 2020-09-30

## 2020-09-30 NOTE — TELEPHONE ENCOUNTER
Spoke with mario she is aware that the sleep study came back as only mild sleep apnea and is not probably not significant.

## 2020-10-07 ENCOUNTER — TELEPHONE (OUTPATIENT)
Dept: INTEGRATIVE MEDICINE | Age: 65
End: 2020-10-07

## 2020-10-12 ENCOUNTER — DOCUMENTATION (OUTPATIENT)
Dept: PRIMARY CARE | Facility: CLINIC | Age: 65
End: 2020-10-12

## 2020-10-12 NOTE — PROGRESS NOTES
Karen Kraft MA has completed a chart review for Adeola Rees and have determined that the care gap has been satisfied.    Care Gap Source:: Daniel Camejo    Care Gap(s) Identified:: Cervical Cancer Screening    Chart Review Completed:: Yes      Upon review of patients chart it is noted under surgical history that patient had a hysterectomy in 1995.

## 2020-10-14 NOTE — TELEPHONE ENCOUNTER
Spoke with Adeola, she is aware SIBO is positive.  She said she is nauseous every time she eats, is there anything she can do in the meantime?  Her appointment is 10/29 and I added her to the waitlist

## 2020-10-14 NOTE — TELEPHONE ENCOUNTER
Start taking brooke in capsules by [employer] [address] 500 mg one 20 minutes or so before meals three times a day to see if that reduces the nausea.  Can take two at a time if needed as well.     Dr. LEBRON

## 2020-10-21 NOTE — PROGRESS NOTES
[facility] Follow Up Visit    Date: 10/28/2020    Name: Adeola Rees    : 1955    Reason for visit:     Allergies: Bee sting [bee venom protein (honey bee)], Honey, Penicillin g, Penicillins, Sulfa (sulfonamide antibiotics), Ceftin [cefuroxime axetil], Ciprofibrate, Ciprofloxacin, and Shellfish derived    Medications:   Current Outpatient Medications   Medication Sig Dispense Refill   • ascorbic acid (VITAMIN C ORAL) Take 1,000 mg by mouth daily. [facility] brand     • CALCIUM ORAL Take by mouth. Natural Vitality Calm and Calcium 1 tsp BID     • cholecalciferol, vitD3,/vit K2 (VITAMIN D3-VITAMIN K2 ORAL) Take by mouth. Vitamin shoppe 2500 units     • cyanocobalamin, vitamin B-12, (VITAMIN B-12 ORAL) Take 5,000 mcg by mouth. Vitamin shoppe     • ginger root (GINGER EXTRACT ORAL) Take 500 mg by mouth. Before meals     • HERBAL DRUGS ORAL Take by mouth. Berberine 500 mg BID     • HERBAL DRUGS ORAL Take by mouth. Wormwood black walnut. MEME. 30 drops      • nicotine polacrilex (COMMIT) 4 mg lozenge Apply 4 mg to cheek every 2 (two) hours as needed for smoking cessation.     • OREGANO OIL ORAL Take by mouth.     • RESVERATROL ORAL Take by mouth. Trunature, 75 mg     • tretinoin (RETIN-A TOP) Apply topically.       No current facility-administered medications for this visit.        Supplements: [ ]    Family History:   Family History   Problem Relation Age of Onset   • Hypertension Biological Mother    • Obesity Biological Mother    • Kidney disease Biological Mother    • Hypertension Biological Father    • Obesity Biological Father    • Heart disease Biological Father    • Diabetes Biological Father    • Arthritis Biological Father    • Thyroid cancer Biological Sister    • Cancer Biological Sister    • ADD / ADHD Biological Sister    • Hypertension Biological Brother    • Eczema Biological Brother    • Heart disease Maternal Grandmother    • Hypertension Maternal  Grandmother    • Obesity Maternal Grandmother    • Depression Maternal Grandmother    • Hypertension Maternal Grandfather    • Heart disease Maternal Grandfather    • Stroke Maternal Grandfather    • Dementia Paternal Grandmother    • Diabetes Paternal Grandfather    • Cancer Paternal Grandfather          Social History:   Social History     Socioeconomic History   • Marital status:      Spouse name: None   • Number of children: None   • Years of education: None   • Highest education level: None   Occupational History   • None   Social Needs   • Financial resource strain: None   • Food insecurity     Worry: None     Inability: None   • Transportation needs     Medical: None     Non-medical: None   Tobacco Use   • Smoking status: Former Smoker     Packs/day: 1.00     Years: 48.00     Pack years: 48.00     Types: Cigarettes     Start date: 2/5/1968     Quit date: 12/5/2016     Years since quitting: 3.8   • Smokeless tobacco: Current User   • Tobacco comment: Uses lozenges   Substance and Sexual Activity   • Alcohol use: No   • Drug use: Not Currently   • Sexual activity: None   Lifestyle   • Physical activity     Days per week: None     Minutes per session: None   • Stress: None   Relationships   • Social connections     Talks on phone: None     Gets together: None     Attends Jain service: None     Active member of club or organization: None     Attends meetings of clubs or organizations: None     Relationship status: None   • Intimate partner violence     Fear of current or ex partner: None     Emotionally abused: None     Physically abused: None     Forced sexual activity: None   Other Topics Concern   • None   Social History Narrative   • None        Vitals: There were no vitals taken for this visit.    Weight:       Height:       BMI:There is no height or weight on file to calculate BMI.    GAIL: [ ]    % Body Fat: [ ]    % Muscle Mass: [ ]    Visceral fat:  []    WC: [ ]    NSR:       HPI  For FU  after labs  Lost another 1 lbs (down 11 or so this year)      Some Fatigue with exertion, eating make the nodes seem to swell in the neck.  Under the ear or in the groin.  Burning   On Prolon all week, olives make her puke.    Not doing them more    Vitamins and water. Soups make her burn.    Scale was down to 164, then tried again with modifications.      Freeze   On last day today  Honey seems to tightens her up.  No swelling of the lips.  Eyes sometimes swell with the honey.     Activity less.  Left leg goes out with walking.  Mucles pain?  Rebounder used with different work outs does one workouts 2 - 3 times a day. Yoga, stretching every other day.  15 minutes.  Two classes  Tired afterwards the yoga.      More endurance and breath work.    Meditative things a little less?  Listens to podcasts through the day.      On SIBO forums.   Side preston Low FODMAPS diet.  When not on prolon tried to eat only.    Parasites a year ago, bottom was sore for a while.      SIBO:  10 days wormwood black walnut  Milk thistle  Peppermint spirits not yet  [place] oil capsules 2 a day for two weeks.     No change   Berberine 500 mg stopped that with prolon for 5 days and then restarted.      Antibiotics are not good    Huge scar from the ruptured appendix.  Umbilical hernia  Scars easy.   LINH June removed. Afraid of twisting in the bowels.   Lysius of adhesions in April 166 at home, up all night midnight got fired up.  Hard to relax  Son is in troubles and court involved.  [place] scene of the accident   Only like that occ.  Usually sleeping well.  29 yr old    Has great ashley   Mouth guard lost and to see a dentist to get a new one.     EGD done in the past,  Multiple surgeries.     Skin comes off when showers.  Well water. Reverse osmosis for drinking.     Berberine feels is digesting better.   Stopped the turmeric seems like burping up bile.    Fiona suggested, stopped the aloe and peppermint and stomach formula.  Went back to  the stomach formula aloe vera juiice with brooke.   Twice\  ACV pills    Colon 1 1/2 yr ago  Xmas and Easter gluten    Yellow discharge from the eye, been to eye docs, told was a dry eye and told to use moisturizes.     Bones getting bigger  Elbows,     Freezing at derm around the   Yeast around the torsl with blisters, given the nystatin cream    Consider CSE if you're not making adequate  UV on the furnace.  Low part of the land, basement are moist.  2 dehumidifiers and sump pumps    Welts after shrimp and crabs in the past (not anaphyllaxis)  Redhead in the past.  Hair dye set off eye swelling.     Tradition Hibiscus tea.    Cut out coffee.   Can due on her own    Takes Resveratrol.  Liquid form.    No red wine.     Vitamin C  Magnesium Calm taken. Got constipation on the Prolon.    [place] really likes  Nicotine 4 - 6 a day, indigestion from them    Oregano oil twice a day  Left leg unsteady, left shin very sensitive.    Toes inflamed and itchy and cracked soles.      Review of Systems  No CP, palpitations, dyspnea.  Left elbow < 1 cm flat thickened nodular area. Not psoriasis      Physical Exam  Vitals signs and nursing note reviewed.   Constitutional:       General: She is not in acute distress.     Appearance: She is well-developed.   HENT:      Head: Normocephalic.   Eyes:      Conjunctiva/sclera: Conjunctivae normal.      Comments: Right eye watery   Neck:      Musculoskeletal: Normal range of motion.      Thyroid: No thyromegaly.   Cardiovascular:      Pulses: Normal pulses.   Pulmonary:      Effort: Pulmonary effort is normal.      Breath sounds: Normal breath sounds.   Abdominal:      General: Abdomen is flat. Bowel sounds are normal.      Palpations: Abdomen is soft. There is no mass.      Comments: Vertical scar to the right of midline from appendix.  Small umbilical hernia present   Lymphadenopathy:      Cervical: No cervical adenopathy.   Skin:     General: Skin is warm and dry.      Coloration: Skin is  not pale.      Findings: No rash.   Neurological:      Mental Status: She is alert and oriented to person, place, and time.      Cranial Nerves: No cranial nerve deficit.   Psychiatric:         Behavior: Behavior normal.     Varicose veins without edema, dryness present of the lower legs.      Labs:     MSQ Score: [ ]    ASSESSMENT:  1. Overweight BMI 29  2. Hepatic steatosis on US with normal ALT.  Visceral fat level of 20, PBF 44 on GAIL  3. Hypercholesterolemia, , HDL 47 9/12/2020  4. FHx of CAD and high cholesterol.  CAC just done is ZERO  5. F obese and had DM  6. Hx of elevated BP without HBP on no meds at present.   7. Osteopenia, Hip -1.4 T score  8. Questionable Celiac Disease with flattened villi and lymphocytosis, but not conclusive.  6/2019 bx on EGD and Colon also with bx in Ileum all negative, no H pylori.  Had dyspepsia which cleared when went gluten free however.   9. Snoring, mild fatigue:  Sleep apnea Testing is Negative  10. DANILO, hx of depression, no meds  11. Hx of PCOS and Infertility  12. Stress from two adopted adult children with special needs. One son in court trouble now  13. Ex-smoker on nicotine lozenge 4 a day  14. COPD, mild?  15. RUL stable nodule in lung  16. Menopause at age 39 - 40 with LINH then BSO due to endometriosis/fibroids, Estrogen dominant?  17. Back pain after injured herself trying to move furniture in 2018, left leg weakness since then.  MRI showed L4-5 and L5-S1 disc protrusion and DJD.  Appears to have left hip flexor weakness , Psoas injury?    18. Tenderness over the left shin, related to back, or local problem?  19. TMJ  20. Periodontal disease, root canals, infected?  21. Abdomen bloating, change in bowels off and on, food intolerances;  Multiple antibiotics and Appendix as a child, more antibiotics for acne as a teen, more for sinus infections:  godfrey has altered microbiome/dysbiosis and SIBO Present on Khurram breath test, methane producing .  Began  treating herself with oregano oil and the wormwood black walnut tincture, also peppermint oil and milk thistle.   22. Varicose veins mild  23. Tension headaches, stress/anxiety/sleep?  24. Allergies  25. Rash after fasting?  26. Anaphyllaxis to PCN, Sulfa  27. Hx of HPV   28. Multiple sensitivities to smoke, paint, perfumes, noises, EMF's.  Detox enzyme SNP's?  29. Water damage to home with possible bacterial and mold toxin exposures.   TGFB1 is normal, CIRS less likely  30. Also probable concussion in 2018  31. 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 Insufficient vitamin B12 at 402  33. Vitamin D deficiency at 21      1. The patient reviewed and signed our practice consents at their first visit, including their pledge to obtain primary care services from a primary care physician and that we will not serve the role, and also will not bill insurance, as this is a cash only practice.  We also discussed the nature of an integrative medicine practice, and that we are offering treatments that are complementary or alternative to traditional medical treatments, particularly when those treatments have not been successful in making the patient healthier.  Most patients come to this practice looking for treatments that are less toxic, and are also looking for approaches that are innovative or different from the traditional medical approach, with the hope of finding greater health through these other approaches.  The patient understands that these treatments are often less studied then medications approved by the FDA, and they still might have side effects, that require monitoring and follow-up visits in order to assess the patient's response to any treatments we recommend.  The patient also pledges to advise their primary care physician of any treatment programs we undertake.  Patient is free to call me, or communicate via email through our portal, with any questions about  treatments we propose.      PLAN:   So the patient has suffered from abdominal distress like burning, reflux, bloating and gas with alternating stool patterns for many many years.  There is a possibility of nonceliac gluten intolerance considering the changes to her intestinal villi on EGD that were not completely diagnostic of celiac, and with a negative celiac blood test.  She should remain gluten-free, and we should treat her for methane producing small intestine bacterial overgrowth.  1. [ ] To bowel protocol was put together incorporating some of the things she is already doing, to include oregano oil by Concha, 1 twice a day, to continue the berberine 1 twice a day 500 mg (this will help with insulin resistance, her cholesterol, fatty liver, and her microbial overgrowth).  In addition she will obtain the ultra GI replenish by [employer] to uses a powder for the the first month of this protocol, instead of other protein powders.  Also she should begin Triphala 2 a day, brands given.  She also can use 1 dropper of the wormwood black walnut tincture by Concha twice a day between meals.  2. After 1 month, she should discontinue the oregano oil, continue the berberine.  She can stop the Triphala and the ultra GI replenish, and switch to GI response powder 1 scoop twice a day for 2 weeks then 1 scoop a day, and Visbiome probiotic 2 a day.  She will continue that program for 3 months.  3. She should also continue the low FODMAPs eating plan, and the [facility] phone joan reference was given.  4. She can continue doing the prolonged 5 days of controlled fasting mimicking diet for her third monthly session next month.  5. Because of her unusual reactions to multiple foods, we will try to treat any mast cell activation that is present, beginning with quercetin by either Solaranderson or AMIRAH, 500 mg before each meal 3 times a day.  We will consider increasing that dose depending on how she tolerates it.  6. Encouraged her  increasing physical activity to help with fitness and weight loss.  7. We discussed the best overall diet is a variation of a Mediterranean style, mostly plant-based with nuts and seeds, as well as fish, chicken, turkey, and eggs.  Limit red meat, limit any processed carbohydrates.  8. Discussed her coronary artery risk factors, and tendency to metabolic syndrome, will plan on rechecking cardiac markers, vitamin D, B12, in 3 months.  9. Increase your vitamin B-12 supplement which appears to be 2500 mcg sublingual, to 2 a day, with the goal of increasing her B12 level to between 508 100.  10. Due to significant vitamin D deficiency in a patient with osteopenia, she will begin ergocalciferol vitamin D2 50,000 units, once a week with a fatty meal for 12 weeks, prescription sent in today.  She will then resume a 5000 unit day vitamin D supplementation with vitamin K2 if she wishes.  11. She is going to continue her current magnesium, vitamin C, resveratrol supplements.  12. Also can continue apple cider vinegar to help with decreasing carbohydrate absorption, and the hibiscus tea 1 or 2 cups daily to help with blood pressure.  13. She is going to continue taking her nicotine gum, and trying to minimize the number of pills per day.    RTO:   [ ] 5 to 6 weeks

## 2020-10-29 ENCOUNTER — OFFICE VISIT (OUTPATIENT)
Dept: INTEGRATIVE MEDICINE | Age: 65
End: 2020-10-29

## 2020-10-29 ENCOUNTER — TELEPHONE (OUTPATIENT)
Dept: INTEGRATIVE MEDICINE | Age: 65
End: 2020-10-29

## 2020-10-29 DIAGNOSIS — G31.84 MCI (MILD COGNITIVE IMPAIRMENT) WITH MEMORY LOSS: ICD-10-CM

## 2020-10-29 DIAGNOSIS — K59.09 CHRONIC CONSTIPATION: ICD-10-CM

## 2020-10-29 DIAGNOSIS — R14.0 ABDOMINAL BLOATING: ICD-10-CM

## 2020-10-29 DIAGNOSIS — E03.8 SUBCLINICAL HYPOTHYROIDISM: ICD-10-CM

## 2020-10-29 DIAGNOSIS — K76.0 FATTY LIVER: Primary | ICD-10-CM

## 2020-10-29 DIAGNOSIS — K63.8219 SMALL INTESTINAL BACTERIAL OVERGROWTH: ICD-10-CM

## 2020-10-29 PROCEDURE — INTG101 PR FIRST FOLLOW-UP CONSULT (90 MIN): Performed by: FAMILY MEDICINE

## 2020-10-29 RX ORDER — ERGOCALCIFEROL 1.25 MG/1
50000 CAPSULE ORAL WEEKLY
Qty: 12 CAPSULE | Refills: 0 | Status: SHIPPED | OUTPATIENT
Start: 2020-10-29 | End: 2021-02-26 | Stop reason: SDUPTHER

## 2020-10-29 NOTE — PATIENT INSTRUCTIONS
1. Small Intestine bacterial overgrowth Protocol  2. [facility] phone joan, about IBS and FODMAPS  3. Vitamin D 50,000 units once a week with a meal for 12 weeks, prescription called in.   Stop your oral vitamin D pills for the next 12 weeks, then restart them when the prescription is completed.   4. B12 supplement, take two a day, under the tongue.   5. Mast Cell Activation Syndrome, Quercetin 500 mg one before meals, MRM or Solaray brands  6. Repeat a blood test in 3 months  7. Appointment 5 - 6 weeks

## 2020-11-30 ENCOUNTER — TELEPHONE (OUTPATIENT)
Dept: INTEGRATIVE MEDICINE | Age: 65
End: 2020-11-30

## 2020-12-03 NOTE — PROGRESS NOTES
[facility] Follow Up Visit    Date: 2020    Name: Adeola Rees    : 1955    Reason for visit:     Allergies: Bee sting [bee venom protein (honey bee)], Honey, Penicillin g, Penicillins, Sulfa (sulfonamide antibiotics), Ceftin [cefuroxime axetil], Ciprofibrate, Ciprofloxacin, and Shellfish derived    Medications:   Current Outpatient Medications   Medication Sig Dispense Refill   • Bifidobacterium infantis (DIGESTIVE PROBIOTIC ORAL) Take by mouth. Triphala     • CALCIUM ORAL Take by mouth. Natural Vitality Calm and Calcium 1 tsp BID     • cyanocobalamin, vitamin B-12, (VITAMIN B-12 ORAL) Take 5,000 mcg by mouth. Vitamin shoppe     • ergocalciferol (VITAMIN D2) 50,000 unit(1250 mcg) capsule Take 50,000 Units by mouth once a week. With a meal 12 capsule 0   • HERBAL DRUGS ORAL Take by mouth. Berberine 500 mg BID     • nicotine polacrilex (COMMIT) 4 mg lozenge Apply 4 mg to cheek See admin instr. q 5 hours PRN      • OREGANO OIL ORAL Take by mouth.     • quercetin 500 mg. Solaray     • RESVERATROL ORAL Take by mouth. Trunature, 75 mg     • tretinoin (RETIN-A TOP) Apply topically.     • ascorbic acid (VITAMIN C ORAL) Take 1,000 mg by mouth daily. TripIt brand     • cholecalciferol, vitD3,/vit K2 (VITAMIN D3-VITAMIN K2 ORAL) Take by mouth. Vitamin shoppe 2500 units     • brooke root (BROOKE EXTRACT ORAL) Take 500 mg by mouth. Before meals.  This is an Aloe Vera liquid with brooke in it      • HERBAL DRUGS ORAL Take by mouth. Wormwood black walnut. MEME. 30 drops        No current facility-administered medications for this visit.        Supplements: [ ]    Family History:   Family History   Problem Relation Age of Onset   • Hypertension Biological Mother    • Obesity Biological Mother    • Kidney disease Biological Mother    • Hypertension Biological Father    • Obesity Biological Father    • Heart disease Biological Father    • Diabetes Biological Father    •  Arthritis Biological Father    • Thyroid cancer Biological Sister    • Cancer Biological Sister    • ADD / ADHD Biological Sister    • Hypertension Biological Brother    • Eczema Biological Brother    • Heart disease Maternal Grandmother    • Hypertension Maternal Grandmother    • Obesity Maternal Grandmother    • Depression Maternal Grandmother    • Hypertension Maternal Grandfather    • Heart disease Maternal Grandfather    • Stroke Maternal Grandfather    • Dementia Paternal Grandmother    • Diabetes Paternal Grandfather    • Cancer Paternal Grandfather          Social History:   Social History     Socioeconomic History   • Marital status:      Spouse name: None   • Number of children: None   • Years of education: None   • Highest education level: None   Occupational History   • None   Social Needs   • Financial resource strain: None   • Food insecurity     Worry: None     Inability: None   • Transportation needs     Medical: None     Non-medical: None   Tobacco Use   • Smoking status: Former Smoker     Packs/day: 1.00     Years: 48.00     Pack years: 48.00     Types: Cigarettes     Start date: 1968     Quit date: 2016     Years since quittin.0   • Smokeless tobacco: Current User   • Tobacco comment: Uses lozenges   Substance and Sexual Activity   • Alcohol use: No   • Drug use: Not Currently   • Sexual activity: None   Lifestyle   • Physical activity     Days per week: None     Minutes per session: None   • Stress: None   Relationships   • Social connections     Talks on phone: None     Gets together: None     Attends Holiness service: None     Active member of club or organization: None     Attends meetings of clubs or organizations: None     Relationship status: None   • Intimate partner violence     Fear of current or ex partner: None     Emotionally abused: None     Physically abused: None     Forced sexual activity: None   Other Topics Concern   • None   Social History Narrative   •  "None        Vitals:   Visit Vitals  /72 (BP Location: Left upper arm, Patient Position: Sitting)   Pulse 72   Temp 36.3 °C (97.3 °F) (Temporal)   Resp 18   Ht 1.638 m (5' 4.5\")   Wt 77.6 kg (171 lb)   SpO2 96%   BMI 28.90 kg/m²       Weight: Weight: 77.6 kg (171 lb)     Height: Height: 163.8 cm (5' 4.5\")     BMI:Body mass index is 28.9 kg/m².    GAIL: [ ]    % Body Fat: [ ]    % Muscle Mass: [ ]    Visceral fat:  []    WC: [ ]    NSR:       HPI  For FU after one month on new SIBO protocol.  Last visit 09/2020.  Wt down 4 lbs since Sept.     One month and not had the   Visbiome afraid of that due to too much yeast.  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      Trying to get insurance for the kids and Nurse did a weight of 164  Tried not to eat much for the last three weeks and forgets to eat.    BP Was good  GI replenish only.      Trampoline activity.  After that does a large hill and walks up the hill.  Burning in the lungs.  Dreams that will be in [place] and run a marathon.  Friend lives there.     Fatigue is better, and happy overall, likes the cold.    Quercetin TID before meals had a sensation of lung opening.    Hives and rashes are less, but yeast? Increases with sweating.  Under the breast and groin, itches and burning.    Derm gave nystatin and triamcinolone.  Felt like bit by mosquitoes.      Raised like lesions.    Someone got C diff from the cream.    Corn starch made her burn.  Gold Acmpbell makes her burn.   Lactulose seemed to make her worse.     Bowels moving every day, gets up too early, got two things of calm, and triphala.  And small piece of kiesch.  Histamine and FODMAPS .      Dental work in the mouth.  Tongue always looks white. No GERD now after 10 years.  Better with the current diet.   Colon 2019, not cleaned out.     B12  2 a day with food  BP very good today.    No shellfish.    Vit D once a week      ACV bloated her.  Ez's 1/2 tsp bothered her, gas.  Pills also.   Hibiscus tea for BP " two a day but boughtr with oscar hips.   Stopped the Vitamin C    Son with SSI carter and then gets an audit for $60,000 and he blew it, has a lot on her mind still.     Review of Systems  1000 mg pills of Vit C.    Doing a lot of Yoga and husb also.    After yoga bones seem to be tough.     Osteobiflex.   Two a day for neck. Yoga, bouncing. Ok to take.    Ginkgo biloba stopped but thought it helped the   Concha   Down to 4 a day, eats more if cuts back.  Two cigarettes sometimes.     Physical Exam  Right and left leg spur in the back of the heel.   Alert, nAD.    Tongue dry and fissured.      Labs: all done in Sept 2020.      MSQ Score: [ ]      ASSESSMENT:  1. Overweight BMI 29  2. Hepatic steatosis on US with normal ALT.  Visceral fat level of 20, PBF 44 on GAIL  3. Hypercholesterolemia, , HDL 47 9/12/2020  4. FHx of CAD and high cholesterol.  CAC just done is ZERO  5. F obese and had DM  6. Hx of elevated BP without HBP on no meds at present.   7. Osteopenia, Hip -1.4 T score  8. Questionable Celiac Disease with flattened villi and lymphocytosis, but not conclusive.  6/2019 bx on EGD and Colon also with bx in Ileum all negative, no H pylori.  Had dyspepsia which cleared when went gluten free however.   9. Snoring, mild fatigue:  Sleep apnea Testing is Negative  10. DANILO, hx of depression, no meds  11. Hx of PCOS and Infertility  12. Stress from two adopted adult children with special needs. One son in court trouble now  13. Ex-smoker on nicotine lozenge 4 a day  14. COPD, mild?  15. RUL stable nodule in lung  16. Menopause at age 39 - 40 with LINH then BSO due to endometriosis/fibroids, Estrogen dominant?  17. Back pain after injured herself trying to move furniture in 2018, left leg weakness since then.  MRI showed L4-5 and L5-S1 disc protrusion and DJD.  Appears to have left hip flexor weakness , Psoas injury?    18. Tenderness over the left shin, related to back, or local problem?  19. TMJ  20. Periodontal  disease, root canals, infected?  21. Abdomen bloating, change in bowels off and on, food intolerances;  Multiple antibiotics and Appendix as a child, more antibiotics for acne as a teen, more for sinus infections:  godfrey has altered microbiome/dysbiosis and SIBO Present on Khurram breath test, methane producing .  Began treating herself with oregano oil and the wormwood black walnut tincture, also peppermint oil and milk thistle.   22. Varicose veins mild  23. Tension headaches, stress/anxiety/sleep?  24. Allergies  25. Rash after fasting?  26. Anaphyllaxis to PCN, Sulfa  27. Hx of HPV   28. Multiple sensitivities to smoke, paint, perfumes, noises, EMF's.  Detox enzyme SNP's?  29. Water damage to home with possible bacterial and mold toxin exposures.   TGFB1 is normal, CIRS less likely  30. Also probable concussion in 2018  31. 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 Insufficient vitamin B12 at 402  33. Vitamin D deficiency at 21  34.   1. The patient reviewed and signed our practice consents at their first visit, including their pledge to obtain primary care services from a primary care physician and that we will not serve the role, and also will not bill insurance, as this is a cash only practice.  We also discussed the nature of an integrative medicine practice, and that we are offering treatments that are complementary or alternative to traditional medical treatments, particularly when those treatments have not been successful in making the patient healthier.  Most patients come to this practice looking for treatments that are less toxic, and are also looking for approaches that are innovative or different from the traditional medical approach, with the hope of finding greater health through these other approaches.  The patient understands that these treatments are often less studied then medications approved by the FDA, and they still might have side effects,  that require monitoring and follow-up visits in order to assess the patient's response to any treatments we recommend.  The patient also pledges to advise their primary care physician of any treatment programs we undertake.  Patient is free to call me, or communicate via email through our portal, with any questions about treatments we propose.      PLAN:  1. [ ]  We discussed fine tuning the treatment plan for SIBO, was methane on Lactulose testing.  Will proceed with GI Response if comes in this week, otherwise will need to do l Glutamine, Slippery elm and turmeric until it does.   2. Continue the Berberine (off the ACV), and instead of the Visbiome (concerned about stimulating yeast overgrowth from some previous reaction to probiotics) will substitute the friendly yeast S Boulardi 2 a day (Versly or biotics research).  Can use the Wormwood black walnut if any bad lorena, bloating or constipation develops.  Call me is any problem with the second phase of the SIBO treatment, for 3 months total at least.  3. Encouraged her physical activity, and yoga.  OK to take Osteobiflex (chondroitin and glucosamine) since it helped with back pain after activity in the past.  May take a month to kick in.  Has no plans to have any invasive procedure for her chronic back pain.  4. Continue the Quercetin twice a day before meals for MCAS reactions (yeast rash only, no other eczema or hives now).  OK to continue the low FODMAPS and low histamine diet she is following.  Encouraged adequate hydration.   5. If takes ginkgo, to avoid other blood thinners, like aspirin or advil.  \  6. Goal weight is low 160's  7. Recheck FU labs in one month of Vit D 50,000 a week, Vit D b12, cmp and iron, and she is requesting ABO and Rh  8. Using nystatin and triamcinolone from Derm:  Consider nystatin powder and/or clotrimezole instead.  Advised her that there is no sign of candida actually in her blood.     RTO:   [ ] 2 mo, consider recheck of a1c and  lipids then, or as per Dr Allen

## 2020-12-07 ENCOUNTER — OFFICE VISIT (OUTPATIENT)
Dept: INTEGRATIVE MEDICINE | Age: 65
End: 2020-12-07

## 2020-12-07 VITALS
DIASTOLIC BLOOD PRESSURE: 72 MMHG | BODY MASS INDEX: 28.49 KG/M2 | RESPIRATION RATE: 18 BRPM | HEART RATE: 72 BPM | OXYGEN SATURATION: 96 % | TEMPERATURE: 97.3 F | SYSTOLIC BLOOD PRESSURE: 118 MMHG | HEIGHT: 65 IN | WEIGHT: 171 LBS

## 2020-12-07 DIAGNOSIS — E61.1 IRON DEFICIENCY: ICD-10-CM

## 2020-12-07 DIAGNOSIS — R51.9 HEADACHE, CHRONIC DAILY: ICD-10-CM

## 2020-12-07 DIAGNOSIS — M85.80 OSTEOPENIA, UNSPECIFIED LOCATION: ICD-10-CM

## 2020-12-07 DIAGNOSIS — D51.3 OTHER DIETARY VITAMIN B12 DEFICIENCY ANEMIA: ICD-10-CM

## 2020-12-07 DIAGNOSIS — E55.9 VITAMIN D DEFICIENCY DISEASE: ICD-10-CM

## 2020-12-07 DIAGNOSIS — K76.0 FATTY LIVER: Primary | ICD-10-CM

## 2020-12-07 DIAGNOSIS — K59.09 CHRONIC CONSTIPATION: ICD-10-CM

## 2020-12-07 DIAGNOSIS — R03.0 BORDERLINE HIGH BLOOD PRESSURE: ICD-10-CM

## 2020-12-07 PROCEDURE — INTG103 PR FOLLOW-UP CONSULT (60 MIN): Performed by: FAMILY MEDICINE

## 2020-12-07 NOTE — PATIENT INSTRUCTIONS
1. SIBO treatment plan:  Call me if no GI Response in two days.  Otherwise you are in Part II of the plan  2. Stop the Visbiome, start Sacchromyces boulardi probiotic, by [employer]. 1 a day for a week, then two a day  3. Quercetin 500 mg twice a day with meals.  4. Can take the Vitamin C daily  5. Ok to take the Osteobiflex and the ginkgo biloba  6. Blood tests in 1 month.

## 2021-02-02 ENCOUNTER — TELEPHONE (OUTPATIENT)
Dept: INTEGRATIVE MEDICINE | Age: 66
End: 2021-02-02

## 2021-02-02 NOTE — TELEPHONE ENCOUNTER
Spoke with mario, we received a Vitamin D 50,000 unit refill request from pharmacy. She was suppose to get labs checked last month. She said that she will go next week to get drawn.  She scheduled follow up with you in 3 weeks.

## 2021-02-18 ENCOUNTER — APPOINTMENT (OUTPATIENT)
Dept: LAB | Age: 66
End: 2021-02-18
Attending: FAMILY MEDICINE
Payer: COMMERCIAL

## 2021-02-18 DIAGNOSIS — K76.0 FATTY (CHANGE OF) LIVER, NOT ELSEWHERE CLASSIFIED: ICD-10-CM

## 2021-02-18 DIAGNOSIS — R51.9 HEADACHE, UNSPECIFIED: ICD-10-CM

## 2021-02-18 PROCEDURE — 30099997 SPECIMEN PROCESSING

## 2021-02-19 LAB
QUEST (ALWAYS MESSAGE): NORMAL
QUEST CONTAINER TYPE:: NORMAL
QUEST QUESTION/PROBLEM: NORMAL
SERVICE CMNT 03-IMP: NORMAL

## 2021-02-23 NOTE — PROGRESS NOTES
[facility] Follow Up Visit    Date: 2021    Name: Adeola Rees    : 1955    Reason for visit:     Allergies: Bee sting [bee venom protein (honey bee)], Honey, Penicillin g, Penicillins, Sulfa (sulfonamide antibiotics), Ceftin [cefuroxime axetil], Ciprofibrate, Ciprofloxacin, and Shellfish derived    Medications:   Current Outpatient Medications   Medication Sig Dispense Refill   • Bifidobacterium infantis (DIGESTIVE PROBIOTIC ORAL) Take by mouth. Triphala     • CALCIUM ORAL Take by mouth. Natural Vitality Calm and Calcium 1 tsp BID     • MAGNESIUM ORAL Take by mouth.     • nicotine polacrilex (COMMIT) 4 mg lozenge Apply 4 mg to cheek See admin instr. q 5 hours PRN      • RESVERATROL ORAL Take by mouth. Trunature, 75 mg     • cyanocobalamin, vitamin B-12, (VITAMIN B-12 ORAL) Take 5,000 mcg by mouth. Vitamin shoppe     • ergocalciferol (VITAMIN D2) 50,000 unit(1250 mcg) capsule Take 50,000 Units by mouth once a week. With a meal 12 capsule 0   • HERBAL DRUGS ORAL Take by mouth. Berberine 500 mg BID     • OREGANO OIL ORAL Take by mouth.     • quercetin 500 mg. Solaray     • tretinoin (RETIN-A TOP) Apply topically.       No current facility-administered medications for this visit.        Supplements: [ ]    Family History:   Family History   Problem Relation Age of Onset   • Hypertension Biological Mother    • Obesity Biological Mother    • Kidney disease Biological Mother    • Hypertension Biological Father    • Obesity Biological Father    • Heart disease Biological Father    • Diabetes Biological Father    • Arthritis Biological Father    • Thyroid cancer Biological Sister    • Cancer Biological Sister    • ADD / ADHD Biological Sister    • Hypertension Biological Brother    • Eczema Biological Brother    • Heart disease Maternal Grandmother    • Hypertension Maternal Grandmother    • Obesity Maternal Grandmother    • Depression Maternal Grandmother    •  "Hypertension Maternal Grandfather    • Heart disease Maternal Grandfather    • Stroke Maternal Grandfather    • Dementia Paternal Grandmother    • Diabetes Paternal Grandfather    • Cancer Paternal Grandfather          Social History:   Social History     Socioeconomic History   • Marital status:      Spouse name: None   • Number of children: None   • Years of education: None   • Highest education level: None   Occupational History   • None   Social Needs   • Financial resource strain: None   • Food insecurity     Worry: None     Inability: None   • Transportation needs     Medical: None     Non-medical: None   Tobacco Use   • Smoking status: Former Smoker     Packs/day: 1.00     Years: 48.00     Pack years: 48.00     Types: Cigarettes     Start date: 1968     Quit date: 2016     Years since quittin.2   • Smokeless tobacco: Current User   • Tobacco comment: Uses lozenges   Substance and Sexual Activity   • Alcohol use: No   • Drug use: Not Currently   • Sexual activity: None   Lifestyle   • Physical activity     Days per week: None     Minutes per session: None   • Stress: None   Relationships   • Social connections     Talks on phone: None     Gets together: None     Attends Jainism service: None     Active member of club or organization: None     Attends meetings of clubs or organizations: None     Relationship status: None   • Intimate partner violence     Fear of current or ex partner: None     Emotionally abused: None     Physically abused: None     Forced sexual activity: None   Other Topics Concern   • None   Social History Narrative   • None        Vitals:   Visit Vitals  Ht 1.638 m (5' 4.5\")   Wt 74.8 kg (165 lb)   BMI 27.88 kg/m²       Weight: Weight: 74.8 kg (165 lb)     Height: Height: 163.8 cm (5' 4.5\")     BMI:Body mass index is 27.88 kg/m².    GAIL: [ ]    % Body Fat: [ ]    % Muscle Mass: [ ]    Visceral fat:  []    WC: [ ]    NSR:       HPI   FU from December visit about GI and " other issues  Felt funny on the GI protocol, s jacquelin  Bleeding  Rectal bleeding  Some burning in the right lower side for a day    Not straining, a lot of bleeding?  When takes ASA gets a lot of bleeding.   Switched a week and then took the powder and only bled  A little, then waited a week and took the pill and bled a lot.     Triphala and not bleeding now.   B+    2 years ago EGD and Colon was done.   Conde going down when eats  Aloe vera gel, 2 0z QID    Peterson and flaxseed ground, sesame seed crackers, Whole 30   Stopped coffee, feels it helps her liver so is going to do two cups a day again.     Weight:  lost a few pounds, fasting for two days, then eats for two days, and alternates often.  .    Did yoga, and Danced and and trampoline for exercise.  No CP or SOB.    Very spiritual    Review of Systems    Physical Exam ZOOM    Labs: Glucose 77, CMP normal except for ALT just elevated at 30.  Iron and TIBC normal  B+ blood type  B12 greater than 2000    MSQ Score: [ ]    MoCA  Score: [ ]    ASSESSMENT:  1. Overweight BMI 29  2. Hepatic steatosis on US with normal ALT.  Visceral fat level of 20, PBF 44 on GAIL  3. Hypercholesterolemia, , HDL 47 9/12/2020  4. FHx of CAD and high cholesterol.  CAC just done is ZERO  5. F obese and had DM  6. Hx of elevated BP without HBP on no meds at present.   7. Osteopenia, Hip -1.4 T score  8. Questionable Celiac Disease with flattened villi and lymphocytosis, but not conclusive.  6/2019 bx on EGD and Colon also with bx in Ileum all negative, no H pylori.  Had dyspepsia which cleared when went gluten free however.   9. Snoring, mild fatigue:  Sleep apnea Testing is Negative  10. DANILO, hx of depression, no meds  11. Hx of PCOS and Infertility  12. Stress from two adopted adult children with special needs. One son in court trouble now  13. Ex-smoker on nicotine lozenge 4 a day  14. COPD, mild?  15. RUL stable nodule in lung  16. Menopause at age 39 - 40 with LINH then BSO  due to endometriosis/fibroids, Estrogen dominant?  17. Back pain after injured herself trying to move furniture in 2018, left leg weakness since then.  MRI showed L4-5 and L5-S1 disc protrusion and DJD.  Appears to have left hip flexor weakness , Psoas injury?    18. Tenderness over the left shin, related to back, or local problem?  19. TMJ  20. Periodontal disease, root canals, infected?  21. Abdomen bloating, change in bowels off and on, food intolerances;  Multiple antibiotics and Appendix as a child, more antibiotics for acne as a teen, more for sinus infections:  godfrey has altered microbiome/dysbiosis and SIBO Present on Khurram breath test, methane producing .  Began treating herself with oregano oil and the wormwood black walnut tincture, also peppermint oil and milk thistle.   Then intolerant of GI Response and S boulardi.    22. Varicose veins mild  23. Tension headaches, stress/anxiety/sleep?  24. Allergies  25. Rash after fasting?  26. Anaphyllaxis to PCN, Sulfa  27. Hx of HPV   28. Multiple sensitivities to smoke, paint, perfumes, noises, EMF's.  Detox enzyme SNP's?  29. Water damage to home with possible bacterial and mold toxin exposures.   TGFB1 is normal, CIRS less likely  30. Also probable concussion in 2018  31. 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 Insufficient vitamin B12 at 402  33. Vitamin D deficiency at 21      1. The patient reviewed and signed our practice consents at their first visit, including their pledge to obtain primary care services from a primary care physician and that we will not serve the role, and also will not bill insurance, as this is a cash only practice.  We also discussed the nature of an integrative medicine practice, and that we are offering treatments that are complementary or alternative to traditional medical treatments, particularly when those treatments have not been successful in making the patient healthier.   Most patients come to this practice looking for treatments that are less toxic, and are also looking for approaches that are innovative or different from the traditional medical approach, with the hope of finding greater health through these other approaches.  The patient understands that these treatments are often less studied then medications approved by the FDA, and they still might have side effects, that require monitoring and follow-up visits in order to assess the patient's response to any treatments we recommend.  The patient also pledges to advise their primary care physician of any treatment programs we undertake.  Patient is free to call me, or communicate via email through our portal, with any questions about treatments we propose.      PLAN: So she had several complications from trying to treat her small intestine bacterial overgrowth with methane producing bacteria.  She reports some rectal bleeding after the simple supplements of Saccharomyces Boulardii and the GI response powder.   She feels good at the moment just taking 1 Triphala a day, and an aloe gel product.  She would like further treatment for the possibility of overgrowth of Candida, which may include esophageal candidiasis or SIBO from Candida.  1. We will go with a relatively low risk option of oral nystatin for 1 month, then will restart the berberine as both an antimicrobial and as a good treatment for fatty liver and cholesterol.  2. After 1 month on the nystatin she will also start L-glutamine powder alone by [facility] 1[facility] which is 2000 mg a day  3. She may take milk thistle daily starting now  4. She may take the Triphala 1 or 2 pills a day if she tolerates it.  5. Advised her to repeat the blood test about her liver and probably a lipid panel in 3 months  6. She may follow-up with Dr. Beasley at the [place] office.  7. Consider Energy Medicine approach such as [facility] ALANIZ

## 2021-02-25 ENCOUNTER — TELEMEDICINE (OUTPATIENT)
Dept: INTEGRATIVE MEDICINE | Age: 66
End: 2021-02-25

## 2021-02-25 ENCOUNTER — TELEPHONE (OUTPATIENT)
Dept: INTEGRATIVE MEDICINE | Age: 66
End: 2021-02-25

## 2021-02-25 VITALS — BODY MASS INDEX: 27.49 KG/M2 | HEIGHT: 65 IN | WEIGHT: 165 LBS

## 2021-02-25 DIAGNOSIS — K76.0 FATTY LIVER: Primary | ICD-10-CM

## 2021-02-25 DIAGNOSIS — E78.00 HYPERCHOLESTEREMIA: ICD-10-CM

## 2021-02-25 LAB
25(OH)D3 SERPL-MCNC: 27 NG/ML (ref 30–100)
ABO GROUP BLD: NORMAL
ALBUMIN SERPL-MCNC: 4.5 G/DL (ref 3.6–5.1)
ALBUMIN/GLOB SERPL: 1.9 (CALC) (ref 1–2.5)
ALP SERPL-CCNC: 87 U/L (ref 37–153)
ALT SERPL-CCNC: 30 U/L (ref 6–29)
AST SERPL-CCNC: 27 U/L (ref 10–35)
BILIRUB SERPL-MCNC: 1 MG/DL (ref 0.2–1.2)
BUN SERPL-MCNC: 13 MG/DL (ref 7–25)
BUN/CREAT SERPL: ABNORMAL (CALC) (ref 6–22)
CALCIUM SERPL-MCNC: 9.5 MG/DL (ref 8.6–10.4)
CHLORIDE SERPL-SCNC: 102 MMOL/L (ref 98–110)
CO2 SERPL-SCNC: 29 MMOL/L (ref 20–32)
CREAT SERPL-MCNC: 0.84 MG/DL (ref 0.5–0.99)
GLOBULIN SER CALC-MCNC: 2.4 G/DL (CALC) (ref 1.9–3.7)
GLUCOSE SERPL-MCNC: 77 MG/DL (ref 65–99)
IRON SATN MFR SERPL: 35 % (CALC) (ref 16–45)
IRON SERPL-MCNC: 125 MCG/DL (ref 45–160)
POTASSIUM SERPL-SCNC: 4.5 MMOL/L (ref 3.5–5.3)
PROT SERPL-MCNC: 6.9 G/DL (ref 6.1–8.1)
QUEST EGFR NON-AFR. AMERICAN: 74 ML/MIN/1.73M2
RH BLD: NORMAL
SODIUM SERPL-SCNC: 140 MMOL/L (ref 135–146)
TIBC SERPL-MCNC: 356 MCG/DL (CALC) (ref 250–450)
VIT B12 SERPL-MCNC: >2000 PG/ML (ref 200–1100)

## 2021-02-25 PROCEDURE — INTG103 PR FOLLOW-UP CONSULT (60 MIN): Mod: 95 | Performed by: FAMILY MEDICINE

## 2021-02-25 NOTE — TELEPHONE ENCOUNTER
CALL:  Vitamin D level improved from 21 to 27, so I would continue the 50,000 units a week pill of Vitamin D for 12 weeks, then start Vitamin D3 OTC 2000 units once a day with a meal.  Rx 50,000 unit pills #12, 1 a week with a meal for 12 weeks.     CORBIN

## 2021-02-25 NOTE — TELEPHONE ENCOUNTER
Adeola called, never discussed low vitamin d level. Does she need to continue 50,000 unit dose? If so will need a RX or can she switch to OTC.

## 2021-02-25 NOTE — PATIENT INSTRUCTIONS
1. Vitamin B12 stop for 2 weeks, then start only 1000 micrograms a day  2. Nystatin pills four times a day for one month, then restart the Berberine 500 mg twice a day  3. After one month:  L-Glutamine powder by [facility] 1/2 tsp a day in water  4. Milk thistle daily  5. Himalaya Triphala 1- 2 a day  6. After one month look for follow up with Dr Alexandra Beasley at the [place] office  7. Redo blood test about your liver in 3 months and ask Dr Allen to look up the results from [facility].

## 2021-02-26 RX ORDER — ERGOCALCIFEROL 1.25 MG/1
50000 CAPSULE ORAL WEEKLY
Qty: 12 CAPSULE | Refills: 0 | Status: SHIPPED | OUTPATIENT
Start: 2021-02-26 | End: 2021-09-29 | Stop reason: ALTCHOICE

## 2021-03-18 ENCOUNTER — DOCUMENTATION (OUTPATIENT)
Dept: PRIMARY CARE | Facility: CLINIC | Age: 66
End: 2021-03-18

## 2021-03-18 NOTE — PROGRESS NOTES
Megha Wheat MA has completed a chart review for Adeola Rees and have determined that the care gap has been satisfied.    Care Gap Source: [employer]    Care Gap(s) Identified: Cervical Cancer Screening    Chart Review Completed: Yes     Pt is showing to be due for cervical cancer screening. Pt had Hysterectomy in 1995, no outreach needed at this time.

## 2021-07-28 ENCOUNTER — TELEPHONE (OUTPATIENT)
Dept: PULMONOLOGY | Facility: HOSPITAL | Age: 66
End: 2021-07-28

## 2021-07-28 NOTE — TELEPHONE ENCOUNTER
Adeola called me back and said she has an appt with Dr. Allen next week and would mention the lung screening.

## 2021-07-30 ENCOUNTER — APPOINTMENT (OUTPATIENT)
Dept: LAB | Age: 66
End: 2021-07-30
Attending: FAMILY MEDICINE
Payer: COMMERCIAL

## 2021-07-30 DIAGNOSIS — E78.00 PURE HYPERCHOLESTEROLEMIA, UNSPECIFIED: ICD-10-CM

## 2021-07-30 DIAGNOSIS — K76.0 FATTY (CHANGE OF) LIVER, NOT ELSEWHERE CLASSIFIED: ICD-10-CM

## 2021-07-30 PROCEDURE — 30099997 SPECIMEN PROCESSING

## 2021-08-02 ENCOUNTER — OFFICE VISIT (OUTPATIENT)
Dept: PRIMARY CARE | Facility: CLINIC | Age: 66
End: 2021-08-02
Payer: COMMERCIAL

## 2021-08-02 ENCOUNTER — TELEPHONE (OUTPATIENT)
Dept: PRIMARY CARE | Facility: CLINIC | Age: 66
End: 2021-08-02

## 2021-08-02 VITALS
RESPIRATION RATE: 18 BRPM | DIASTOLIC BLOOD PRESSURE: 80 MMHG | HEART RATE: 62 BPM | OXYGEN SATURATION: 99 % | BODY MASS INDEX: 29.02 KG/M2 | SYSTOLIC BLOOD PRESSURE: 120 MMHG | WEIGHT: 170 LBS | HEIGHT: 64 IN

## 2021-08-02 VITALS — HEIGHT: 64 IN | WEIGHT: 170 LBS | BODY MASS INDEX: 29.02 KG/M2

## 2021-08-02 DIAGNOSIS — J44.9 CHRONIC OBSTRUCTIVE PULMONARY DISEASE, UNSPECIFIED COPD TYPE (CMS/HCC): Primary | ICD-10-CM

## 2021-08-02 DIAGNOSIS — Z87.891 FORMER SMOKER: ICD-10-CM

## 2021-08-02 PROCEDURE — 3008F BODY MASS INDEX DOCD: CPT | Performed by: FAMILY MEDICINE

## 2021-08-02 PROCEDURE — 3079F DIAST BP 80-89 MM HG: CPT | Performed by: FAMILY MEDICINE

## 2021-08-02 PROCEDURE — 99214 OFFICE O/P EST MOD 30 MIN: CPT | Performed by: FAMILY MEDICINE

## 2021-08-02 PROCEDURE — 3074F SYST BP LT 130 MM HG: CPT | Performed by: FAMILY MEDICINE

## 2021-08-02 ASSESSMENT — PAIN SCALES - GENERAL: PAINLEVEL: 0-NO PAIN

## 2021-08-02 NOTE — TELEPHONE ENCOUNTER
Adeola called back and has an order for a lung screening.  She answered the interview questions and was transferred to scheduling,

## 2021-08-02 NOTE — PROGRESS NOTES
Subjective      Patient ID: Adeola Rees is a 65 y.o. female.  1955      HPI Needs a followup for Low dose CT lung scan.    The following have been reviewed and updated as appropriate in this visit:  Allergies  Meds  Problems       Review of Systems    Active Ambulatory Problems     Diagnosis Date Noted   • Left leg pain 03/13/2018   • Left leg numbness 04/17/2018   • Bite, insect 07/10/2018   • Chronic constipation 02/14/2019   • Abdominal bloating 02/14/2019   • Fecal impaction (CMS/[facility]) 02/14/2019   • Mental status change resolved 04/09/2019   • Headache, chronic daily 04/09/2019   • Memory loss 04/09/2019   • Well adult health check 11/05/2019   • Fatty liver 11/05/2019   • Screening for lipid disorders 11/05/2019   • Screening for diabetes mellitus (DM) 11/05/2019   • COPD (chronic obstructive pulmonary disease) (CMS/[facility]) 09/11/2020   • Hypertension 09/11/2020   • Chronic bilateral low back pain with bilateral sciatica 09/11/2020     Resolved Ambulatory Problems     Diagnosis Date Noted   • No Resolved Ambulatory Problems     Past Medical History:   Diagnosis Date   • Celiac disease    • Chemical sensitivity    • Depression    • Environmental allergies    • Female infertility    • H/O gastroesophageal reflux (GERD)    • High cholesterol    • History of bronchitis    • History of sinusitis    • History of skin cancer    • History of traumatic head injury    • Nasal sinus polyp    • Osteoarthritis    • Osteopenia    • Psoriasis    • Sleep apnea        Current Outpatient Medications:   •  Bifidobacterium infantis (DIGESTIVE PROBIOTIC ORAL), Take by mouth. Triphala, Disp: , Rfl:   •  CALCIUM ORAL, Take by mouth. Natural Vitality Calm and Calcium 1 tsp BID, Disp: , Rfl:   •  cyanocobalamin, vitamin B-12, (VITAMIN B-12 ORAL), Take 5,000 mcg by mouth. Vitamin shoppe, Disp: , Rfl:   •  MAGNESIUM ORAL, Take by mouth., Disp: , Rfl:   •  nicotine polacrilex (COMMIT) 4 mg lozenge, Apply 4 mg to cheek See admin  instr. q 5 hours PRN , Disp: , Rfl:   •  RESVERATROL ORAL, Take by mouth. Trunature, 75 mg, Disp: , Rfl:   •  tretinoin (RETIN-A TOP), Apply topically., Disp: , Rfl:   •  ergocalciferol (VITAMIN D2) 50,000 unit(1250 mcg) capsule, Take 1 capsule (50,000 Units total) by mouth once a week. With a meal, Disp: 12 capsule, Rfl: 0  •  HERBAL DRUGS ORAL, Take by mouth. Berberine 500 mg BID, Disp: , Rfl:   •  OREGANO OIL ORAL, Take by mouth., Disp: , Rfl:   •  quercetin, 500 mg. Solaray, Disp: , Rfl:   Allergies   Allergen Reactions   • Bee Sting [Bee Venom Protein (Honey Bee)] Anaphylaxis   • Honey    • Penicillin G Anaphylaxis   • Penicillins Anaphylaxis     Other reaction(s): Anaphylaxis   • Sulfa (Sulfonamide Antibiotics) Anaphylaxis   • Ceftin [Cefuroxime Axetil] Other (see comments)     Per patient bone pain   • Ciprofibrate    • Ciprofloxacin      Other reaction(s): Muscular pain   • Shellfish Derived Hives     Social History     Socioeconomic History   • Marital status:      Spouse name: None   • Number of children: None   • Years of education: None   • Highest education level: None   Occupational History   • None   Tobacco Use   • Smoking status: Former Smoker     Packs/day: 1.00     Years: 48.00     Pack years: 48.00     Types: Cigarettes     Start date: 1968     Quit date: 2016     Years since quittin.6   • Smokeless tobacco: Current User   • Tobacco comment: Uses lozenges   Vaping Use   • Vaping Use: Never used   Substance and Sexual Activity   • Alcohol use: No   • Drug use: Not Currently   • Sexual activity: Defer   Other Topics Concern   • None   Social History Narrative   • None     Social Determinants of Health     Financial Resource Strain:    • Difficulty of Paying Living Expenses:    Food Insecurity:    • Worried About Running Out of Food in the Last Year:    • Ran Out of Food in the Last Year:    Transportation Needs:    • Lack of Transportation (Medical):    • Lack of Transportation  "(Non-Medical):    Physical Activity:    • Days of Exercise per Week:    • Minutes of Exercise per Session:    Stress:    • Feeling of Stress :    Social Connections:    • Frequency of Communication with Friends and Family:    • Frequency of Social Gatherings with Friends and Family:    • Attends Shinto Services:    • Active Member of Clubs or Organizations:    • Attends Club or Organization Meetings:    • Marital Status:    Intimate Partner Violence:    • Fear of Current or Ex-Partner:    • Emotionally Abused:    • Physically Abused:    • Sexually Abused:      Family History   Problem Relation Age of Onset   • Hypertension Biological Mother    • Obesity Biological Mother    • Kidney disease Biological Mother    • Hypertension Biological Father    • Obesity Biological Father    • Heart disease Biological Father    • Diabetes Biological Father    • Arthritis Biological Father    • Thyroid cancer Biological Sister    • Cancer Biological Sister    • ADD / ADHD Biological Sister    • Hypertension Biological Brother    • Eczema Biological Brother    • Heart disease Maternal Grandmother    • Hypertension Maternal Grandmother    • Obesity Maternal Grandmother    • Depression Maternal Grandmother    • Hypertension Maternal Grandfather    • Heart disease Maternal Grandfather    • Stroke Maternal Grandfather    • Dementia Paternal Grandmother    • Diabetes Paternal Grandfather    • Cancer Paternal Grandfather        Past Surgical History:   Procedure Laterality Date   • APPENDECTOMY     • APPENDECTOMY  1970   • COLONOSCOPY  07/2019   • HYSTERECTOMY  1995   • OOPHORECTOMY     • TONSILLECTOMY     • TONSILLECTOMY  1964   • UPPER GASTROINTESTINAL ENDOSCOPY  07/2019   • WISDOM TOOTH EXTRACTION  1976       Objective     Vitals:    08/02/21 1100   BP: 120/80   Pulse: 62   Resp: 18   SpO2: 99%   Weight: 77.1 kg (170 lb)   Height: 1.626 m (5' 4\")     Body mass index is 29.18 kg/m².    Physical Exam  Vitals and nursing note reviewed. "   HENT:      Head: Normocephalic.   Eyes:      Pupils: Pupils are equal, round, and reactive to light.   Cardiovascular:      Rate and Rhythm: Normal rate and regular rhythm.   Pulmonary:      Effort: Pulmonary effort is normal.   Abdominal:      General: Abdomen is flat.   Musculoskeletal:         General: Normal range of motion.   Skin:     General: Skin is warm.   Neurological:      General: No focal deficit present.      Mental Status: She is alert and oriented to person, place, and time.   Psychiatric:         Mood and Affect: Mood normal.         Assessment/Plan   Diagnoses and all orders for this visit:    Chronic obstructive pulmonary disease, unspecified COPD type (CMS/HCC) (Primary)  -     CT LUNG SCREENING WITHOUT IV CONTRAST; Future    Former smoker  -     CT LUNG SCREENING WITHOUT IV CONTRAST; Future

## 2021-08-02 NOTE — TELEPHONE ENCOUNTER
[facility] for CT Lung submitted and approved via [employer]    Authorization #[id number]  Valid 08/02/2021 to 01/29/2022  Location [facility]

## 2021-08-04 ENCOUNTER — HOSPITAL ENCOUNTER (OUTPATIENT)
Dept: RADIOLOGY | Age: 66
Discharge: HOME | End: 2021-08-04
Attending: FAMILY MEDICINE
Payer: COMMERCIAL

## 2021-08-04 DIAGNOSIS — Z87.891 FORMER SMOKER: ICD-10-CM

## 2021-08-04 DIAGNOSIS — J44.9 CHRONIC OBSTRUCTIVE PULMONARY DISEASE, UNSPECIFIED COPD TYPE (CMS/HCC): ICD-10-CM

## 2021-08-04 PROCEDURE — G1004 CDSM NDSC: HCPCS

## 2021-08-04 PROCEDURE — 71271 CT THORAX LUNG CANCER SCR C-: CPT | Mod: ME

## 2021-08-12 ENCOUNTER — TRANSCRIBE ORDERS (OUTPATIENT)
Dept: SCHEDULING | Age: 66
End: 2021-08-12

## 2021-08-12 ENCOUNTER — TRANSCRIBE ORDERS (OUTPATIENT)
Dept: ADMINISTRATIVE | Age: 66
End: 2021-08-12

## 2021-08-12 DIAGNOSIS — N63.0 UNSPECIFIED LUMP IN UNSPECIFIED BREAST: Primary | ICD-10-CM

## 2021-08-20 ENCOUNTER — APPOINTMENT (OUTPATIENT)
Dept: RADIOLOGY | Age: 66
End: 2021-08-20
Payer: COMMERCIAL

## 2021-08-20 ENCOUNTER — HOSPITAL ENCOUNTER (OUTPATIENT)
Dept: RADIOLOGY | Age: 66
Discharge: HOME | End: 2021-08-20
Attending: SPECIALIST
Payer: COMMERCIAL

## 2021-08-20 DIAGNOSIS — N63.0 UNSPECIFIED LUMP IN UNSPECIFIED BREAST: ICD-10-CM

## 2021-08-20 PROCEDURE — 76642 ULTRASOUND BREAST LIMITED: CPT | Mod: LT

## 2021-08-20 PROCEDURE — 77066 DX MAMMO INCL CAD BI: CPT

## 2021-08-23 ENCOUNTER — TELEPHONE (OUTPATIENT)
Dept: PRIMARY CARE | Facility: CLINIC | Age: 66
End: 2021-08-23

## 2021-08-23 NOTE — TELEPHONE ENCOUNTER
LVM to call and set up an appt. Patient is switching from Dr Allen to Dr Jeffery. Dr's have been notified-ty

## 2021-09-23 ENCOUNTER — RX ONLY (RX ONLY)
Age: 66
End: 2021-09-23

## 2021-09-23 ENCOUNTER — APPOINTMENT (OUTPATIENT)
Dept: URBAN - METROPOLITAN AREA CLINIC 200 | Age: 66
Setting detail: DERMATOLOGY
End: 2021-09-24

## 2021-09-23 DIAGNOSIS — Z85.828 PERSONAL HISTORY OF OTHER MALIGNANT NEOPLASM OF SKIN: ICD-10-CM

## 2021-09-23 DIAGNOSIS — Z11.52 ENCOUNTER FOR SCREENING FOR COVID-19: ICD-10-CM

## 2021-09-23 DIAGNOSIS — L57.8 OTHER SKIN CHANGES DUE TO CHRONIC EXPOSURE TO NONIONIZING RADIATION: ICD-10-CM

## 2021-09-23 PROBLEM — D23.5 OTHER BENIGN NEOPLASM OF SKIN OF TRUNK: Status: ACTIVE | Noted: 2021-09-23

## 2021-09-23 PROCEDURE — 99213 OFFICE O/P EST LOW 20 MIN: CPT

## 2021-09-23 PROCEDURE — OTHER SCREENING FOR COVID-19: OTHER

## 2021-09-23 PROCEDURE — OTHER SUNSCREEN RECOMMENDATIONS: OTHER

## 2021-09-23 PROCEDURE — OTHER COUNSELING: OTHER

## 2021-09-23 RX ORDER — SODIUM SULFACETAMIDE AND SULFUR 80; 40 MG/ML; MG/ML
LOTION TOPICAL
Qty: 1 | Refills: 6 | Status: ERX | COMMUNITY
Start: 2021-09-23

## 2021-09-23 RX ORDER — TAZAROTENE 0.1 MG/G
CREAM CUTANEOUS
Qty: 30 | Refills: 5 | Status: ERX | COMMUNITY
Start: 2021-09-23

## 2021-09-23 ASSESSMENT — LOCATION DETAILED DESCRIPTION DERM
LOCATION DETAILED: PERIUMBILICAL SKIN
LOCATION DETAILED: LEFT INFERIOR ANTERIOR NECK

## 2021-09-23 ASSESSMENT — LOCATION ZONE DERM
LOCATION ZONE: TRUNK
LOCATION ZONE: NECK

## 2021-09-23 ASSESSMENT — LOCATION SIMPLE DESCRIPTION DERM
LOCATION SIMPLE: ABDOMEN
LOCATION SIMPLE: LEFT ANTERIOR NECK

## 2021-09-28 NOTE — PROCEDURE: COUNSELING
Patient Specific Counseling (Will Not Stick From Patient To Patient): Mix zinc with nystatin triamcinolone
Detail Level: Zone
Detail Level: Generalized
The patient is a 39y year old Female complaining of flank pain.

## 2021-09-29 ENCOUNTER — OFFICE VISIT (OUTPATIENT)
Dept: PRIMARY CARE | Facility: CLINIC | Age: 66
End: 2021-09-29
Payer: COMMERCIAL

## 2021-09-29 VITALS
DIASTOLIC BLOOD PRESSURE: 72 MMHG | SYSTOLIC BLOOD PRESSURE: 132 MMHG | BODY MASS INDEX: 29.71 KG/M2 | WEIGHT: 174 LBS | HEART RATE: 82 BPM | HEIGHT: 64 IN | RESPIRATION RATE: 16 BRPM | OXYGEN SATURATION: 97 % | TEMPERATURE: 98.1 F

## 2021-09-29 DIAGNOSIS — E53.8 B12 DEFICIENCY: ICD-10-CM

## 2021-09-29 DIAGNOSIS — R14.0 ABDOMINAL BLOATING: Primary | ICD-10-CM

## 2021-09-29 DIAGNOSIS — G89.29 CHRONIC RIGHT LOWER QUADRANT PAIN: ICD-10-CM

## 2021-09-29 DIAGNOSIS — E55.9 VITAMIN D DEFICIENCY: ICD-10-CM

## 2021-09-29 DIAGNOSIS — R10.31 CHRONIC RIGHT LOWER QUADRANT PAIN: ICD-10-CM

## 2021-09-29 PROCEDURE — 99214 OFFICE O/P EST MOD 30 MIN: CPT | Performed by: FAMILY MEDICINE

## 2021-09-29 PROCEDURE — 3008F BODY MASS INDEX DOCD: CPT | Performed by: FAMILY MEDICINE

## 2021-09-29 PROCEDURE — 3078F DIAST BP <80 MM HG: CPT | Performed by: FAMILY MEDICINE

## 2021-09-29 PROCEDURE — 3075F SYST BP GE 130 - 139MM HG: CPT | Performed by: FAMILY MEDICINE

## 2021-09-29 RX ORDER — VIT C/E/ZN/COPPR/LUTEIN/ZEAXAN 250MG-90MG
5000 CAPSULE ORAL DAILY
COMMUNITY
End: 2023-04-19

## 2021-09-29 ASSESSMENT — PATIENT HEALTH QUESTIONNAIRE - PHQ9: SUM OF ALL RESPONSES TO PHQ9 QUESTIONS 1 & 2: 0

## 2021-09-29 NOTE — PROGRESS NOTES
"alex Valdivia D.O.   [facility] in [address], [address]  [place] [address] [phone]  Fax [phone]          History of Present Illness   Patient ID: Adeola Rees is a 65 y.o. female.    Subjective   Pt is NPV to practice    HPI  Pt [facility]   multiple questions-- states that she has hashimotos/ moore diagnosis---has labs to review form previous ov---  Has complaints of pain --more pain at R sided--off and on.     Has indigestion--> reviewed colonoscopy resluts, never was on protonix  --feels complaitns are due to parasite infection, \"worms\"---has seen in BM,  Pt has had prior complaints in past and o/p tets was done and was negative.     No episodes of weight los noted,  States has abdominal pain due to that...    Has vitamin d def in past,  B12 in past.      -has not had biopsy of liver, discussion of what fatty liver is today     Past Medical/Surgical/Family/Social History     The following have been reviewed and updated as appropriate in this visit:  Meds         Past Medical History:   Diagnosis Date   • Cancer (CMS/HCC)     skin cancer-basal   • Celiac disease    • Chemical sensitivity    • COPD (chronic obstructive pulmonary disease) (CMS/HCC)    • Depression    • Environmental allergies    • Female infertility    • H/O gastroesophageal reflux (GERD)    • High cholesterol    • History of bronchitis    • History of sinusitis    • History of skin cancer    • History of traumatic head injury    • Hypertension    • Nasal sinus polyp    • Osteoarthritis    • Osteopenia    • Psoriasis    • Sleep apnea        Past Surgical History:   Procedure Laterality Date   • APPENDECTOMY     • APPENDECTOMY  1970   • COLONOSCOPY  07/2019   • HYSTERECTOMY  1995   • OOPHORECTOMY     • TONSILLECTOMY     • TONSILLECTOMY  1964   • UPPER GASTROINTESTINAL ENDOSCOPY  07/2019   • WISDOM TOOTH EXTRACTION  1976       Family History   Problem Relation " Age of Onset   • Hypertension Biological Mother    • Obesity Biological Mother    • Kidney disease Biological Mother    • Hypertension Biological Father    • Obesity Biological Father    • Heart disease Biological Father    • Diabetes Biological Father    • Arthritis Biological Father    • Thyroid cancer Biological Sister    • Cancer Biological Sister    • ADD / ADHD Biological Sister    • Hypertension Biological Brother    • Eczema Biological Brother    • Heart disease Maternal Grandmother    • Hypertension Maternal Grandmother    • Obesity Maternal Grandmother    • Depression Maternal Grandmother    • Hypertension Maternal Grandfather    • Heart disease Maternal Grandfather    • Stroke Maternal Grandfather    • Dementia Paternal Grandmother    • Diabetes Paternal Grandfather    • Cancer Paternal Grandfather        Social History     Socioeconomic History   • Marital status:      Spouse name: Not on file   • Number of children: Not on file   • Years of education: Not on file   • Highest education level: Not on file   Occupational History   • Occupation: homemaker   Tobacco Use   • Smoking status: Former Smoker     Packs/day: 1.00     Years: 48.00     Pack years: 48.00     Types: Cigarettes     Start date: 1968     Quit date: 2016     Years since quittin.8   • Smokeless tobacco: Never Used   • Tobacco comment: Uses lozenges   Vaping Use   • Vaping Use: Never used   Substance and Sexual Activity   • Alcohol use: Not Currently   • Drug use: Never   • Sexual activity: Defer   Other Topics Concern   • Not on file   Social History Narrative    Has two adopted children     Social Determinants of Health     Financial Resource Strain:    • Difficulty of Paying Living Expenses: Not on file   Food Insecurity:    • Worried About Running Out of Food in the Last Year: Not on file   • Ran Out of Food in the Last Year: Not on file   Transportation Needs:    • Lack of Transportation (Medical): Not on file   •  Lack of Transportation (Non-Medical): Not on file   Physical Activity:    • Days of Exercise per Week: Not on file   • Minutes of Exercise per Session: Not on file   Stress:    • Feeling of Stress : Not on file   Social Connections:    • Frequency of Communication with Friends and Family: Not on file   • Frequency of Social Gatherings with Friends and Family: Not on file   • Attends Quaker Services: Not on file   • Active Member of Clubs or Organizations: Not on file   • Attends Club or Organization Meetings: Not on file   • Marital Status: Not on file   Intimate Partner Violence:    • Fear of Current or Ex-Partner: Not on file   • Emotionally Abused: Not on file   • Physically Abused: Not on file   • Sexually Abused: Not on file   Housing Stability:    • Unable to Pay for Housing in the Last Year: Not on file   • Number of Places Lived in the Last Year: Not on file   • Unstable Housing in the Last Year: Not on file      Allergies and Medications     Allergies   Allergen Reactions   • Bee Sting [Bee Venom Protein (Honey Bee)] Anaphylaxis   • Honey    • Penicillin G Anaphylaxis   • Penicillins Anaphylaxis     Other reaction(s): Anaphylaxis   • Shellfish Derived Hives   • Sulfa (Sulfonamide Antibiotics) Anaphylaxis   • Ceftin [Cefuroxime Axetil] Other (see comments)     Per patient bone pain   • Ciprofibrate    • Ciprofloxacin      Other reaction(s): Muscular pain         Current Outpatient Medications:   •  Bifidobacterium infantis (DIGESTIVE PROBIOTIC ORAL), Take by mouth. Triphala, Disp: , Rfl:   •  CALCIUM ORAL, Take by mouth. Natural Vitality Calm and Calcium 1 tsp BID, Disp: , Rfl:   •  cholecalciferol, vitamin D3, 5,000 unit (125 mcg) capsule, Take 5,000 Units by mouth daily., Disp: , Rfl:   •  MAGNESIUM ORAL, Take by mouth., Disp: , Rfl:   •  nicotine polacrilex (COMMIT) 4 mg lozenge, Apply 4 mg to cheek See admin instr. q 5 hours PRN , Disp: , Rfl:   •  RESVERATROL ORAL, Take by mouth. Trunature, 75 mg,  "Disp: , Rfl:   •  tretinoin (RETIN-A TOP), Apply topically., Disp: , Rfl:   •  cyanocobalamin, vitamin B-12, (VITAMIN B-12 ORAL), Take 5,000 mcg by mouth. Vitamin shoppe, Disp: , Rfl:   •  HERBAL DRUGS ORAL, Take by mouth. Berberine 500 mg BID, Disp: , Rfl:   •  OREGANO OIL ORAL, Take by mouth., Disp: , Rfl:   •  quercetin, 500 mg. Solaray, Disp: , Rfl:    Review of Systems       Review of Systems   Physical Examination       Objective     Vitals:    09/29/21 1433   BP: 132/72   Pulse: 82   Resp: 16   Temp: 36.7 °C (98.1 °F)   SpO2: 97%       Vitals:    09/29/21 1433   BP: 132/72   BP Location: Left upper arm   Patient Position: Sitting   Pulse: 82   Resp: 16   Temp: 36.7 °C (98.1 °F)   TempSrc: Temporal   SpO2: 97%   Weight: 78.9 kg (174 lb)   Height: 1.626 m (5' 4\")       Wt Readings from Last 3 Encounters:   09/29/21 78.9 kg (174 lb)   08/02/21 77.1 kg (170 lb)   08/02/21 77.1 kg (170 lb)       Ht Readings from Last 3 Encounters:   09/29/21 1.626 m (5' 4\")   08/02/21 1.626 m (5' 4\")   08/02/21 1.626 m (5' 4\")       BP Readings from Last 3 Encounters:   09/29/21 132/72   08/02/21 120/80   12/07/20 118/72       Physical Exam   Laboratory Results     Lab Results   Component Value Date    WBC 4.64 09/12/2020    HGB 15.0 09/12/2020    HCT 46.1 (H) 09/12/2020    MCV 95.2 09/12/2020     09/12/2020          Chemistry        Component Value Date/Time     02/18/2021 0823     09/12/2020 0906    K 4.5 02/18/2021 0823    K 5.1 09/12/2020 0906     02/18/2021 0823     09/12/2020 0906    CO2 29 02/18/2021 0823    CO2 27 09/12/2020 0906    BUN 13 02/18/2021 0823    BUN 8 09/12/2020 0906    CREATININE 0.84 02/18/2021 0823    CREATININE 0.7 09/12/2020 0906        Component Value Date/Time    CALCIUM 9.5 02/18/2021 0823    CALCIUM 9.5 09/12/2020 0906    ALKPHOS 87 02/18/2021 0823    ALKPHOS 69 09/12/2020 0906    AST 27 02/18/2021 0823    AST 26 09/12/2020 0906    ALT 30 (H) 02/18/2021 0823    ALT 28 " 09/12/2020 0906    BILITOT 1.0 02/18/2021 0823    BILITOT 1.0 09/12/2020 0906            Lab Results   Component Value Date    GLUCOSE 77 02/18/2021    CALCIUM 9.5 02/18/2021     02/18/2021    K 4.5 02/18/2021    CO2 29 02/18/2021     02/18/2021    BUN 13 02/18/2021    CREATININE 0.84 02/18/2021       Lab Results   Component Value Date    CHOL 223 (H) 09/12/2020    CHOL 234 (H) 05/29/2020    CHOL 222 (H) 11/20/2019     Lab Results   Component Value Date    HDL 47 (L) 09/12/2020    HDL 51 (L) 05/29/2020    HDL 48 (L) 11/20/2019     Lab Results   Component Value Date    LDLCALC 152 (H) 09/12/2020    LDLCALC 163 (H) 05/29/2020    LDLCALC 150 (H) 11/20/2019     Lab Results   Component Value Date    TRIG 118 09/12/2020    TRIG 99 05/29/2020    TRIG 122 11/20/2019     No results found for: CHOLHDL    Lab Results   Component Value Date    TSH 1.67 09/17/2020       No results found for: HGBA1C    No results found for: HEPCAB        There is no immunization history on file for this patient.       Assessment and Plan       Assessment/Plan     Problem List Items Addressed This Visit        Nervous    Chronic right lower quadrant pain    Current Assessment & Plan     Discussed at length today with pt  ---await labs and imaging to be done         Relevant Orders    CBC and differential    CT ABDOMEN PELVIS WITHOUT IV CONTRAST    Basic metabolic panel    Stool culture    Ova and parasite screen       Digestive    Vitamin D deficiency    Current Assessment & Plan     Will check D level         Relevant Orders    Vitamin D 25 hydroxy    B12 deficiency    Current Assessment & Plan     --has complaints of vit b12 def         Relevant Orders    Vitamin B12       Other    Abdominal bloating - Primary    Current Assessment & Plan     Pt has complaints of increased abdominal bloating--reviewed old records in epic/ labs and prior imaging that was done.      Recommend for pt to complete ct of abdomen/pelvis    Can follow up  with GI in future      Will complete stool cultures               No follow-ups on file.    Orders Placed This Encounter   Procedures   • Stool culture     Standing Status:   Future     Number of Occurrences:   1     Standing Expiration Date:   9/29/2022     Order Specific Question:   Release to patient     Answer:   Immediate   • Ova and parasite screen     Standing Status:   Future     Number of Occurrences:   1     Standing Expiration Date:   9/29/2022     Order Specific Question:   Release to patient     Answer:   Immediate   • CT ABDOMEN PELVIS WITHOUT IV CONTRAST     Standing Status:   Future     Standing Expiration Date:   9/29/2022     Order Specific Question:   Should the patient receive oral contrast?     Answer:   Yes     Order Specific Question:   Release to patient     Answer:   Immediate   • Vitamin D 25 hydroxy     Standing Status:   Future     Number of Occurrences:   1     Standing Expiration Date:   9/29/2022     Order Specific Question:   Release to patient     Answer:   Immediate   • Vitamin B12     Standing Status:   Future     Number of Occurrences:   1     Standing Expiration Date:   9/29/2022     Order Specific Question:   Release to patient     Answer:   Immediate   • CBC and differential     Standing Status:   Future     Number of Occurrences:   1     Standing Expiration Date:   9/29/2022     Order Specific Question:   Release to patient     Answer:   Immediate   • Basic metabolic panel     Standing Status:   Future     Number of Occurrences:   1     Standing Expiration Date:   9/29/2022     Order Specific Question:   Release to patient     Answer:   Immediate          Tamara Valdivia DO  10/17/2021

## 2021-09-30 ENCOUNTER — TELEPHONE (OUTPATIENT)
Dept: PRIMARY CARE | Facility: CLINIC | Age: 66
End: 2021-09-30

## 2021-09-30 NOTE — TELEPHONE ENCOUNTER
[facility] for Abdomen/Pelvis was submitted and [facility]    Authorization #[id number]  Valid 09/30/2021 to 03/29/2022  Location [facility]

## 2021-10-05 ENCOUNTER — APPOINTMENT (OUTPATIENT)
Dept: RADIOLOGY | Age: 66
End: 2021-10-05
Payer: COMMERCIAL

## 2021-10-17 PROBLEM — E53.8 B12 DEFICIENCY: Status: ACTIVE | Noted: 2021-10-17

## 2021-10-17 PROBLEM — G89.29 CHRONIC RIGHT LOWER QUADRANT PAIN: Status: ACTIVE | Noted: 2021-10-17

## 2021-10-17 PROBLEM — R10.31 CHRONIC RIGHT LOWER QUADRANT PAIN: Status: ACTIVE | Noted: 2021-10-17

## 2021-10-17 PROBLEM — E55.9 VITAMIN D DEFICIENCY: Status: ACTIVE | Noted: 2021-10-17

## 2021-10-17 NOTE — ASSESSMENT & PLAN NOTE
Pt has complaints of increased abdominal bloating--reviewed old records in epic/ labs and prior imaging that was done.      Recommend for pt to complete ct of abdomen/pelvis    Can follow up with GI in future      Will complete stool cultures

## 2021-10-28 LAB
C JEJUNI+C COLI AG STL QL: NORMAL
E COLI SXT STL QL IA: NORMAL
O+P STL TRI STN: NORMAL
SALM + SHIG STL CULT: NORMAL

## 2021-11-04 DIAGNOSIS — K62.89 CHRONIC IDIOPATHIC ANAL PAIN: ICD-10-CM

## 2021-11-04 DIAGNOSIS — G89.29 CHRONIC IDIOPATHIC ANAL PAIN: ICD-10-CM

## 2021-11-04 DIAGNOSIS — R10.31 ABDOMINAL PAIN, RIGHT LOWER QUADRANT: Primary | ICD-10-CM

## 2021-11-29 LAB — COVID-19 AMBULATORY: DETECTED

## 2021-11-30 ENCOUNTER — TELEPHONE (OUTPATIENT)
Dept: PRIMARY CARE | Facility: CLINIC | Age: 66
End: 2021-11-30
Payer: COMMERCIAL

## 2021-11-30 ENCOUNTER — TELEPHONE (OUTPATIENT)
Dept: PRIMARY CARE | Facility: CLINIC | Age: 66
End: 2021-11-30

## 2021-11-30 ENCOUNTER — TELEPHONE (OUTPATIENT)
Dept: INFECTIOUS DISEASES | Facility: HOSPITAL | Age: 66
End: 2021-11-30

## 2021-11-30 NOTE — TELEPHONE ENCOUNTER
Richie with [facility] antibody treatment would like to confirm the day mario started having covid symptoms. Because treatment would have to being with in 10 days of covid symptoms . [facility] contact number is

## 2021-11-30 NOTE — TELEPHONE ENCOUNTER
Chris with Richie, not sure if there is a way to get it to her another way? It does not have a last name

## 2021-12-16 ENCOUNTER — TELEPHONE (OUTPATIENT)
Dept: PRIMARY CARE | Facility: CLINIC | Age: 66
End: 2021-12-16
Payer: COMMERCIAL

## 2021-12-16 NOTE — TELEPHONE ENCOUNTER
I sent a note to Kristen asking if we do D-Dimer testing. Kristen wrote back and said the appointment has to be 30 minutes not 15 minutes. And the patient also wanted to get an EKG done Kristen said that is up to the doctor. The reason why she wants the tests done is because she had covid.    Patient did a home covid test on 11/22/21 and she was positive then she had another one done on 11/27/21 and she was positive. I left the appointment that the patient schedule alone until I spoke with Jael.

## 2021-12-17 ENCOUNTER — APPOINTMENT (OUTPATIENT)
Dept: LAB | Age: 66
End: 2021-12-17
Attending: FAMILY MEDICINE
Payer: COMMERCIAL

## 2021-12-17 DIAGNOSIS — E53.8 DEFICIENCY OF OTHER SPECIFIED B GROUP VITAMINS: ICD-10-CM

## 2021-12-17 DIAGNOSIS — R10.31 RIGHT LOWER QUADRANT PAIN: ICD-10-CM

## 2021-12-17 DIAGNOSIS — G89.29 OTHER CHRONIC PAIN: ICD-10-CM

## 2021-12-17 DIAGNOSIS — E55.9 VITAMIN D DEFICIENCY, UNSPECIFIED: ICD-10-CM

## 2021-12-17 PROCEDURE — 30099997 SPECIMEN PROCESSING

## 2021-12-18 LAB
25(OH)D3 SERPL-MCNC: 34 NG/ML (ref 30–100)
BASOPHILS # BLD AUTO: 60 CELLS/UL (ref 0–200)
BASOPHILS NFR BLD AUTO: 1.2 %
BUN SERPL-MCNC: 9 MG/DL (ref 7–25)
BUN/CREAT SERPL: NORMAL (CALC) (ref 6–22)
CALCIUM SERPL-MCNC: 9.1 MG/DL (ref 8.6–10.4)
CHLORIDE SERPL-SCNC: 103 MMOL/L (ref 98–110)
CO2 SERPL-SCNC: 29 MMOL/L (ref 20–32)
CREAT SERPL-MCNC: 0.77 MG/DL (ref 0.5–0.99)
EOSINOPHIL # BLD AUTO: 350 CELLS/UL (ref 15–500)
EOSINOPHIL NFR BLD AUTO: 7 %
ERYTHROCYTE [DISTWIDTH] IN BLOOD BY AUTOMATED COUNT: 12.4 % (ref 11–15)
GLUCOSE SERPL-MCNC: 84 MG/DL (ref 65–99)
HCT VFR BLD AUTO: 41.8 % (ref 35–45)
HGB BLD-MCNC: 13.7 G/DL (ref 11.7–15.5)
LYMPHOCYTES # BLD AUTO: 1635 CELLS/UL (ref 850–3900)
LYMPHOCYTES NFR BLD AUTO: 32.7 %
MCH RBC QN AUTO: 30.6 PG (ref 27–33)
MCHC RBC AUTO-ENTMCNC: 32.8 G/DL (ref 32–36)
MCV RBC AUTO: 93.5 FL (ref 80–100)
MONOCYTES # BLD AUTO: 500 CELLS/UL (ref 200–950)
MONOCYTES NFR BLD AUTO: 10 %
NEUTROPHILS # BLD AUTO: 2455 CELLS/UL (ref 1500–7800)
NEUTROPHILS NFR BLD AUTO: 49.1 %
PLATELET # BLD AUTO: 299 THOUSAND/UL (ref 140–400)
PMV BLD REES-ECKER: 10.5 FL (ref 7.5–12.5)
POTASSIUM SERPL-SCNC: 4.4 MMOL/L (ref 3.5–5.3)
QUEST EGFR AFRICAN AMERICAN: 95 ML/MIN/1.73M2
QUEST EGFR NON-AFR. AMERICAN: 82 ML/MIN/1.73M2
RBC # BLD AUTO: 4.47 MILLION/UL (ref 3.8–5.1)
SODIUM SERPL-SCNC: 138 MMOL/L (ref 135–146)
VIT B12 SERPL-MCNC: 667 PG/ML (ref 200–1100)
WBC # BLD AUTO: 5 THOUSAND/UL (ref 3.8–10.8)

## 2021-12-19 ENCOUNTER — HOSPITAL ENCOUNTER (EMERGENCY)
Facility: HOSPITAL | Age: 66
Discharge: HOME | End: 2021-12-19
Attending: EMERGENCY MEDICINE
Payer: COMMERCIAL

## 2021-12-19 ENCOUNTER — APPOINTMENT (EMERGENCY)
Dept: RADIOLOGY | Facility: HOSPITAL | Age: 66
End: 2021-12-19
Attending: EMERGENCY MEDICINE
Payer: COMMERCIAL

## 2021-12-19 VITALS
HEIGHT: 65 IN | SYSTOLIC BLOOD PRESSURE: 144 MMHG | OXYGEN SATURATION: 100 % | DIASTOLIC BLOOD PRESSURE: 80 MMHG | WEIGHT: 175.9 LBS | RESPIRATION RATE: 20 BRPM | TEMPERATURE: 98.1 F | BODY MASS INDEX: 29.31 KG/M2 | HEART RATE: 78 BPM

## 2021-12-19 DIAGNOSIS — R60.9 DEPENDENT EDEMA: Primary | ICD-10-CM

## 2021-12-19 LAB
ALBUMIN SERPL-MCNC: 4.5 G/DL (ref 3.4–5)
ALP SERPL-CCNC: 65 IU/L (ref 35–126)
ALT SERPL-CCNC: 28 IU/L (ref 11–54)
ANION GAP SERPL CALC-SCNC: 10 MEQ/L (ref 3–15)
AST SERPL-CCNC: 29 IU/L (ref 15–41)
BACTERIA URNS QL MICRO: NORMAL /HPF
BASOPHILS # BLD: 0.04 K/UL (ref 0.01–0.1)
BASOPHILS NFR BLD: 0.7 %
BILIRUB SERPL-MCNC: 1.1 MG/DL (ref 0.3–1.2)
BILIRUB UR QL STRIP.AUTO: NEGATIVE MG/DL
BNP SERPL-MCNC: 42 PG/ML
BUN SERPL-MCNC: 13 MG/DL (ref 8–20)
CALCIUM SERPL-MCNC: 9.2 MG/DL (ref 8.9–10.3)
CHLORIDE SERPL-SCNC: 100 MEQ/L (ref 98–109)
CLARITY UR REFRACT.AUTO: CLEAR
CO2 SERPL-SCNC: 27 MEQ/L (ref 22–32)
COLOR UR AUTO: YELLOW
CREAT SERPL-MCNC: 0.6 MG/DL (ref 0.6–1.1)
DIFFERENTIAL METHOD BLD: ABNORMAL
EOSINOPHIL # BLD: 0.41 K/UL (ref 0.04–0.36)
EOSINOPHIL NFR BLD: 6.8 %
ERYTHROCYTE [DISTWIDTH] IN BLOOD BY AUTOMATED COUNT: 12.2 % (ref 11.7–14.4)
GFR SERPL CREATININE-BSD FRML MDRD: >60 ML/MIN/1.73M*2
GLUCOSE SERPL-MCNC: 98 MG/DL (ref 70–99)
GLUCOSE UR STRIP.AUTO-MCNC: NEGATIVE MG/DL
HCT VFR BLDCO AUTO: 41.9 % (ref 35–45)
HGB BLD-MCNC: 13.7 G/DL (ref 11.8–15.7)
HGB UR QL STRIP.AUTO: NEGATIVE
HYALINE CASTS #/AREA URNS LPF: NORMAL /LPF
IMM GRANULOCYTES # BLD AUTO: 0.02 K/UL (ref 0–0.08)
IMM GRANULOCYTES NFR BLD AUTO: 0.3 %
KETONES UR STRIP.AUTO-MCNC: NEGATIVE MG/DL
LEUKOCYTE ESTERASE UR QL STRIP.AUTO: 1
LYMPHOCYTES # BLD: 1.8 K/UL (ref 1.2–3.5)
LYMPHOCYTES NFR BLD: 29.8 %
MCH RBC QN AUTO: 30.1 PG (ref 28–33.2)
MCHC RBC AUTO-ENTMCNC: 32.7 G/DL (ref 32.2–35.5)
MCV RBC AUTO: 92.1 FL (ref 83–98)
MONOCYTES # BLD: 0.61 K/UL (ref 0.28–0.8)
MONOCYTES NFR BLD: 10.1 %
NEUTROPHILS # BLD: 3.16 K/UL (ref 1.7–7)
NEUTS SEG NFR BLD: 52.3 %
NITRITE UR QL STRIP.AUTO: NEGATIVE
NRBC BLD-RTO: 0 %
PDW BLD AUTO: 8.7 FL (ref 9.4–12.3)
PH UR STRIP.AUTO: 7 [PH]
PLATELET # BLD AUTO: 268 K/UL (ref 150–369)
POTASSIUM SERPL-SCNC: 3.5 MEQ/L (ref 3.6–5.1)
PROT SERPL-MCNC: 7.7 G/DL (ref 6–8.2)
PROT UR QL STRIP.AUTO: NEGATIVE
RBC # BLD AUTO: 4.55 M/UL (ref 3.93–5.22)
RBC #/AREA URNS HPF: NORMAL /HPF
SODIUM SERPL-SCNC: 137 MEQ/L (ref 136–144)
SP GR UR REFRACT.AUTO: <=1.005
SQUAMOUS URNS QL MICRO: NORMAL /HPF
UROBILINOGEN UR STRIP-ACNC: 0.2 EU/DL
WBC # BLD AUTO: 6.04 K/UL (ref 3.8–10.5)
WBC #/AREA URNS HPF: NORMAL /HPF

## 2021-12-19 PROCEDURE — 85025 COMPLETE CBC W/AUTO DIFF WBC: CPT | Performed by: EMERGENCY MEDICINE

## 2021-12-19 PROCEDURE — 81001 URINALYSIS AUTO W/SCOPE: CPT | Performed by: EMERGENCY MEDICINE

## 2021-12-19 PROCEDURE — 99284 EMERGENCY DEPT VISIT MOD MDM: CPT | Mod: 25

## 2021-12-19 PROCEDURE — 93970 EXTREMITY STUDY: CPT

## 2021-12-19 PROCEDURE — 87086 URINE CULTURE/COLONY COUNT: CPT | Performed by: EMERGENCY MEDICINE

## 2021-12-19 PROCEDURE — 80053 COMPREHEN METABOLIC PANEL: CPT | Performed by: EMERGENCY MEDICINE

## 2021-12-19 PROCEDURE — 36415 COLL VENOUS BLD VENIPUNCTURE: CPT | Performed by: EMERGENCY MEDICINE

## 2021-12-19 PROCEDURE — 99283 EMERGENCY DEPT VISIT LOW MDM: CPT | Mod: 25

## 2021-12-19 PROCEDURE — 83880 ASSAY OF NATRIURETIC PEPTIDE: CPT | Performed by: EMERGENCY MEDICINE

## 2021-12-19 RX ORDER — HYDROCHLOROTHIAZIDE 25 MG/1
25 TABLET ORAL DAILY
Qty: 20 TABLET | Refills: 0 | Status: SHIPPED | OUTPATIENT
Start: 2021-12-19 | End: 2022-01-06

## 2021-12-19 ASSESSMENT — ENCOUNTER SYMPTOMS
DIARRHEA: 0
PALPITATIONS: 0
ABDOMINAL PAIN: 0
NAUSEA: 0
SHORTNESS OF BREATH: 0
CHEST TIGHTNESS: 0
SPEECH DIFFICULTY: 0
CHILLS: 0
BRUISES/BLEEDS EASILY: 0
COUGH: 0
SORE THROAT: 0
RHINORRHEA: 0
BACK PAIN: 0
WEAKNESS: 0
LEG PAIN: 1
FEVER: 0
HEMATURIA: 0
VOMITING: 0
ARTHRALGIAS: 0
FREQUENCY: 0
COLOR CHANGE: 0
DYSURIA: 0
NUMBNESS: 0
EYE PAIN: 0

## 2021-12-19 NOTE — ED PROVIDER NOTES
Emergency Medicine Note  HPI   HISTORY OF PRESENT ILLNESS       History provided by:  Patient   used: No    Leg Pain  Location:  Leg  Injury: no    Leg location:  L lower leg and R lower leg  Pain details:     Quality:  Aching, cramping and pressure    Radiates to:  Does not radiate    Severity:  Moderate    Onset quality:  Gradual    Timing:  Intermittent    Progression:  Waxing and waning  Chronicity:  Recurrent  Dislocation: no    Prior injury to area:  No  Relieved by:  Nothing  Worsened by:  Nothing  Ineffective treatments:  Elevation, compression, immobilization and rest  Associated symptoms: swelling    Associated symptoms: no back pain and no fever          Patient History   PAST HISTORY     Reviewed from Nursing Triage:       Past Medical History:   Diagnosis Date   • Cancer (CMS/[facility])     skin cancer-basal   • Celiac disease    • Chemical sensitivity    • COPD (chronic obstructive pulmonary disease) (CMS/[facility])    • Depression    • Environmental allergies    • Female infertility    • H/O gastroesophageal reflux (GERD)    • High cholesterol    • History of bronchitis    • History of sinusitis    • History of skin cancer    • History of traumatic head injury    • Hypertension    • Nasal sinus polyp    • Osteoarthritis    • Osteopenia    • Psoriasis    • Sleep apnea        Past Surgical History:   Procedure Laterality Date   • APPENDECTOMY     • APPENDECTOMY  1970   • COLONOSCOPY  07/2019   • HYSTERECTOMY  1995   • OOPHORECTOMY     • TONSILLECTOMY     • TONSILLECTOMY  1964   • UPPER GASTROINTESTINAL ENDOSCOPY  07/2019   • WISDOM TOOTH EXTRACTION  1976       Family History   Problem Relation Age of Onset   • Hypertension Biological Mother    • Obesity Biological Mother    • Kidney disease Biological Mother    • Hypertension Biological Father    • Obesity Biological Father    • Heart disease Biological Father    • Diabetes Biological Father    • Arthritis Biological Father    • Thyroid cancer  Biological Sister    • Cancer Biological Sister    • ADD / ADHD Biological Sister    • Hypertension Biological Brother    • Eczema Biological Brother    • Heart disease Maternal Grandmother    • Hypertension Maternal Grandmother    • Obesity Maternal Grandmother    • Depression Maternal Grandmother    • Hypertension Maternal Grandfather    • Heart disease Maternal Grandfather    • Stroke Maternal Grandfather    • Dementia Paternal Grandmother    • Diabetes Paternal Grandfather    • Cancer Paternal Grandfather        Social History     Tobacco Use   • Smoking status: Former Smoker     Packs/day: 1.00     Years: 48.00     Pack years: 48.00     Types: Cigarettes     Start date: 1968     Quit date: 2016     Years since quittin.0   • Smokeless tobacco: Never Used   • Tobacco comment: Uses lozenges   Vaping Use   • Vaping Use: Never used   Substance Use Topics   • Alcohol use: Not Currently   • Drug use: Never         Review of Systems   REVIEW OF SYSTEMS     Review of Systems   Constitutional: Negative for chills and fever.   HENT: Negative for ear pain, rhinorrhea and sore throat.    Eyes: Negative for pain and visual disturbance.   Respiratory: Negative for cough, chest tightness and shortness of breath.    Cardiovascular: Negative for chest pain and palpitations.   Gastrointestinal: Negative for abdominal pain, diarrhea, nausea and vomiting.   Genitourinary: Negative for dysuria, frequency, hematuria, urgency, vaginal bleeding and vaginal discharge.   Musculoskeletal: Negative for arthralgias and back pain.   Skin: Negative for color change and rash.   Neurological: Negative for speech difficulty, weakness and numbness.   Hematological: Does not bruise/bleed easily.         VITALS     ED Vitals    Date/Time Temp Pulse Resp BP SpO2 [facility]   21 0222 36.7 °C (98.1 °F) 91 16 160/90 99 % CLK        Pulse Ox %: 99 % (21 0254)  Pulse Ox Interpretation: Normal (21 0254)  Heart Rate: 91 (21  0254)  Rhythm Strip Interpretation: Normal Sinus Rhythm (12/19/21 0254)     Physical Exam   PHYSICAL EXAM     Physical Exam  Vitals and nursing note reviewed.   Constitutional:       General: She is not in acute distress.     Appearance: She is well-developed.   HENT:      Head: Normocephalic and atraumatic.   Eyes:      Conjunctiva/sclera: Conjunctivae normal.   Neck:      Trachea: No tracheal deviation.   Cardiovascular:      Rate and Rhythm: Normal rate and regular rhythm.      Heart sounds: Normal heart sounds. No murmur heard.  Pulmonary:      Effort: Pulmonary effort is normal. No respiratory distress.      Breath sounds: Normal breath sounds.   Abdominal:      Palpations: Abdomen is soft.      Tenderness: There is no abdominal tenderness. There is no guarding or rebound.   Musculoskeletal:         General: Swelling and tenderness present. Normal range of motion.      Cervical back: Neck supple.   Skin:     General: Skin is warm and dry.   Neurological:      General: No focal deficit present.      Mental Status: She is alert.      GCS: GCS eye subscore is 4. GCS verbal subscore is 5. GCS motor subscore is 6.           PROCEDURES     Procedures     DATA     Results     Procedure Component Value Units Date/Time    B-type natriuretic peptide [[id number]]  (Normal) Collected: 12/19/21 0343    Specimen: Blood, Venous Updated: 12/19/21 0411     BNP 42 pg/mL     Comprehensive metabolic panel [[id number]]  (Abnormal) Collected: 12/19/21 0343    Specimen: Blood, Venous Updated: 12/19/21 0404     Sodium 137 mEQ/L      Potassium 3.5 mEQ/L      Comment: Results obtained on plasma. Plasma Potassium values may be up to 0.4 mEQ/L less than serum values. The differences may be greater for patients with high platelet or white cell counts.        Chloride 100 mEQ/L      CO2 27 mEQ/L      BUN 13 mg/dL      Creatinine 0.6 mg/dL      Glucose 98 mg/dL      Calcium 9.2 mg/dL      AST (SGOT) 29 IU/L      ALT (SGPT) 28 IU/L       Alkaline Phosphatase 65 IU/L      Total Protein 7.7 g/dL      Comment: Test performed on plasma which typically contains approximately 0.4 g/dL more protein than serum.        Albumin 4.5 g/dL      Bilirubin, Total 1.1 mg/dL      eGFR >60.0 mL/min/1.73m*2      Anion Gap 10 mEQ/L     UA with reflex culture [[id number]]  (Abnormal) Collected: 12/19/21 0338    Specimen: Urine, Clean Catch Updated: 12/19/21 0354    Narrative:      The following orders were created for panel order UA with reflex culture.  Procedure                               Abnormality         Status                     ---------                               -----------         ------                     UA Reflex to Culture (Ma...[[id number]]  Abnormal            Final result               UA Microscopic[[id number]]               Normal              Final result                 Please view results for these tests on the individual orders.    UA Microscopic [[id number]]  (Normal) Collected: 12/19/21 0338    Specimen: Urine, Clean Catch Updated: 12/19/21 0354     RBC, Urine 0 TO 4 /HPF      WBC, Urine 0 TO 3 /HPF      Squamous Epithelial None Seen /hpf      Hyaline Cast None Seen /lpf      Bacteria, Urine None Seen /HPF     UA Reflex to Culture (Macroscopic) [[id number]]  (Abnormal) Collected: 12/19/21 0338    Specimen: Urine, Clean Catch Updated: 12/19/21 0352     Color, Urine Yellow     Clarity, Urine Clear     Specific Gravity, Urine <=1.005     pH, Urine 7.0     Leukocyte Esterase +1     Nitrite, Urine Negative     Protein, Urine Negative     Glucose, Urine Negative mg/dL      Ketones, Urine Negative mg/dL      Urobilinogen, Urine 0.2 EU/dL      Bilirubin, Urine Negative mg/dL      Blood, Urine Negative     Comment: The sensitivity of the occult blood test is equivalent to approximately 4 intact RBC/HPF.       CBC and differential [[id number]]  (Abnormal) Collected: 12/19/21 0343    Specimen: Blood, Venous Updated: 12/19/21 0350     WBC 6.04 K/uL       RBC 4.55 M/uL      Hemoglobin 13.7 g/dL      Hematocrit 41.9 %      MCV 92.1 fL      MCH 30.1 pg      MCHC 32.7 g/dL      RDW 12.2 %      Platelets 268 K/uL      MPV 8.7 fL      Differential Type Auto     nRBC 0.0 %      Immature Granulocytes 0.3 %      Neutrophils 52.3 %      Lymphocytes 29.8 %      Monocytes 10.1 %      Eosinophils 6.8 %      Basophils 0.7 %      Immature Granulocytes, Absolute 0.02 K/uL      Neutrophils, Absolute 3.16 K/uL      Lymphocytes, Absolute 1.80 K/uL      Monocytes, Absolute 0.61 K/uL      Eosinophils, Absolute 0.41 K/uL      Basophils, Absolute 0.04 K/uL           Imaging Results          US venous leg bilateral (Preliminary result)  Result time 12/19/21 05:07:58    ED Interpretation        [facility]  Teleradiology Cases  This communication is confidential. The information contained herein is protected from unauthorized use by privilege or law. Copying, distribution, disclosure, or other use of this information is prohibited. You are required to destroy this communication if you have received it in error.      Patient Name: Adeola Rees  Exam(s): LLE doppler  RLE doppler  MR#: [id number]    History: bilateral calf pain, foot pain    Preliminary Results:    No evidence of DVT on the images provided.      Interpreted by: Eran Gomez MD, Dec 19, 2021 05:02 AM    Adeola Rees [id number], Dec 19, 2021                              No orders to display       Scoring tools                                 ED Course & MDM   MDM / ED COURSE and CLINICAL IMPRESSIONS     MDM  Number of Diagnoses or Management Options  Dependent edema  Diagnosis management comments: 66-year-old female presents complaining of bilateral lower extremity edema for 1 week.  Not improving with normal treatment and elevation.  Concerned she had a blood clot in her legs.  Work-up included labs were normal.  Ultrasound of bilateral lower extremities was negative for DVT.  Patient with mild dependent  edema.  Will start hydrochlorothiazide discharge home follow-up with PCP as an outpatient.       Amount and/or Complexity of Data Reviewed  Clinical lab tests: reviewed  Independent visualization of images, tracings, or specimens: yes        Clinical Impressions as of 12/19/21 3203   Dependent edema            BETTY Tim MD  12/19/21 3706

## 2021-12-20 ENCOUNTER — OFFICE VISIT (OUTPATIENT)
Dept: PRIMARY CARE | Facility: CLINIC | Age: 66
End: 2021-12-20
Payer: COMMERCIAL

## 2021-12-20 VITALS
DIASTOLIC BLOOD PRESSURE: 78 MMHG | RESPIRATION RATE: 16 BRPM | BODY MASS INDEX: 28.82 KG/M2 | HEART RATE: 96 BPM | OXYGEN SATURATION: 99 % | HEIGHT: 65 IN | SYSTOLIC BLOOD PRESSURE: 128 MMHG | WEIGHT: 173 LBS | TEMPERATURE: 97.8 F

## 2021-12-20 DIAGNOSIS — R60.0 LOCALIZED EDEMA: Primary | ICD-10-CM

## 2021-12-20 DIAGNOSIS — E55.9 VITAMIN D DEFICIENCY: ICD-10-CM

## 2021-12-20 LAB — BACTERIA UR CULT: NORMAL

## 2021-12-20 PROCEDURE — 3074F SYST BP LT 130 MM HG: CPT | Performed by: FAMILY MEDICINE

## 2021-12-20 PROCEDURE — 3008F BODY MASS INDEX DOCD: CPT | Performed by: FAMILY MEDICINE

## 2021-12-20 PROCEDURE — 99213 OFFICE O/P EST LOW 20 MIN: CPT | Performed by: FAMILY MEDICINE

## 2021-12-20 PROCEDURE — 3078F DIAST BP <80 MM HG: CPT | Performed by: FAMILY MEDICINE

## 2021-12-20 NOTE — PROGRESS NOTES
Tamara Valdivia D.O.   [facility] in [address], [address]  [place] [address]  [phone]  Fax [phone]          History of Present Illness   Patient ID: Adeola Rees is a 66 y.o. female.    Subjective   Bilateral edema    HPI  Edema and had more swelling in her legs,  Pt was seen in the ED and and labs and ultrasound were complete.   Ultrasound was negative for clot.     ua showed leuks but pt has no symptoms.     Pt taking aot of otc supplements ---  Nac/ kyrlic/ tuddca and black seed oil---    Also--here to review of labs       Past Medical/Surgical/Family/Social History     The following have been reviewed and updated as appropriate in this visit:   Tobacco         Past Medical History:   Diagnosis Date   • Cancer (CMS/[facility])     skin cancer-basal   • Celiac disease    • Chemical sensitivity    • COPD (chronic obstructive pulmonary disease) (CMS/[facility])    • Depression    • Environmental allergies    • Female infertility    • H/O gastroesophageal reflux (GERD)    • High cholesterol    • History of bronchitis    • History of sinusitis    • History of skin cancer    • History of traumatic head injury    • Hypertension    • Nasal sinus polyp    • Osteoarthritis    • Osteopenia    • Psoriasis    • Sleep apnea        Past Surgical History:   Procedure Laterality Date   • APPENDECTOMY     • APPENDECTOMY  1970   • COLONOSCOPY  07/2019   • HYSTERECTOMY  1995   • OOPHORECTOMY     • TONSILLECTOMY     • TONSILLECTOMY  1964   • UPPER GASTROINTESTINAL ENDOSCOPY  07/2019   • WISDOM TOOTH EXTRACTION  1976       Family History   Problem Relation Age of Onset   • Hypertension Biological Mother    • Obesity Biological Mother    • Kidney disease Biological Mother    • Hypertension Biological Father    • Obesity Biological Father    • Heart disease Biological Father    • Diabetes Biological Father    • Arthritis Biological Father    • Thyroid cancer Biological Sister    • Cancer  Biological Sister    • ADD / ADHD Biological Sister    • Hypertension Biological Brother    • Eczema Biological Brother    • Heart disease Maternal Grandmother    • Hypertension Maternal Grandmother    • Obesity Maternal Grandmother    • Depression Maternal Grandmother    • Hypertension Maternal Grandfather    • Heart disease Maternal Grandfather    • Stroke Maternal Grandfather    • Dementia Paternal Grandmother    • Diabetes Paternal Grandfather    • Cancer Paternal Grandfather        Social History     Socioeconomic History   • Marital status:      Spouse name: Cristopher    • Number of children: 2   • Years of education: Not on file   • Highest education level: Not on file   Occupational History   • Occupation: homemaker   Tobacco Use   • Smoking status: Former Smoker     Packs/day: 1.00     Years: 48.00     Pack years: 48.00     Types: Cigarettes     Start date: 1968     Quit date: 2016     Years since quittin.0   • Smokeless tobacco: Never Used   • Tobacco comment: Uses lozenges   Vaping Use   • Vaping Use: Never used   Substance and Sexual Activity   • Alcohol use: Not Currently   • Drug use: Never   • Sexual activity: Not Currently   Other Topics Concern   • Not on file   Social History Narrative    Has two adopted children     Social Determinants of Health     Financial Resource Strain: Not on file   Food Insecurity: No Food Insecurity   • Worried About Running Out of Food in the Last Year: Never true   • Ran Out of Food in the Last Year: Never true   Transportation Needs: Not on file   Physical Activity: Not on file   Stress: Not on file   Social Connections: Not on file   Intimate Partner Violence: Not on file   Housing Stability: Not on file      Allergies and Medications     Allergies   Allergen Reactions   • Bee Sting [Bee Venom Protein (Honey Bee)] Anaphylaxis   • Honey    • Penicillin G Anaphylaxis   • Penicillins Anaphylaxis     Other reaction(s): Anaphylaxis   • Shellfish Derived  Hives   • Sulfa (Sulfonamide Antibiotics) Anaphylaxis   • Ceftin [Cefuroxime Axetil] Other (see comments)     Per patient bone pain   • Ciprofibrate    • Ciprofloxacin      Other reaction(s): Muscular pain         Current Outpatient Medications:   •  Bifidobacterium infantis (DIGESTIVE PROBIOTIC ORAL), Take by mouth. Triphala, Disp: , Rfl:   •  CALCIUM ORAL, Take by mouth. Natural Vitality Calm and Calcium 1 tsp BID, Disp: , Rfl:   •  hydrochlorothiazide 25 mg tablet, Take 1 tablet (25 mg total) by mouth daily., Disp: 20 tablet, Rfl: 0  •  MAGNESIUM ORAL, Take by mouth., Disp: , Rfl:   •  nicotine polacrilex (COMMIT) 4 mg lozenge, Apply 4 mg to cheek See admin instr. q 5 hours PRN , Disp: , Rfl:   •  OREGANO OIL ORAL, Take by mouth., Disp: , Rfl:   •  quercetin, 500 mg. Solaray, Disp: , Rfl:   •  RESVERATROL ORAL, Take by mouth. Trunature, 75 mg, Disp: , Rfl:   •  tretinoin (RETIN-A TOP), Apply topically., Disp: , Rfl:   •  cholecalciferol 1,250 mcg (50,000 unit) capsule, Take 1 capsule (50,000 Units total) by mouth once a week for 4 doses., Disp: 4 capsule, Rfl: 0  •  cholecalciferol, vitamin D3, 5,000 unit (125 mcg) capsule, Take 5,000 Units by mouth daily., Disp: , Rfl:   •  cyanocobalamin, vitamin B-12, (VITAMIN B-12 ORAL), Take 5,000 mcg by mouth. Vitamin shoppe, Disp: , Rfl:   •  HERBAL DRUGS ORAL, Take by mouth. Berberine 500 mg BID, Disp: , Rfl:    Review of Systems       Review of Systems   Constitutional: Negative for activity change, appetite change, chills, diaphoresis, fatigue and unexpected weight change.   HENT: Negative for congestion, hearing loss, postnasal drip, sinus pressure, sinus pain and voice change.    Eyes: Negative for pain, discharge, redness, itching and visual disturbance.   Respiratory: Negative for cough, chest tightness, shortness of breath and wheezing.    Cardiovascular: Positive for leg swelling. Negative for chest pain and palpitations.      Physical Examination       Objective  "    Vitals:    12/20/21 1715   BP: 128/78   Pulse:    Resp:    Temp:    SpO2:        Vitals:    12/20/21 1658 12/20/21 1715   BP: 122/84 128/78   BP Location: Left upper arm    Patient Position: Sitting    Pulse: 96    Resp: 16    Temp: 36.6 °C (97.8 °F)    TempSrc: Temporal    SpO2: 99%    Weight: 78.5 kg (173 lb)    Height: 1.651 m (5' 5\")        Wt Readings from Last 3 Encounters:   12/20/21 78.5 kg (173 lb)   12/19/21 79.8 kg (175 lb 14.4 oz)   09/29/21 78.9 kg (174 lb)       Ht Readings from Last 3 Encounters:   12/20/21 1.651 m (5' 5\")   12/19/21 1.651 m (5' 5\")   09/29/21 1.626 m (5' 4\")       BP Readings from Last 3 Encounters:   12/20/21 128/78   12/19/21 (!) 144/80   09/29/21 132/72       Physical Exam  Vitals and nursing note reviewed.   Constitutional:       Appearance: Normal appearance. She is well-developed.   HENT:      Head: Normocephalic and atraumatic.      Right Ear: External ear normal.      Left Ear: External ear normal.      Nose: Nose normal.   Eyes:      Conjunctiva/sclera: Conjunctivae normal.      Pupils: Pupils are equal, round, and reactive to light.   Neck:      Thyroid: No thyromegaly.   Cardiovascular:      Rate and Rhythm: Normal rate and regular rhythm.      Heart sounds: Normal heart sounds.   Pulmonary:      Effort: Pulmonary effort is normal.      Breath sounds: Normal breath sounds.   Musculoskeletal:         General: No swelling.      Cervical back: Normal range of motion and neck supple.      Right lower leg: No edema.      Left lower leg: No edema.   Lymphadenopathy:      Cervical: No cervical adenopathy.   Neurological:      Mental Status: She is alert.        Laboratory Results     Lab Results   Component Value Date    WBC 6.04 12/19/2021    HGB 13.7 12/19/2021    HCT 41.9 12/19/2021    MCV 92.1 12/19/2021     12/19/2021          Chemistry        Component Value Date/Time     12/19/2021 0343    K 3.5 (L) 12/19/2021 0343     12/19/2021 0343    CO2 27 " 12/19/2021 0343    BUN 13 12/19/2021 0343    CREATININE 0.6 12/19/2021 0343        Component Value Date/Time    CALCIUM 9.2 12/19/2021 0343    ALKPHOS 65 12/19/2021 0343    AST 29 12/19/2021 0343    ALT 28 12/19/2021 0343    BILITOT 1.1 12/19/2021 0343            Lab Results   Component Value Date    GLUCOSE 98 12/19/2021    CALCIUM 9.2 12/19/2021     12/19/2021    K 3.5 (L) 12/19/2021    CO2 27 12/19/2021     12/19/2021    BUN 13 12/19/2021    CREATININE 0.6 12/19/2021       Lab Results   Component Value Date    CHOL 223 (H) 09/12/2020    CHOL 234 (H) 05/29/2020    CHOL 222 (H) 11/20/2019     Lab Results   Component Value Date    HDL 47 (L) 09/12/2020    HDL 51 (L) 05/29/2020    HDL 48 (L) 11/20/2019     Lab Results   Component Value Date    LDLCALC 152 (H) 09/12/2020    LDLCALC 163 (H) 05/29/2020    LDLCALC 150 (H) 11/20/2019     Lab Results   Component Value Date    TRIG 118 09/12/2020    TRIG 99 05/29/2020    TRIG 122 11/20/2019     No results found for: CHOLHDL    Lab Results   Component Value Date    TSH 1.67 09/17/2020       No results found for: HGBA1C    No results found for: HEPCAB      There is no immunization history for the selected administration types on file for this patient.       Assessment and Plan       Assessment/Plan     Problem List Items Addressed This Visit        Digestive    Vitamin D deficiency    Current Assessment & Plan     ---has b/w pending            Other    Localized edema - Primary    Current Assessment & Plan     currently stable at this t candy and now resolved,  Will keep her legs up  ---compression stockings can be used if still w ith symptoms               No follow-ups on file.    No orders of the defined types were placed in this encounter.         Tamara Valdivia DO  1/2/2022

## 2021-12-22 ENCOUNTER — TELEPHONE (OUTPATIENT)
Dept: PRIMARY CARE | Facility: CLINIC | Age: 66
End: 2021-12-22
Payer: COMMERCIAL

## 2021-12-24 LAB — CAMPYLOBACTER STL CULT: NORMAL

## 2021-12-30 RX ORDER — ASPIRIN 325 MG
50000 TABLET, DELAYED RELEASE (ENTERIC COATED) ORAL WEEKLY
Qty: 4 CAPSULE | Refills: 0 | Status: SHIPPED | OUTPATIENT
Start: 2021-12-30 | End: 2022-03-23

## 2021-12-30 NOTE — PROGRESS NOTES
Vitamin d at 50,000units/weekly x 4 weeks.    Will continue with current vit d daily--pt may need to reeval all of her supplements that she takes as may impair her absorption

## 2022-01-02 ASSESSMENT — ENCOUNTER SYMPTOMS
EYE REDNESS: 0
DIAPHORESIS: 0
SINUS PRESSURE: 0
PALPITATIONS: 0
WHEEZING: 0
CHILLS: 0
SHORTNESS OF BREATH: 0
APPETITE CHANGE: 0
UNEXPECTED WEIGHT CHANGE: 0
VOICE CHANGE: 0
ACTIVITY CHANGE: 0
EYE ITCHING: 0
EYE PAIN: 0
FATIGUE: 0
CHEST TIGHTNESS: 0
COUGH: 0
EYE DISCHARGE: 0
SINUS PAIN: 0

## 2022-01-03 NOTE — ASSESSMENT & PLAN NOTE
currently stable at this t candy and now resolved,  Will keep her legs up  ---compression stockings can be used if still w ith symptoms

## 2022-01-06 DIAGNOSIS — E55.9 VITAMIN D DEFICIENCY: Primary | ICD-10-CM

## 2022-01-06 RX ORDER — FUROSEMIDE 20 MG/1
20 TABLET ORAL EVERY OTHER DAY
Qty: 15 TABLET | Refills: 0 | Status: SHIPPED | OUTPATIENT
Start: 2022-01-06 | End: 2022-01-07 | Stop reason: SDUPTHER

## 2022-01-06 NOTE — TELEPHONE ENCOUNTER
Medicine Refill Request    Last Office: 12/20/2021   Last Consult Visit: Visit date not found  Last Telemedicine Visit: Visit date not found    Next Appointment: Visit date not found      Current Outpatient Medications:   •  Bifidobacterium infantis (DIGESTIVE PROBIOTIC ORAL), Take by mouth. Triphala, Disp: , Rfl:   •  CALCIUM ORAL, Take by mouth. Natural Vitality Calm and Calcium 1 tsp BID, Disp: , Rfl:   •  cholecalciferol 1,250 mcg (50,000 unit) capsule, Take 1 capsule (50,000 Units total) by mouth once a week for 4 doses., Disp: 4 capsule, Rfl: 0  •  cholecalciferol, vitamin D3, 5,000 unit (125 mcg) capsule, Take 5,000 Units by mouth daily., Disp: , Rfl:   •  cyanocobalamin, vitamin B-12, (VITAMIN B-12 ORAL), Take 5,000 mcg by mouth. Vitamin shoppe, Disp: , Rfl:   •  HERBAL DRUGS ORAL, Take by mouth. Berberine 500 mg BID, Disp: , Rfl:   •  hydrochlorothiazide 25 mg tablet, Take 1 tablet (25 mg total) by mouth daily., Disp: 20 tablet, Rfl: 0  •  MAGNESIUM ORAL, Take by mouth., Disp: , Rfl:   •  nicotine polacrilex (COMMIT) 4 mg lozenge, Apply 4 mg to cheek See admin instr. q 5 hours PRN , Disp: , Rfl:   •  OREGANO OIL ORAL, Take by mouth., Disp: , Rfl:   •  quercetin, 500 mg. Solaray, Disp: , Rfl:   •  RESVERATROL ORAL, Take by mouth. Trunature, 75 mg, Disp: , Rfl:   •  tretinoin (RETIN-A TOP), Apply topically., Disp: , Rfl:       BP Readings from Last 3 Encounters:   12/20/21 128/78   12/19/21 (!) 144/80   09/29/21 132/72       Recent Lab results:  Lab Results   Component Value Date    CHOL 223 (H) 09/12/2020   ,   Lab Results   Component Value Date    HDL 47 (L) 09/12/2020   ,   Lab Results   Component Value Date    LDLCALC 152 (H) 09/12/2020   ,   Lab Results   Component Value Date    TRIG 118 09/12/2020        Lab Results   Component Value Date    GLUCOSE 98 12/19/2021   , No results found for: HGBA1C      Lab Results   Component Value Date    CREATININE 0.6 12/19/2021       Lab Results   Component Value Date     TSH 1.67 09/17/2020

## 2022-01-10 RX ORDER — FUROSEMIDE 20 MG/1
20 TABLET ORAL EVERY OTHER DAY
Qty: 45 TABLET | Refills: 1 | Status: SHIPPED | OUTPATIENT
Start: 2022-01-10 | End: 2022-01-12 | Stop reason: SDUPTHER

## 2022-01-10 NOTE — TELEPHONE ENCOUNTER
Medicine Refill Request    Last Office: 12/20/2021   Last Consult Visit: Visit date not found  Last Telemedicine Visit: Visit date not found    Next Appointment: Visit date not found      Current Outpatient Medications:   •  Bifidobacterium infantis (DIGESTIVE PROBIOTIC ORAL), Take by mouth. Triphala, Disp: , Rfl:   •  CALCIUM ORAL, Take by mouth. Natural Vitality Calm and Calcium 1 tsp BID, Disp: , Rfl:   •  cholecalciferol 1,250 mcg (50,000 unit) capsule, Take 1 capsule (50,000 Units total) by mouth once a week for 4 doses., Disp: 4 capsule, Rfl: 0  •  cholecalciferol, vitamin D3, 5,000 unit (125 mcg) capsule, Take 5,000 Units by mouth daily., Disp: , Rfl:   •  cyanocobalamin, vitamin B-12, (VITAMIN B-12 ORAL), Take 5,000 mcg by mouth. Vitamin shoppe, Disp: , Rfl:   •  furosemide (LASIX) 20 mg tablet, Take 1 tablet (20 mg total) by mouth every other day., Disp: 15 tablet, Rfl: 0  •  HERBAL DRUGS ORAL, Take by mouth. Berberine 500 mg BID, Disp: , Rfl:   •  MAGNESIUM ORAL, Take by mouth., Disp: , Rfl:   •  nicotine polacrilex (COMMIT) 4 mg lozenge, Apply 4 mg to cheek See admin instr. q 5 hours PRN , Disp: , Rfl:   •  OREGANO OIL ORAL, Take by mouth., Disp: , Rfl:   •  quercetin, 500 mg. Solaray, Disp: , Rfl:   •  RESVERATROL ORAL, Take by mouth. Trunature, 75 mg, Disp: , Rfl:   •  tretinoin (RETIN-A TOP), Apply topically., Disp: , Rfl:       BP Readings from Last 3 Encounters:   12/20/21 128/78   12/19/21 (!) 144/80   09/29/21 132/72       Recent Lab results:  Lab Results   Component Value Date    CHOL 223 (H) 09/12/2020   ,   Lab Results   Component Value Date    HDL 47 (L) 09/12/2020   ,   Lab Results   Component Value Date    LDLCALC 152 (H) 09/12/2020   ,   Lab Results   Component Value Date    TRIG 118 09/12/2020        Lab Results   Component Value Date    GLUCOSE 98 12/19/2021   , No results found for: HGBA1C      Lab Results   Component Value Date    CREATININE 0.6 12/19/2021       Lab Results   Component  Value Date    TSH 1.67 09/17/2020

## 2022-01-12 RX ORDER — FUROSEMIDE 20 MG/1
20 TABLET ORAL EVERY OTHER DAY
Qty: 15 TABLET | Refills: 1 | Status: SHIPPED | OUTPATIENT
Start: 2022-01-12 | End: 2022-02-25

## 2022-01-12 RX ORDER — EPINEPHRINE 0.3 MG/.3ML
1 INJECTION SUBCUTANEOUS AS NEEDED
Qty: 1 EACH | Refills: 1 | Status: SHIPPED | OUTPATIENT
Start: 2022-01-12 | End: 2022-02-11

## 2022-03-23 ENCOUNTER — OFFICE VISIT (OUTPATIENT)
Dept: PRIMARY CARE | Facility: CLINIC | Age: 67
End: 2022-03-23
Payer: COMMERCIAL

## 2022-03-23 VITALS
WEIGHT: 172 LBS | BODY MASS INDEX: 28.66 KG/M2 | HEART RATE: 74 BPM | TEMPERATURE: 98.6 F | DIASTOLIC BLOOD PRESSURE: 84 MMHG | SYSTOLIC BLOOD PRESSURE: 132 MMHG | HEIGHT: 65 IN | RESPIRATION RATE: 16 BRPM | OXYGEN SATURATION: 98 %

## 2022-03-23 DIAGNOSIS — R14.0 ABDOMINAL BLOATING: Primary | ICD-10-CM

## 2022-03-23 DIAGNOSIS — J44.9 CHRONIC OBSTRUCTIVE PULMONARY DISEASE, UNSPECIFIED COPD TYPE (CMS/HCC): ICD-10-CM

## 2022-03-23 DIAGNOSIS — E53.8 B12 DEFICIENCY: ICD-10-CM

## 2022-03-23 DIAGNOSIS — R79.83 HOMOCYSTINEMIA: ICD-10-CM

## 2022-03-23 DIAGNOSIS — E55.9 VITAMIN D DEFICIENCY: ICD-10-CM

## 2022-03-23 DIAGNOSIS — L65.9 HAIR LOSS: ICD-10-CM

## 2022-03-23 PROBLEM — Z86.59 MENTAL STATUS CHANGE RESOLVED: Status: RESOLVED | Noted: 2019-04-09 | Resolved: 2022-03-23

## 2022-03-23 PROCEDURE — 99214 OFFICE O/P EST MOD 30 MIN: CPT | Performed by: FAMILY MEDICINE

## 2022-03-23 PROCEDURE — 3008F BODY MASS INDEX DOCD: CPT | Performed by: FAMILY MEDICINE

## 2022-03-23 PROCEDURE — 3075F SYST BP GE 130 - 139MM HG: CPT | Performed by: FAMILY MEDICINE

## 2022-03-23 PROCEDURE — 3079F DIAST BP 80-89 MM HG: CPT | Performed by: FAMILY MEDICINE

## 2022-03-23 NOTE — PROGRESS NOTES
Tamara Valdivia D.O.   [facility] in [address], [address]  [place] [address]  [phone]  Fax [phone]          History of Present Illness   Patient ID: Adeola Rees is a 66 y.o. female.    Subjective     HPI  Pt has had more confusion since covid--pt has had more hair loss-- was working with integrative med and thought had CBO    Pt has excessive gas, severe constipation---bloating with anything she eats.  Pt states does well  w ith fluids but not food and starts symptoms again.   -- did a lot of treatments suggested for CBO--hoping to try abx rx.    Unsure if due to long covid---       Past Medical/Surgical/Family/Social History     The following have been reviewed and updated as appropriate in this visit:   Tobacco  Allergies  Meds  Problems           Past Medical History:   Diagnosis Date   • Cancer (CMS/[facility])     skin cancer-basal   • Celiac disease    • Chemical sensitivity    • COPD (chronic obstructive pulmonary disease) (CMS/[facility])    • Depression    • Environmental allergies    • Female infertility    • H/O gastroesophageal reflux (GERD)    • High cholesterol    • History of bronchitis    • History of sinusitis    • History of skin cancer    • History of traumatic head injury    • Hypertension    • Nasal sinus polyp    • Osteoarthritis    • Osteopenia    • Psoriasis    • Sleep apnea        Past Surgical History:   Procedure Laterality Date   • APPENDECTOMY     • APPENDECTOMY  1970   • COLONOSCOPY  07/2019   • HYSTERECTOMY  1995   • OOPHORECTOMY     • TONSILLECTOMY     • TONSILLECTOMY  1964   • UPPER GASTROINTESTINAL ENDOSCOPY  07/2019   • WISDOM TOOTH EXTRACTION  1976       Family History   Problem Relation Age of Onset   • Hypertension Biological Mother    • Obesity Biological Mother    • Kidney disease Biological Mother    • Hypertension Biological Father    • Obesity Biological Father    • Heart disease Biological Father    • Diabetes Biological  Father    • Arthritis Biological Father    • Thyroid cancer Biological Sister    • Cancer Biological Sister    • ADD / ADHD Biological Sister    • Hypertension Biological Brother    • Eczema Biological Brother    • Heart disease Maternal Grandmother    • Hypertension Maternal Grandmother    • Obesity Maternal Grandmother    • Depression Maternal Grandmother    • Hypertension Maternal Grandfather    • Heart disease Maternal Grandfather    • Stroke Maternal Grandfather    • Dementia Paternal Grandmother    • Diabetes Paternal Grandfather    • Cancer Paternal Grandfather        Social History     Socioeconomic History   • Marital status:      Spouse name: Cristopher    • Number of children: 2   • Years of education: Not on file   • Highest education level: Not on file   Occupational History   • Occupation: homemaker   Tobacco Use   • Smoking status: Former Smoker     Packs/day: 1.00     Years: 48.00     Pack years: 48.00     Types: Cigarettes     Start date: 1968     Quit date: 2016     Years since quittin.3   • Smokeless tobacco: Never Used   • Tobacco comment: Uses lozenges   Vaping Use   • Vaping Use: Never used   Substance and Sexual Activity   • Alcohol use: Not Currently   • Drug use: Never   • Sexual activity: Not Currently   Other Topics Concern   • Not on file   Social History Narrative    Has two adopted children     Social Determinants of Health     Financial Resource Strain: Not on file   Food Insecurity: No Food Insecurity   • Worried About Running Out of Food in the Last Year: Never true   • Ran Out of Food in the Last Year: Never true   Transportation Needs: Not on file   Physical Activity: Not on file   Stress: Not on file   Social Connections: Not on file   Intimate Partner Violence: Not on file   Housing Stability: Not on file      Allergies and Medications     Allergies   Allergen Reactions   • Bee Sting [Bee Venom Protein (Honey Bee)] Anaphylaxis   • Honey    • Penicillin G  Anaphylaxis   • Penicillins Anaphylaxis     Other reaction(s): Anaphylaxis   • Shellfish Derived Hives   • Sulfa (Sulfonamide Antibiotics) Anaphylaxis   • Ceftin [Cefuroxime Axetil] Other (see comments)     Per patient bone pain   • Ciprofibrate    • Ciprofloxacin      Other reaction(s): Muscular pain         Current Outpatient Medications:   •  CALCIUM ORAL, Take by mouth. Natural Vitality Calm and Calcium 1 tsp BID, Disp: , Rfl:   •  cholecalciferol, vitamin D3, 5,000 unit (125 mcg) capsule, Take 5,000 Units by mouth daily., Disp: , Rfl:   •  cyanocobalamin, vitamin B-12, (VITAMIN B-12 ORAL), Take 5,000 mcg by mouth. Vitamin shoppe, Disp: , Rfl:   •  MAGNESIUM ORAL, Take by mouth., Disp: , Rfl:   •  nicotine polacrilex (COMMIT) 4 mg lozenge, Apply 4 mg to cheek See admin instr. q 5 hours PRN, Disp: , Rfl:   •  tretinoin (RETIN-A TOP), Apply topically., Disp: , Rfl:    Review of Systems       Review of Systems   Constitutional: Positive for fatigue. Negative for activity change, appetite change, chills, diaphoresis and unexpected weight change.   HENT: Negative for congestion, hearing loss, postnasal drip, sinus pressure, sinus pain and voice change.    Eyes: Negative for pain, discharge, redness, itching and visual disturbance.   Respiratory: Negative for cough, chest tightness, shortness of breath and wheezing.    Cardiovascular: Negative for chest pain, palpitations and leg swelling.   Gastrointestinal: Positive for abdominal distention, constipation, diarrhea and nausea.   Endocrine: Positive for heat intolerance.   Skin:        Chronic hair loss complaint   Psychiatric/Behavioral: Negative for sleep disturbance. The patient is nervous/anxious.       Physical Examination       Objective     Vitals:    03/23/22 1409   BP: 132/84   Pulse:    Resp:    Temp:    SpO2:        Vitals:    03/23/22 1334 03/23/22 1409   BP: 136/72 132/84   BP Location: Left upper arm    Patient Position: Sitting    Pulse: 74    Resp: 16   "  Temp: 37 °C (98.6 °F)    TempSrc: Temporal    SpO2: 98%    Weight: 78 kg (172 lb)    Height: 1.651 m (5' 5\")        Wt Readings from Last 3 Encounters:   03/23/22 78 kg (172 lb)   12/20/21 78.5 kg (173 lb)   12/19/21 79.8 kg (175 lb 14.4 oz)       Ht Readings from Last 3 Encounters:   03/23/22 1.651 m (5' 5\")   12/20/21 1.651 m (5' 5\")   12/19/21 1.651 m (5' 5\")       BP Readings from Last 3 Encounters:   03/23/22 132/84   12/20/21 128/78   12/19/21 (!) 144/80       Physical Exam  Vitals and nursing note reviewed.   Constitutional:       Appearance: She is well-developed.   HENT:      Head: Normocephalic and atraumatic.      Nose: Nose normal.   Eyes:      Conjunctiva/sclera: Conjunctivae normal.      Pupils: Pupils are equal, round, and reactive to light.   Neck:      Thyroid: No thyromegaly.   Cardiovascular:      Rate and Rhythm: Normal rate and regular rhythm.      Heart sounds: Normal heart sounds.   Pulmonary:      Effort: Pulmonary effort is normal.      Breath sounds: Normal breath sounds.   Musculoskeletal:      Cervical back: Normal range of motion and neck supple.   Lymphadenopathy:      Cervical: No cervical adenopathy.        Laboratory Results     Lab Results   Component Value Date    WBC 4.2 03/27/2022    HGB 14.5 03/27/2022    HCT 43.0 03/27/2022    MCV 91.1 03/27/2022     03/27/2022          Chemistry        Component Value Date/Time     03/27/2022 1116     12/19/2021 0343    K 4.5 03/27/2022 1116    K 3.5 (L) 12/19/2021 0343     03/27/2022 1116     12/19/2021 0343    CO2 27 03/27/2022 1116    CO2 27 12/19/2021 0343    BUN 10 03/27/2022 1116    BUN 13 12/19/2021 0343    CREATININE 0.79 03/27/2022 1116    CREATININE 0.6 12/19/2021 0343        Component Value Date/Time    CALCIUM 9.3 03/27/2022 1116    CALCIUM 9.2 12/19/2021 0343    ALKPHOS 70 03/27/2022 1116    ALKPHOS 65 12/19/2021 0343    AST 19 03/27/2022 1116    AST 29 12/19/2021 0343    ALT 20 03/27/2022 1116    " ALT 28 12/19/2021 0343    BILITOT 0.3 03/27/2022 1116    BILITOT 1.1 12/19/2021 0343            Lab Results   Component Value Date    GLUCOSE 85 03/27/2022    CALCIUM 9.3 03/27/2022     03/27/2022    K 4.5 03/27/2022    CO2 27 03/27/2022     03/27/2022    BUN 10 03/27/2022    CREATININE 0.79 03/27/2022       Lab Results   Component Value Date    CHOL 223 (H) 09/12/2020    CHOL 234 (H) 05/29/2020    CHOL 222 (H) 11/20/2019     Lab Results   Component Value Date    HDL 47 (L) 09/12/2020    HDL 51 (L) 05/29/2020    HDL 48 (L) 11/20/2019     Lab Results   Component Value Date    LDLCALC 152 (H) 09/12/2020    LDLCALC 163 (H) 05/29/2020    LDLCALC 150 (H) 11/20/2019     Lab Results   Component Value Date    TRIG 118 09/12/2020    TRIG 99 05/29/2020    TRIG 122 11/20/2019     No results found for: CHOLHDL    Lab Results   Component Value Date    TSH 2.28 03/27/2022       No results found for: HGBA1C    No results found for: HEPCAB      There is no immunization history for the selected administration types on file for this patient.       Assessment and Plan       Assessment/Plan     Problem List Items Addressed This Visit        Respiratory    COPD (chronic obstructive pulmonary disease) (CMS/[facility])    Current Assessment & Plan     No new changes noted, no complaints of sob.              Digestive    Vitamin D deficiency    Relevant Orders    Vitamin D 25 hydroxy (Completed)    B12 deficiency    Current Assessment & Plan     -due for updated labs           Relevant Orders    Vitamin B12 (Completed)       Endocrine/Metabolic    Homocystinemia (CMS/[facility])    Current Assessment & Plan     Was + at 2020 bloodwork,  Will need to be reeval              Other    Abdominal bloating - Primary    Current Assessment & Plan     Discussed with pt and feels has CBO--reviewed integrative med---  Pt wants to give trial of herbal remedies again in hopes will be cleared up  Otherwise  Cipro/doxycycline may be appropriate for 10  days--pt will let me know how oregano oil and other remedies improve symptoms           Hair loss    Current Assessment & Plan     --checking labs  Unsure if related to covid in past           Relevant Orders    Comprehensive metabolic panel (Completed)    TSH (Completed)    T4, free (Completed)    T3 (Completed)    CBC (Completed)          No follow-ups on file.    Orders Placed This Encounter   Procedures   • Comprehensive metabolic panel     Standing Status:   Future     Number of Occurrences:   1     Standing Expiration Date:   3/23/2023     Order Specific Question:   Release to patient     Answer:   Immediate   • TSH     Standing Status:   Future     Number of Occurrences:   1     Standing Expiration Date:   3/23/2023     Order Specific Question:   Release to patient     Answer:   Immediate   • T4, free     Standing Status:   Future     Number of Occurrences:   1     Standing Expiration Date:   3/23/2023     Order Specific Question:   Release to patient     Answer:   Immediate   • T3     Standing Status:   Future     Number of Occurrences:   1     Standing Expiration Date:   3/23/2023     Order Specific Question:   Release to patient     Answer:   Immediate   • Vitamin D 25 hydroxy     Standing Status:   Future     Number of Occurrences:   1     Standing Expiration Date:   3/23/2023     Order Specific Question:   Release to patient     Answer:   Immediate   • Vitamin B12     Standing Status:   Future     Number of Occurrences:   1     Standing Expiration Date:   3/23/2023     Order Specific Question:   Release to patient     Answer:   Immediate   • CBC     Standing Status:   Future     Number of Occurrences:   1     Standing Expiration Date:   3/23/2023     Order Specific Question:   Release to patient     Answer:   Immediate          Tamara Valdivia DO  4/3/2022

## 2022-03-26 ENCOUNTER — APPOINTMENT (OUTPATIENT)
Dept: LAB | Age: 67
End: 2022-03-26
Attending: FAMILY MEDICINE
Payer: COMMERCIAL

## 2022-03-26 DIAGNOSIS — L65.9 NONSCARRING HAIR LOSS, UNSPECIFIED: ICD-10-CM

## 2022-03-26 DIAGNOSIS — E53.8 DEFICIENCY OF OTHER SPECIFIED B GROUP VITAMINS: ICD-10-CM

## 2022-03-26 DIAGNOSIS — E55.9 VITAMIN D DEFICIENCY, UNSPECIFIED: ICD-10-CM

## 2022-03-26 PROCEDURE — 30099997 SPECIMEN PROCESSING

## 2022-03-27 LAB
25(OH)D3 SERPL-MCNC: 40 NG/ML (ref 30–100)
ALBUMIN SERPL-MCNC: 4.3 G/DL (ref 3.6–5.1)
ALBUMIN/GLOB SERPL: 1.9 (CALC) (ref 1–2.5)
ALP SERPL-CCNC: 70 U/L (ref 37–153)
ALT SERPL-CCNC: 20 U/L (ref 6–29)
AST SERPL-CCNC: 19 U/L (ref 10–35)
BILIRUB SERPL-MCNC: 0.3 MG/DL (ref 0.2–1.2)
BUN SERPL-MCNC: 10 MG/DL (ref 7–25)
BUN/CREAT SERPL: NORMAL (CALC) (ref 6–22)
CALCIUM SERPL-MCNC: 9.3 MG/DL (ref 8.6–10.4)
CHLORIDE SERPL-SCNC: 106 MMOL/L (ref 98–110)
CO2 SERPL-SCNC: 27 MMOL/L (ref 20–32)
CREAT SERPL-MCNC: 0.79 MG/DL (ref 0.5–0.99)
ERYTHROCYTE [DISTWIDTH] IN BLOOD BY AUTOMATED COUNT: 12.6 % (ref 11–15)
GLOBULIN SER CALC-MCNC: 2.3 G/DL (CALC) (ref 1.9–3.7)
GLUCOSE SERPL-MCNC: 85 MG/DL (ref 65–99)
HCT VFR BLD AUTO: 43 % (ref 35–45)
HGB BLD-MCNC: 14.5 G/DL (ref 11.7–15.5)
MCH RBC QN AUTO: 30.7 PG (ref 27–33)
MCHC RBC AUTO-ENTMCNC: 33.7 G/DL (ref 32–36)
MCV RBC AUTO: 91.1 FL (ref 80–100)
PLATELET # BLD AUTO: 262 THOUSAND/UL (ref 140–400)
PMV BLD REES-ECKER: 9.8 FL (ref 7.5–12.5)
POTASSIUM SERPL-SCNC: 4.5 MMOL/L (ref 3.5–5.3)
PROT SERPL-MCNC: 6.6 G/DL (ref 6.1–8.1)
QUEST EGFR AFRICAN AMERICAN: 92 ML/MIN/1.73M2
QUEST EGFR NON-AFR. AMERICAN: 79 ML/MIN/1.73M2
RBC # BLD AUTO: 4.72 MILLION/UL (ref 3.8–5.1)
SODIUM SERPL-SCNC: 141 MMOL/L (ref 135–146)
T3 SERPL-MCNC: 113 NG/DL (ref 76–181)
T4 FREE SERPL-MCNC: 1.1 NG/DL (ref 0.8–1.8)
TSH SERPL-ACNC: 2.28 MIU/L (ref 0.4–4.5)
VIT B12 SERPL-MCNC: 627 PG/ML (ref 200–1100)
WBC # BLD AUTO: 4.2 THOUSAND/UL (ref 3.8–10.8)

## 2022-04-03 PROBLEM — R79.83 HOMOCYSTINEMIA: Status: ACTIVE | Noted: 2022-04-03

## 2022-04-03 ASSESSMENT — ENCOUNTER SYMPTOMS
EYE PAIN: 0
ABDOMINAL DISTENTION: 1
SHORTNESS OF BREATH: 0
SINUS PAIN: 0
WHEEZING: 0
UNEXPECTED WEIGHT CHANGE: 0
VOICE CHANGE: 0
CHILLS: 0
NAUSEA: 1
ACTIVITY CHANGE: 0
EYE ITCHING: 0
COUGH: 0
DIARRHEA: 1
EYE REDNESS: 0
DIAPHORESIS: 0
SLEEP DISTURBANCE: 0
NERVOUS/ANXIOUS: 1
FATIGUE: 1
PALPITATIONS: 0
CONSTIPATION: 1
SINUS PRESSURE: 0
EYE DISCHARGE: 0
CHEST TIGHTNESS: 0
APPETITE CHANGE: 0

## 2022-04-03 NOTE — ASSESSMENT & PLAN NOTE
Discussed with pt and feels has CBO--reviewed integrative med---  Pt wants to give trial of herbal remedies again in hopes will be cleared up  Otherwise  Cipro/doxycycline may be appropriate for 10 days--pt will let me know how oregano oil and other remedies improve symptoms

## 2022-07-18 ENCOUNTER — OFFICE VISIT (OUTPATIENT)
Dept: PRIMARY CARE | Facility: CLINIC | Age: 67
End: 2022-07-18
Payer: COMMERCIAL

## 2022-07-18 VITALS
HEART RATE: 72 BPM | RESPIRATION RATE: 16 BRPM | OXYGEN SATURATION: 98 % | DIASTOLIC BLOOD PRESSURE: 84 MMHG | SYSTOLIC BLOOD PRESSURE: 124 MMHG | BODY MASS INDEX: 27.82 KG/M2 | HEIGHT: 65 IN | WEIGHT: 167 LBS | TEMPERATURE: 97.6 F

## 2022-07-18 DIAGNOSIS — J44.9 CHRONIC OBSTRUCTIVE PULMONARY DISEASE, UNSPECIFIED COPD TYPE (CMS/HCC): ICD-10-CM

## 2022-07-18 DIAGNOSIS — Z87.891 FORMER SMOKER: ICD-10-CM

## 2022-07-18 DIAGNOSIS — M25.512 ACUTE PAIN OF LEFT SHOULDER: Primary | ICD-10-CM

## 2022-07-18 PROCEDURE — 3074F SYST BP LT 130 MM HG: CPT | Performed by: FAMILY MEDICINE

## 2022-07-18 PROCEDURE — 3079F DIAST BP 80-89 MM HG: CPT | Performed by: FAMILY MEDICINE

## 2022-07-18 PROCEDURE — 3008F BODY MASS INDEX DOCD: CPT | Performed by: FAMILY MEDICINE

## 2022-07-18 PROCEDURE — 99214 OFFICE O/P EST MOD 30 MIN: CPT | Performed by: FAMILY MEDICINE

## 2022-07-18 NOTE — PROGRESS NOTES
Tamara Valdivia D.O.   [facility] in [address], [address]  [place] [address]  [phone]  Fax [phone]          History of Present Illness   Patient ID: Adeola Rees is a 66 y.o. female.    Subjective   Left shoulder pain    HPI  Pt complaining of increased shoulder pain /left side    --Over the last 2 weeks  Pt does lifting at work and has had more complaints with barriers to full ROM noted.    No complaints of neck pain,   Using otc meds to help with symptoms  ---pt also due for  Updated ct scan/lung  With hx of smoking started 1968 to 2016  Smoked 1 ppday for 48 years  Cigarette use    No complaints of chronic sob   Has had episodes of bronchitis in past         Past Medical/Surgical/Family/Social History     The following have been reviewed and updated as appropriate in this visit:   Tobacco  Problems           Past Medical History:   Diagnosis Date   • Cancer (CMS/[facility])     skin cancer-basal   • Celiac disease    • Chemical sensitivity    • COPD (chronic obstructive pulmonary disease) (CMS/[facility])    • Depression    • Environmental allergies    • Female infertility    • H/O gastroesophageal reflux (GERD)    • High cholesterol    • History of bronchitis    • History of sinusitis    • History of skin cancer    • History of traumatic head injury    • Hypertension    • Nasal sinus polyp    • Osteoarthritis    • Osteopenia    • Psoriasis    • Sleep apnea        Past Surgical History:   Procedure Laterality Date   • APPENDECTOMY     • APPENDECTOMY  1970   • COLONOSCOPY  07/2019   • HYSTERECTOMY  1995   • OOPHORECTOMY     • TONSILLECTOMY     • TONSILLECTOMY  1964   • UPPER GASTROINTESTINAL ENDOSCOPY  07/2019   • WISDOM TOOTH EXTRACTION  1976       Family History   Problem Relation Age of Onset   • Hypertension Biological Mother    • Obesity Biological Mother    • Kidney disease Biological Mother    • Hypertension Biological Father    • Obesity Biological Father    •  Heart disease Biological Father    • Diabetes Biological Father    • Arthritis Biological Father    • Thyroid cancer Biological Sister    • Cancer Biological Sister    • ADD / ADHD Biological Sister    • Hypertension Biological Brother    • Eczema Biological Brother    • Heart disease Maternal Grandmother    • Hypertension Maternal Grandmother    • Obesity Maternal Grandmother    • Depression Maternal Grandmother    • Hypertension Maternal Grandfather    • Heart disease Maternal Grandfather    • Stroke Maternal Grandfather    • Dementia Paternal Grandmother    • Diabetes Paternal Grandfather    • Cancer Paternal Grandfather        Social History     Socioeconomic History   • Marital status:      Spouse name: Cristopher    • Number of children: 2   • Years of education: Not on file   • Highest education level: Not on file   Occupational History   • Occupation: homemaker   Tobacco Use   • Smoking status: Former Smoker     Packs/day: 1.00     Years: 48.00     Pack years: 48.00     Types: Cigarettes     Start date: 1968     Quit date: 2016     Years since quittin.6   • Smokeless tobacco: Never Used   • Tobacco comment: Uses lozenges   Vaping Use   • Vaping Use: Never used   Substance and Sexual Activity   • Alcohol use: Not Currently   • Drug use: Never   • Sexual activity: Defer   Other Topics Concern   • Not on file   Social History Narrative    Has two adopted children     Social Determinants of Health     Financial Resource Strain: Not on file   Food Insecurity: No Food Insecurity   • Worried About Running Out of Food in the Last Year: Never true   • Ran Out of Food in the Last Year: Never true   Transportation Needs: Not on file   Physical Activity: Not on file   Stress: Not on file   Social Connections: Not on file   Intimate Partner Violence: Not on file   Housing Stability: Not on file      Allergies and Medications     Allergies   Allergen Reactions   • Bee Sting [Bee Venom Protein (Honey Bee)]  "Anaphylaxis   • Honey    • Penicillin G Anaphylaxis   • Penicillins Anaphylaxis     Other reaction(s): Anaphylaxis   • Shellfish Derived Hives   • Sulfa (Sulfonamide Antibiotics) Anaphylaxis   • Ceftin [Cefuroxime Axetil] Other (see comments)     Per patient bone pain   • Ciprofibrate    • Ciprofloxacin      Other reaction(s): Muscular pain         Current Outpatient Medications:   •  CALCIUM ORAL, Take by mouth. Natural Vitality Calm and Calcium 1 tsp BID, Disp: , Rfl:   •  cholecalciferol, vitamin D3, 5,000 unit (125 mcg) capsule, Take 5,000 Units by mouth daily., Disp: , Rfl:   •  cyanocobalamin, vitamin B-12, (VITAMIN B-12 ORAL), Take 5,000 mcg by mouth. Vitamin shoppe, Disp: , Rfl:   •  MAGNESIUM ORAL, Take by mouth., Disp: , Rfl:   •  nicotine polacrilex (COMMIT) 4 mg lozenge, Apply 4 mg to cheek See admin instr. q 5 hours PRN, Disp: , Rfl:   •  tretinoin (RETIN-A TOP), Apply topically., Disp: , Rfl:    Review of Systems       Review of Systems   Constitutional: Negative for activity change, appetite change, chills, diaphoresis, fatigue and unexpected weight change.   HENT: Negative for congestion, hearing loss, postnasal drip, sinus pressure, sinus pain and voice change.    Eyes: Negative for pain, discharge, redness, itching and visual disturbance.   Respiratory: Negative for cough, chest tightness, shortness of breath and wheezing.    Cardiovascular: Negative for chest pain, palpitations and leg swelling.   Musculoskeletal: Positive for arthralgias.        Shoulder pain with decreased ROM noted      Physical Examination       Objective     Vitals:    07/18/22 1019   BP: 124/84   Pulse:    Resp:    Temp:    SpO2:        Vitals:    07/18/22 1010 07/18/22 1019   BP: (!) 142/80 124/84   BP Location: Left upper arm    Patient Position: Sitting    Pulse: 72    Resp: 16    Temp: 36.4 °C (97.6 °F)    TempSrc: Temporal    SpO2: 98%    Weight: 75.8 kg (167 lb)    Height: 1.638 m (5' 4.5\")        Wt Readings from Last " "3 Encounters:   07/18/22 75.8 kg (167 lb)   03/23/22 78 kg (172 lb)   12/20/21 78.5 kg (173 lb)       Ht Readings from Last 3 Encounters:   07/18/22 1.638 m (5' 4.5\")   03/23/22 1.651 m (5' 5\")   12/20/21 1.651 m (5' 5\")       BP Readings from Last 3 Encounters:   07/18/22 124/84   03/23/22 132/84   12/20/21 128/78       Physical Exam  Vitals and nursing note reviewed.   Constitutional:       Appearance: She is well-developed.   HENT:      Head: Normocephalic and atraumatic.      Nose: Nose normal.   Eyes:      Conjunctiva/sclera: Conjunctivae normal.      Pupils: Pupils are equal, round, and reactive to light.   Neck:      Thyroid: No thyromegaly.   Cardiovascular:      Rate and Rhythm: Normal rate and regular rhythm.      Heart sounds: Normal heart sounds.   Pulmonary:      Effort: Pulmonary effort is normal.      Breath sounds: Normal breath sounds.   Musculoskeletal:         General: Tenderness present.      Right shoulder: Tenderness and bony tenderness present. Decreased range of motion. Decreased strength.      Cervical back: Normal range of motion and neck supple.   Lymphadenopathy:      Cervical: No cervical adenopathy.        Laboratory Results     Lab Results   Component Value Date    WBC 4.2 03/27/2022    HGB 14.5 03/27/2022    HCT 43.0 03/27/2022    MCV 91.1 03/27/2022     03/27/2022          Chemistry        Component Value Date/Time     03/27/2022 1116     12/19/2021 0343    K 4.5 03/27/2022 1116    K 3.5 (L) 12/19/2021 0343     03/27/2022 1116     12/19/2021 0343    CO2 27 03/27/2022 1116    CO2 27 12/19/2021 0343    BUN 10 03/27/2022 1116    BUN 13 12/19/2021 0343    CREATININE 0.79 03/27/2022 1116    CREATININE 0.6 12/19/2021 0343        Component Value Date/Time    CALCIUM 9.3 03/27/2022 1116    CALCIUM 9.2 12/19/2021 0343    ALKPHOS 70 03/27/2022 1116    ALKPHOS 65 12/19/2021 0343    AST 19 03/27/2022 1116    AST 29 12/19/2021 0343    ALT 20 03/27/2022 1116    ALT " 28 12/19/2021 0343    BILITOT 0.3 03/27/2022 1116    BILITOT 1.1 12/19/2021 0343            Lab Results   Component Value Date    GLUCOSE 85 03/27/2022    CALCIUM 9.3 03/27/2022     03/27/2022    K 4.5 03/27/2022    CO2 27 03/27/2022     03/27/2022    BUN 10 03/27/2022    CREATININE 0.79 03/27/2022       Lab Results   Component Value Date    CHOL 223 (H) 09/12/2020    CHOL 234 (H) 05/29/2020    CHOL 222 (H) 11/20/2019     Lab Results   Component Value Date    HDL 47 (L) 09/12/2020    HDL 51 (L) 05/29/2020    HDL 48 (L) 11/20/2019     Lab Results   Component Value Date    LDLCALC 152 (H) 09/12/2020    LDLCALC 163 (H) 05/29/2020    LDLCALC 150 (H) 11/20/2019     Lab Results   Component Value Date    TRIG 118 09/12/2020    TRIG 99 05/29/2020    TRIG 122 11/20/2019     No results found for: CHOLHDL    Lab Results   Component Value Date    TSH 2.28 03/27/2022       No results found for: HGBA1C    No results found for: HEPCAB      There is no immunization history for the selected administration types on file for this patient.       Assessment and Plan       Assessment/Plan     Problem List Items Addressed This Visit        Nervous    Acute pain of left shoulder - Primary    Current Assessment & Plan     --discussed with patient and agrees to go to PT for eval and treatment           Relevant Orders    Ambulatory referral to Physical Therapy       Respiratory    COPD (chronic obstructive pulmonary disease) (CMS/HCC)    Current Assessment & Plan     Noted on prior ct lung  --no new complaints.              Relevant Orders    CT LUNG NODULE FOLLOW UP (LOW DOSE) WITHOUT IV CONTRAST       Other    Former smoker    Current Assessment & Plan     --reviewed prior ct low dose /lung:  Due for  Annual  rx completed           Relevant Orders    CT LUNG NODULE FOLLOW UP (LOW DOSE) WITHOUT IV CONTRAST          No follow-ups on file.    Orders Placed This Encounter   Procedures   • CT LUNG NODULE FOLLOW UP (LOW DOSE) WITHOUT  IV CONTRAST     Standing Status:   Future     Standing Expiration Date:   7/18/2023     Order Specific Question:   Release to patient     Answer:   Immediate   • Ambulatory referral to Physical Therapy     Standing Status:   Future     Standing Expiration Date:   1/18/2023     Referral Priority:   Routine     Referral Type:   Physical Therapy     Referral Reason:   Specialty Services Required     Number of Visits Requested:   14          Tamara Valdivia DO  7/31/2022

## 2022-07-27 ENCOUNTER — TELEPHONE (OUTPATIENT)
Dept: PRIMARY CARE | Facility: CLINIC | Age: 67
End: 2022-07-27
Payer: COMMERCIAL

## 2022-07-27 NOTE — TELEPHONE ENCOUNTER
Dr Dea Mir's Ct Chest Lung Nodule  Follow up (Low Dose) Without IV Contrast  Was DENIED by her insurance.    The first denial read:   It is only supported one year after your nodule(s) was found. If the nodule is unchanged on the   one year study, no further imaging is supported.    We sent the  The Lung Screening results from 8/2/2021 with the recommendation to continue annual screening in 12 months.    The second denial:  You must be free of any health problem that would have a severe impact on either of the   following.   -How long you are expected to live.   -Whether or not you would be able or willing to have a lung surgery to cure you.  You must show that you have a 20 pack-year smoking history. These are measured by   multiplying the number of packs smoked per day by the number of years you smoked.  You must be a current smoker or have quit smoking within the last 15 years.  We did not receive a detailed history that shows this study is needed.  We did not receive physical exam results that show a reason this study is needed    Dr Jeffery if you would like to call [facility] the number is: [phone]  Case# [id number]

## 2022-07-31 PROBLEM — Z87.891 FORMER SMOKER: Status: ACTIVE | Noted: 2022-07-31

## 2022-07-31 ASSESSMENT — ENCOUNTER SYMPTOMS
CHILLS: 0
WHEEZING: 0
ARTHRALGIAS: 1
EYE REDNESS: 0
APPETITE CHANGE: 0
EYE ITCHING: 0
UNEXPECTED WEIGHT CHANGE: 0
SINUS PAIN: 0
VOICE CHANGE: 0
PALPITATIONS: 0
ACTIVITY CHANGE: 0
DIAPHORESIS: 0
CHEST TIGHTNESS: 0
EYE PAIN: 0
SHORTNESS OF BREATH: 0
FATIGUE: 0
COUGH: 0
SINUS PRESSURE: 0
EYE DISCHARGE: 0

## 2022-07-31 NOTE — TELEPHONE ENCOUNTER
Shaye--I would use dx of COPD  which she has and instead of completing low dose ct --we can just order it as a ct of lung without contrast.  2nd dx would be lung nodule.

## 2022-08-02 DIAGNOSIS — J44.9 CHRONIC OBSTRUCTIVE PULMONARY DISEASE, UNSPECIFIED COPD TYPE (CMS/HCC): Primary | ICD-10-CM

## 2022-08-02 DIAGNOSIS — R10.31 CHRONIC RIGHT LOWER QUADRANT PAIN: ICD-10-CM

## 2022-08-02 DIAGNOSIS — G89.29 CHRONIC RIGHT LOWER QUADRANT PAIN: ICD-10-CM

## 2022-08-02 DIAGNOSIS — R91.1 LUNG NODULE: ICD-10-CM

## 2022-08-20 ENCOUNTER — HOSPITAL ENCOUNTER (INPATIENT)
Facility: HOSPITAL | Age: 67
LOS: 5 days | Discharge: HOME | DRG: 603 | End: 2022-08-26
Attending: EMERGENCY MEDICINE | Admitting: HOSPITALIST
Payer: COMMERCIAL

## 2022-08-20 ENCOUNTER — HOSPITAL ENCOUNTER (OUTPATIENT)
Facility: CLINIC | Age: 67
Discharge: LEFT BEFORE DISCHARGE | End: 2022-08-20
Attending: EMERGENCY MEDICINE
Payer: COMMERCIAL

## 2022-08-20 VITALS
TEMPERATURE: 99.9 F | SYSTOLIC BLOOD PRESSURE: 122 MMHG | DIASTOLIC BLOOD PRESSURE: 80 MMHG | OXYGEN SATURATION: 98 % | HEART RATE: 100 BPM

## 2022-08-20 DIAGNOSIS — L02.91 ABSCESS: Primary | ICD-10-CM

## 2022-08-20 DIAGNOSIS — L03.317 CELLULITIS OF BUTTOCK: Primary | ICD-10-CM

## 2022-08-20 PROCEDURE — 36415 COLL VENOUS BLD VENIPUNCTURE: CPT | Performed by: EMERGENCY MEDICINE

## 2022-08-20 PROCEDURE — 80053 COMPREHEN METABOLIC PANEL: CPT | Performed by: EMERGENCY MEDICINE

## 2022-08-20 PROCEDURE — U0002 COVID-19 LAB TEST NON-CDC: HCPCS | Performed by: EMERGENCY MEDICINE

## 2022-08-20 PROCEDURE — 87040 BLOOD CULTURE FOR BACTERIA: CPT | Performed by: EMERGENCY MEDICINE

## 2022-08-20 PROCEDURE — S9083 URGENT CARE CENTER GLOBAL: HCPCS | Performed by: EMERGENCY MEDICINE

## 2022-08-20 PROCEDURE — 63600000 HC DRUGS/DETAIL CODE: Performed by: EMERGENCY MEDICINE

## 2022-08-20 PROCEDURE — 99212 OFFICE O/P EST SF 10 MIN: CPT | Performed by: EMERGENCY MEDICINE

## 2022-08-20 PROCEDURE — 25800000 HC PHARMACY IV SOLUTIONS: Performed by: EMERGENCY MEDICINE

## 2022-08-20 PROCEDURE — 96374 THER/PROPH/DIAG INJ IV PUSH: CPT

## 2022-08-20 PROCEDURE — 99285 EMERGENCY DEPT VISIT HI MDM: CPT | Mod: 25

## 2022-08-20 PROCEDURE — 93005 ELECTROCARDIOGRAM TRACING: CPT | Performed by: EMERGENCY MEDICINE

## 2022-08-20 PROCEDURE — 85025 COMPLETE CBC W/AUTO DIFF WBC: CPT | Performed by: EMERGENCY MEDICINE

## 2022-08-20 PROCEDURE — 25000000 HC PHARMACY GENERAL: Performed by: EMERGENCY MEDICINE

## 2022-08-20 PROCEDURE — 8E0ZXY6 ISOLATION: ICD-10-PCS | Performed by: HOSPITALIST

## 2022-08-20 RX ORDER — VANCOMYCIN/0.9 % SOD CHLORIDE 1.5G/250ML
20 PLASTIC BAG, INJECTION (ML) INTRAVENOUS ONCE
Status: COMPLETED | OUTPATIENT
Start: 2022-08-20 | End: 2022-08-21

## 2022-08-20 RX ORDER — KETOROLAC TROMETHAMINE 15 MG/ML
15 INJECTION, SOLUTION INTRAMUSCULAR; INTRAVENOUS ONCE
Status: DISPENSED | OUTPATIENT
Start: 2022-08-20 | End: 2022-08-21

## 2022-08-20 RX ORDER — MORPHINE SULFATE 2 MG/ML
2 INJECTION, SOLUTION INTRAMUSCULAR; INTRAVENOUS ONCE
Status: DISPENSED | OUTPATIENT
Start: 2022-08-20 | End: 2022-08-21

## 2022-08-20 RX ADMIN — VANCOMYCIN HYDROCHLORIDE 1500 MG: 500 INJECTION, POWDER, LYOPHILIZED, FOR SOLUTION INTRAVENOUS at 23:54

## 2022-08-20 NOTE — ED PROVIDER NOTES
History  Chief Complaint   Patient presents with   • Boil on Buttock      Patient believes she has an abscess on her buttock.  It is not draining.  Duration is about 5 or 6 days.  Progression worsening.  Chronicity is new.  No skin injury she is aware of.  Relieved by nothing worsened by nothing no ineffective treatments.  Associated fever last night had a fever last night of 101.  No history of prior abscess      Abscess      Past Medical History:   Diagnosis Date   • Cancer (CMS/[facility])     skin cancer-basal   • Celiac disease    • Chemical sensitivity    • COPD (chronic obstructive pulmonary disease) (CMS/HCC)    • Depression    • Environmental allergies    • Female infertility    • H/O gastroesophageal reflux (GERD)    • High cholesterol    • History of bronchitis    • History of sinusitis    • History of skin cancer    • History of traumatic head injury    • Hypertension    • Nasal sinus polyp    • Osteoarthritis    • Osteopenia    • Psoriasis    • Sleep apnea        Past Surgical History:   Procedure Laterality Date   • APPENDECTOMY     • APPENDECTOMY  1970   • COLONOSCOPY  07/2019   • HYSTERECTOMY  1995   • OOPHORECTOMY     • TONSILLECTOMY     • TONSILLECTOMY  1964   • UPPER GASTROINTESTINAL ENDOSCOPY  07/2019   • WISDOM TOOTH EXTRACTION  1976       Family History   Problem Relation Age of Onset   • Hypertension Biological Mother    • Obesity Biological Mother    • Kidney disease Biological Mother    • Hypertension Biological Father    • Obesity Biological Father    • Heart disease Biological Father    • Diabetes Biological Father    • Arthritis Biological Father    • Thyroid cancer Biological Sister    • Cancer Biological Sister    • ADD / ADHD Biological Sister    • Hypertension Biological Brother    • Eczema Biological Brother    • Heart disease Maternal Grandmother    • Hypertension Maternal Grandmother    • Obesity Maternal Grandmother    • Depression Maternal Grandmother    • Hypertension Maternal  Grandfather    • Heart disease Maternal Grandfather    • Stroke Maternal Grandfather    • Dementia Paternal Grandmother    • Diabetes Paternal Grandfather    • Cancer Paternal Grandfather        Social History     Tobacco Use   • Smoking status: Former Smoker     Packs/day: 1.00     Years: 48.00     Pack years: 48.00     Types: Cigarettes     Start date: 1968     Quit date: 2016     Years since quittin.7   • Smokeless tobacco: Never Used   • Tobacco comment: Uses lozenges   Vaping Use   • Vaping Use: Never used   Substance Use Topics   • Alcohol use: Not Currently   • Drug use: Never       Review of Systems   Skin: Positive for rash.       Physical Exam  ED Triage Vitals [22 1433]   Temp Heart Rate Resp BP SpO2   37.7 °C (99.9 °F) 100 -- 122/80 98 %      Temp src Heart Rate Source Patient Position BP Location FiO2 (%) (Set)   -- -- -- -- --       Physical Exam  Constitutional:       Appearance: Normal appearance.   Neurological:      Mental Status: She is alert.           Procedures  Procedures    UC Course       MDM               Alex Arellano MD  22 [id number]

## 2022-08-21 ENCOUNTER — APPOINTMENT (EMERGENCY)
Dept: RADIOLOGY | Facility: HOSPITAL | Age: 67
DRG: 603 | End: 2022-08-21
Attending: EMERGENCY MEDICINE
Payer: COMMERCIAL

## 2022-08-21 PROBLEM — K59.00 CONSTIPATION: Status: ACTIVE | Noted: 2019-02-14

## 2022-08-21 PROBLEM — L03.317 CELLULITIS OF BUTTOCK: Status: ACTIVE | Noted: 2022-08-21

## 2022-08-21 LAB
ALBUMIN SERPL-MCNC: 3.8 G/DL (ref 3.4–5)
ALP SERPL-CCNC: 71 IU/L (ref 35–126)
ALT SERPL-CCNC: 23 IU/L (ref 11–54)
ANION GAP SERPL CALC-SCNC: 7 MEQ/L (ref 3–15)
AST SERPL-CCNC: 23 IU/L (ref 15–41)
ATRIAL RATE: 84
BASOPHILS # BLD: 0.04 K/UL (ref 0.01–0.1)
BASOPHILS NFR BLD: 0.4 %
BILIRUB SERPL-MCNC: 0.9 MG/DL (ref 0.3–1.2)
BUN SERPL-MCNC: 10 MG/DL (ref 8–20)
CALCIUM SERPL-MCNC: 9.2 MG/DL (ref 8.9–10.3)
CHLORIDE SERPL-SCNC: 102 MEQ/L (ref 98–109)
CO2 SERPL-SCNC: 27 MEQ/L (ref 22–32)
CREAT SERPL-MCNC: 0.7 MG/DL (ref 0.6–1.1)
DIFFERENTIAL METHOD BLD: ABNORMAL
EOSINOPHIL # BLD: 0.27 K/UL (ref 0.04–0.36)
EOSINOPHIL NFR BLD: 2.8 %
ERYTHROCYTE [DISTWIDTH] IN BLOOD BY AUTOMATED COUNT: 12.1 % (ref 11.7–14.4)
GFR SERPL CREATININE-BSD FRML MDRD: >60 ML/MIN/1.73M*2
GLUCOSE SERPL-MCNC: 114 MG/DL (ref 70–99)
HCT VFR BLDCO AUTO: 39.1 % (ref 35–45)
HGB BLD-MCNC: 13.4 G/DL (ref 11.8–15.7)
IMM GRANULOCYTES # BLD AUTO: 0.04 K/UL (ref 0–0.08)
IMM GRANULOCYTES NFR BLD AUTO: 0.4 %
LYMPHOCYTES # BLD: 2.1 K/UL (ref 1.2–3.5)
LYMPHOCYTES NFR BLD: 22 %
MCH RBC QN AUTO: 31.2 PG (ref 28–33.2)
MCHC RBC AUTO-ENTMCNC: 34.3 G/DL (ref 32.2–35.5)
MCV RBC AUTO: 91.1 FL (ref 83–98)
MONOCYTES # BLD: 0.97 K/UL (ref 0.28–0.8)
MONOCYTES NFR BLD: 10.1 %
NEUTROPHILS # BLD: 6.14 K/UL (ref 1.7–7)
NEUTS SEG NFR BLD: 64.3 %
NRBC BLD-RTO: 0 %
P AXIS: 42
PDW BLD AUTO: 9.1 FL (ref 9.4–12.3)
PLATELET # BLD AUTO: 219 K/UL (ref 150–369)
POTASSIUM SERPL-SCNC: 4 MEQ/L (ref 3.6–5.1)
PR INTERVAL: 162
PROT SERPL-MCNC: 7.1 G/DL (ref 6–8.2)
QRS DURATION: 80
QT INTERVAL: 376
QTC CALCULATION(BAZETT): 444
R AXIS: 51
RBC # BLD AUTO: 4.29 M/UL (ref 3.93–5.22)
SARS-COV-2 RNA RESP QL NAA+PROBE: NEGATIVE
SODIUM SERPL-SCNC: 136 MEQ/L (ref 136–144)
T WAVE AXIS: 36
VENTRICULAR RATE: 84
WBC # BLD AUTO: 9.56 K/UL (ref 3.8–10.5)

## 2022-08-21 PROCEDURE — 25800000 HC PHARMACY IV SOLUTIONS: Performed by: HOSPITALIST

## 2022-08-21 PROCEDURE — 63600000 HC DRUGS/DETAIL CODE: Performed by: HOSPITALIST

## 2022-08-21 PROCEDURE — 200200 PR NO CHARGE: Performed by: HOSPITALIST

## 2022-08-21 PROCEDURE — 63700000 HC SELF-ADMINISTRABLE DRUG: Performed by: HOSPITALIST

## 2022-08-21 PROCEDURE — 99223 1ST HOSP IP/OBS HIGH 75: CPT | Performed by: HOSPITALIST

## 2022-08-21 PROCEDURE — G1004 CDSM NDSC: HCPCS

## 2022-08-21 PROCEDURE — 12000000 HC ROOM AND CARE MED/SURG

## 2022-08-21 PROCEDURE — 25000000 HC PHARMACY GENERAL: Performed by: HOSPITALIST

## 2022-08-21 RX ORDER — VANCOMYCIN HYDROCHLORIDE
1250
Status: DISCONTINUED | OUTPATIENT
Start: 2022-08-21 | End: 2022-08-26 | Stop reason: HOSPADM

## 2022-08-21 RX ORDER — SENNOSIDES 8.6 MG/1
1 TABLET ORAL 2 TIMES DAILY
Status: DISCONTINUED | OUTPATIENT
Start: 2022-08-21 | End: 2022-08-26 | Stop reason: HOSPADM

## 2022-08-21 RX ORDER — MORPHINE SULFATE 2 MG/ML
2 INJECTION, SOLUTION INTRAMUSCULAR; INTRAVENOUS EVERY 4 HOURS PRN
Status: DISCONTINUED | OUTPATIENT
Start: 2022-08-21 | End: 2022-08-22

## 2022-08-21 RX ORDER — ACETAMINOPHEN 325 MG/1
650 TABLET ORAL EVERY 4 HOURS PRN
Status: DISCONTINUED | OUTPATIENT
Start: 2022-08-21 | End: 2022-08-26 | Stop reason: HOSPADM

## 2022-08-21 RX ORDER — POLYETHYLENE GLYCOL 3350 17 G/17G
17 POWDER, FOR SOLUTION ORAL DAILY
Status: DISCONTINUED | OUTPATIENT
Start: 2022-08-21 | End: 2022-08-26 | Stop reason: HOSPADM

## 2022-08-21 RX ORDER — IBUPROFEN 200 MG
1 TABLET ORAL DAILY
Status: DISCONTINUED | OUTPATIENT
Start: 2022-08-21 | End: 2022-08-26 | Stop reason: HOSPADM

## 2022-08-21 RX ORDER — ADHESIVE BANDAGE
30 BANDAGE TOPICAL DAILY PRN
Status: DISCONTINUED | OUTPATIENT
Start: 2022-08-21 | End: 2022-08-26 | Stop reason: HOSPADM

## 2022-08-21 RX ADMIN — AZTREONAM 1 G: 1 INJECTION, POWDER, LYOPHILIZED, FOR SOLUTION INTRAMUSCULAR; INTRAVENOUS at 04:06

## 2022-08-21 RX ADMIN — MORPHINE SULFATE 2 MG: 2 INJECTION, SOLUTION INTRAMUSCULAR; INTRAVENOUS at 22:25

## 2022-08-21 RX ADMIN — VANCOMYCIN HYDROCHLORIDE 1250 MG: 500 INJECTION, POWDER, LYOPHILIZED, FOR SOLUTION INTRAVENOUS at 12:15

## 2022-08-21 RX ADMIN — MORPHINE SULFATE 2 MG: 2 INJECTION, SOLUTION INTRAMUSCULAR; INTRAVENOUS at 08:53

## 2022-08-21 RX ADMIN — SENNOSIDES 1 TABLET: 8.6 TABLET, FILM COATED ORAL at 21:13

## 2022-08-21 RX ADMIN — VANCOMYCIN HYDROCHLORIDE 1250 MG: 500 INJECTION, POWDER, LYOPHILIZED, FOR SOLUTION INTRAVENOUS at 23:35

## 2022-08-21 RX ADMIN — MORPHINE SULFATE 2 MG: 2 INJECTION, SOLUTION INTRAMUSCULAR; INTRAVENOUS at 18:17

## 2022-08-21 RX ADMIN — MORPHINE SULFATE 2 MG: 2 INJECTION, SOLUTION INTRAMUSCULAR; INTRAVENOUS at 04:42

## 2022-08-21 RX ADMIN — AZTREONAM 1 G: 1 INJECTION, POWDER, LYOPHILIZED, FOR SOLUTION INTRAMUSCULAR; INTRAVENOUS at 11:40

## 2022-08-21 RX ADMIN — AZTREONAM 1 G: 1 INJECTION, POWDER, LYOPHILIZED, FOR SOLUTION INTRAMUSCULAR; INTRAVENOUS at 21:40

## 2022-08-21 RX ADMIN — NICOTINE 1 PATCH: 14 PATCH, EXTENDED RELEASE TRANSDERMAL at 11:40

## 2022-08-21 ASSESSMENT — ENCOUNTER SYMPTOMS
FEVER: 1
CHILLS: 1
FATIGUE: 1
CHEST TIGHTNESS: 0
LIGHT-HEADEDNESS: 0
DIARRHEA: 0
ABDOMINAL PAIN: 0
NAUSEA: 0
VOMITING: 0

## 2022-08-21 ASSESSMENT — PATIENT HEALTH QUESTIONNAIRE - PHQ9: SUM OF ALL RESPONSES TO PHQ9 QUESTIONS 1 & 2: 0

## 2022-08-21 NOTE — CONSULTS
Infectious Disease Consult Note    Patient Name: Adeola Rees  MR#: [id number]  : 1955  Admission Date: 2022  Consult Date: 22 2:56 PM   Consultant: Jonny Webb MD    Reason for Consult: 65 y/o w/f with a PMH of COPD secondary to cigarettes smoking, HTN, celiac disease, few antibiotic allergies, including PCN, Cipro, Clinda, Cephalosporins, admitted for R-inner buttock cellulitis.  Referring Provider: ever      History of Present Illness     Adeola Rees is a 66 y.o. female who was admitted on 2022.  For evaluation of nontraumatic, right gluteal pain of 48 hours duration.  In ED patient without temperature elevation.  Patient without new animal or insect contact.  Patient without history of recurrent skin or soft tissue infections.  Patient without antibiotic therapy prior to admission.  Patient alert ambulatory without fever rigors headache visual changes sore throat recent dental manipulations chest pain dyspnea cough nausea vomiting abdominal discomfort urinary symptoms or diarrhea.  Patient without acute skin changes or joint swelling.  Patient reports history of anaphylaxis to penicillin and sulfonamides, intolerant of Ceftin, ciprofloxacin or clindamycin.    Outside records were reviewed.    Allergies:   Allergies   Allergen Reactions    Bee Sting [Bee Venom Protein (Honey Bee)] Anaphylaxis    Honey     Penicillin G Anaphylaxis    Penicillins Anaphylaxis     Other reaction(s): Anaphylaxis    Shellfish Derived Hives    Sulfa (Sulfonamide Antibiotics) Anaphylaxis    Clindamycin     Ceftin [Cefuroxime Axetil] Other (see comments)     Per patient bone pain    Ciprofibrate     Ciprofloxacin      Other reaction(s): Muscular pain       Medical History:   Past Medical History:   Diagnosis Date    Cancer (CMS/[facility])     skin cancer-basal    Celiac disease     Chemical sensitivity     COPD (chronic obstructive pulmonary disease) (CMS/[facility])     Depression      Environmental allergies     Female infertility     H/O gastroesophageal reflux (GERD)     High cholesterol     History of bronchitis     History of sinusitis     History of skin cancer     History of traumatic head injury     Hypertension     Nasal sinus polyp     Osteoarthritis     Osteopenia     Psoriasis     Sleep apnea        Surgical History:   Past Surgical History:   Procedure Laterality Date    APPENDECTOMY      APPENDECTOMY  1970    COLONOSCOPY  2019    HYSTERECTOMY  1995    OOPHORECTOMY      TONSILLECTOMY      TONSILLECTOMY  1964    UPPER GASTROINTESTINAL ENDOSCOPY  2019    WISDOM TOOTH EXTRACTION  1976       Social History:   Social History     Socioeconomic History    Marital status:      Spouse name: Cristopher     Number of children: 2    Years of education: None    Highest education level: None   Occupational History    Occupation: homemaker   Tobacco Use    Smoking status: Former Smoker     Packs/day: 1.00     Years: 48.00     Pack years: 48.00     Types: Cigarettes     Start date: 1968     Quit date: 2016     Years since quittin.7    Smokeless tobacco: Never Used    Tobacco comment: Uses lozenges   Vaping Use    Vaping Use: Never used   Substance and Sexual Activity    Alcohol use: Not Currently    Drug use: Never    Sexual activity: Defer   Social History Narrative    Lives with her , has two adopted children. (22).     Social Determinants of Health     Food Insecurity: No Food Insecurity    Worried About Running Out of Food in the Last Year: Never true    Ran Out of Food in the Last Year: Never true          Travel Exposure:   Travel and Exposure Screening    Flowsheet Row Most Recent Value   Travel Screening    Overnight hospitalization outside the [place] in the last year? No Filed On: 2022 2339   Exposure Screening    Symptoms           Animal Exposure: hpi    Other Exposure: hpi    Family History:   Family History  "  Problem Relation Age of Onset    Hypertension Biological Mother     Obesity Biological Mother     Kidney disease Biological Mother     Hypertension Biological Father     Obesity Biological Father     Heart disease Biological Father     Diabetes Biological Father     Arthritis Biological Father     Thyroid cancer Biological Sister     Cancer Biological Sister     ADD / ADHD Biological Sister     Hypertension Biological Brother     Eczema Biological Brother     Heart disease Maternal Grandmother     Hypertension Maternal Grandmother     Obesity Maternal Grandmother     Depression Maternal Grandmother     Hypertension Maternal Grandfather     Heart disease Maternal Grandfather     Stroke Maternal Grandfather     Dementia Paternal Grandmother     Diabetes Paternal Grandfather     Cancer Paternal Grandfather        Review of Systems    Pertinent items are noted in HPI.    Medications:    Current IP Meds (From admission, onward)        Frequency     vancomycin 1.25 gram/250 mL IVPB in NSS  (Vancomycin IV therapy by Pharmacy Protocol)        \"And\" Linked Group Details    Every 12 hours interval     nicotine (NICODERM CQ) 14 mg/24 hr patch 1 patch         Daily     senna (SENOKOT) tablet 1 tablet         2 times daily     polyethylene glycol (MIRALAX) 17 gram packet 17 g         Daily     magnesium hydroxide (M.O.M.) 400 mg/5 mL suspension 30 mL         Daily PRN     acetaminophen (TYLENOL) tablet 650 mg         Every 4 hours PRN     morphine injection 2 mg         Every 4 hours PRN     aztreonam (AZACTAM) IVPB 1 g in 100 mL NSS vial in bag         Every 8 hours interval     vancomycin 1.5 g/500 mL IVPB in NSS  (IV Antibiotics)         Once     morphine injection 2 mg         Once     ketorolac (TORADOL) injection 15 mg         Once          Anti-infectives (From admission, onward)    Start     Dose/Rate Route Frequency Ordered Stop    08/21/22 1130  vancomycin 1.25 gram/250 mL IVPB in NSS      " "  \"And\" Linked Group Details    1,250 mg  166.7 mL/hr over 90 Minutes intravenous Every 12 hours interval 22 0404      22 0345  aztreonam (AZACTAM) IVPB 1 g in 100 mL NSS vial in bag         1 g  200 mL/hr over 30 Minutes intravenous Every 8 hours interval 22 0334              Objective     Vital Signs:    Patient Vitals for the past 72 hrs:   BP Temp Temp src Pulse Resp SpO2 Height Weight   22 0809 106/60 36.7 °C (98.1 °F) Oral 74 18 99 % -- --   22 0540 130/80 36.8 °C (98.3 °F) Oral 79 18 95 % 1.651 m (5' 5\") 74.4 kg (164 lb)   22 0528 (!) 121/58 -- -- 83 19 98 % 1.651 m (5' 5\") 74.4 kg (164 lb)   22 0444 -- -- -- 82 18 98 % -- --   22 0335 (!) 139/59 -- -- 84 18 97 % -- --   22 2343 (!) 144/72 -- -- 93 15 97 % -- --   22 2042 (!) 143/88 37.4 °C (99.4 °F) -- (!) 110 18 98 % 1.676 m (5' 6\") 74.8 kg (165 lb)       Temp (72hrs), Av.2 °C (98.9 °F), Min:36.7 °C (98.1 °F), Max:37.7 °C (99.9 °F)      Physical Exam:    Alert  No facial swelling otorhinorrhea  Anicteric sclera, pupils reactive EOMI  No stridor or meningismus  Lungs: No respiratory distress  Sinus rhythm  Gluteal area: Marked confluent erythema, tenderness, induration of right gluteal tissue near anal verge with local fluctuation.  Abdomen: Active bowel sounds no distention tenderness guarding pulsation  No acute skin changes appreciated  No acute joint effusions appreciated  Neuro: Gait intact, cranial nerves intact, muscle strength 5/5 upper lower plantars down    Lines, Drains, Airways, Wounds:       Labs:    CBC Results       22     2348 1116 0343    WBC 9.56 4.2 6.04    RBC 4.29 4.72 4.55    HGB 13.4 14.5 13.7    HCT 39.1 43.0 41.9    MCV 91.1 91.1 92.1    MCH 31.2 30.7 30.1    MCHC 34.3 33.7 32.7     262 268      BMP Results       22     2348 1116 0343     141 137    K 4.0 4.5 3.5    Cl 102 106 100    CO2 27 27 27    Glucose 114 " 85 98    BUN 10 10 13    Creatinine 0.7 0.79 0.6    Calcium 9.2 9.3 9.2    Anion Gap 7 -- 10    EGFR >60.0 79 >60.0      92          Comment for K at 2348 on 08/20/22: Results obtained on plasma. Plasma Potassium values may be up to 0.4 mEQ/L less than serum values. The differences may be greater for patients with high platelet or white cell counts.    Comment for K at 0343 on 12/19/21: Results obtained on plasma. Plasma Potassium values may be up to 0.4 mEQ/L less than serum values. The differences may be greater for patients with high platelet or white cell counts.    Comment for Glucose at 1116 on 03/27/22:               Fasting reference interval         Comment for Creatinine at 1116 on 03/27/22: For patients >49 years of age, the reference limit  for Creatinine is approximately 13% higher for people  identified as -American.           PT/PTT Results    No lab values to display.     UA Results       12/19/21     0338    Color Yellow    Clarity Clear    Glucose Negative    Bilirubin Negative    Ketones Negative    Sp Grav <=1.005    Blood Negative    Ph 7.0    Protein Negative    Urobilinogen 0.2    Nitrite Negative    Leuk Est +1    WBC 0 TO 3    RBC 0 TO 4    Bacteria None Seen         Comment for Blood at 0338 on 12/19/21: The sensitivity of the occult blood test is equivalent to approximately 4 intact RBC/HPF.      Lactate Results    No lab values to display.         Microbiology Results     Procedure Component Value Units Date/Time    SARS-CoV-2 (COVID-19), PCR Nasopharynx [[id number]]  (Normal) Collected: 08/20/22 2351    Specimen: Nasopharyngeal Swab from Nasopharynx Updated: 08/21/22 0028    Narrative:      The following orders were created for panel order SARS-CoV-2 (COVID-19), PCR Nasopharynx.  Procedure                               Abnormality         Status                     ---------                               -----------         ------                     SARS-CoV-2 (COVID-19),  P...[[id number]]  Normal              Final result                 Please view results for these tests on the individual orders.    SARS-CoV-2 (COVID-19), PCR Nasopharynx [[id number]]  (Normal) Collected: 08/20/22 2351    Specimen: Nasopharyngeal Swab from Nasopharynx Updated: 08/21/22 0028     SARS-CoV-2 (COVID-19) Negative    Narrative:      Testing performed using real-time PCR for detection of COVID-19. EUA approved validation studies performed on site.           Pathology Results     ** No results found for the last 720 hours. **          Echo:         Imaging:    Radiology Imaging    XR ABDOMEN 1 VW    Narrative  CLINICAL HISTORY: Abdominal bloating.    --    Impression  Fecal matter in the colon.    COMMENT: AP radiographs of the abdomen and pelvis reveal air and fecal matter  within the colon.  There is no definite evidence of small bowel obstruction or  free intraperitoneal air.  Comparison is made to a CT scan dated 7/20/2015.      Assessment   Cellulitis/soft tissue abscess: right gluteal area, imaging study reviewed, recommend surgical opinion    Abx allergy: penicillin-hives,  , Ceftin- local arm pain,  Sulfonamides-rash, ciprofloxacin-joint pain, adjust abx therapy accordingly        Plan     abx mgmt: recommend parental vancomycin    Discussed clinical presentation with pt and floor nursing staff    NOTE: Some or all of the note above was created with the use of dictation software, please note this dictation software can have anomalies in its ability to transcribe. Please contact me directly for anything that seems abnormal for clarification.

## 2022-08-21 NOTE — PLAN OF CARE
Plan of Care Review  Plan of Care Reviewed With: patient  Progress: no change  Outcome Summary: Received AAOx3 patient from ED with complaints of R buttock/groin pain due to abscess/cellulitis. Upon inspection, R buttock is warmth, redness, and tenderness noted with foul odor. Vitals WNL. Pt ambulated to bed, but pain reported when on R side - pt was assisted to lean on left side. Pt nicotine lozenges placed in pt bin as she is a former smoker. Pt to remain NPO. Pt oriented to unit. Bed alarm activated and call bell within reach.

## 2022-08-21 NOTE — PLAN OF CARE
Problem: Adult Inpatient Plan of Care  Goal: Patient-Specific Goal (Individualized)  Flowsheets (Taken 8/21/2022 1450)  Patient-Specific Goals (Include Timeframe): to go home     Case Management Note:     Met with pt in the room, introduced self, verified ID, demographic info, and PCP.  PCP: Ashok Durham  Ph[facility]: [facility]  Housing: Patient lives with Spouse in a 2SH story home with 4 steps to enter: 1 bath on first floor.  PLOF:  Patient is independent with ADL's.   DME: none  Will need to obtain none.  Anticipated Disposition:  Anticipate discharge to home.  No skilled needs identified at this time.    Will continue to follow.    Plan of care discussed with pt.     Tarah Magallon MSW, LCSW

## 2022-08-21 NOTE — PROGRESS NOTES
Vancomycin Dosing by Pharmacy Consult Initiated    Adeola Rees is a 66 y.o. female who has been consulted for vancomycin dosing for bacterial skin and skin structure infection prescribed by Dr. Pa Sanchez.    Reviewed relevant clinical data including weight, renal function, previous vancomycin doses, and vancomycin levels:  Creatinine   Date/Time Value Ref Range Status   08/20/2022 2348 0.7 0.6 - 1.1 mg/dL Final     No results found for: VANCORANDOM, VANCOTROUGH, VANCOPEAK      Vancomycin Administrations (last 96 hours)       Date/Time Action Medication Dose Rate    08/20/22 2354 New Bag    vancomycin 1.5 g/500 mL IVPB in NSS 1,500 mg 250 mL/hr            Assessment/Plan  The patient is ordered vancomycin dosing by pharmacy.    The dose will be based on:         Wt Readings from Last 1 Encounters:   08/20/22 74.8 kg (165 lb)         Estimated Creatinine Clearance: 84 mL/min by (C-G formula)      Patient received a loading dose 1500 mg IV x1 and we will initiate a maintenance dose of 1250 mg IV every 12 hours.    Target a trough of 10-15 ug/mL per vancomycin dosing per pharmacy order.  Pharmacy will continue to follow the patient's vancomycin dosing daily.      Please call vancomycin levels to the pharmacy.  Flores Perla RPh

## 2022-08-21 NOTE — ED PROVIDER NOTES
"Emergency Medicine Note  HPI   HISTORY OF PRESENT ILLNESS     This is a 66-year-old female, who presents ER with buttock pain.  Patient states symptoms been for days, with associated fever 101.  Patient noticed pain in the perirectal area.  No chest pain/expresses nausea or vomiting  Of note, patient is allergic to \"all antibiotics\"      History provided by:  Patient   used: No    Abscess  Location:  Pelvis  Pelvic abscess location:  R buttock  Abscess quality: induration, painful and redness    Red streaking: yes    Progression:  Worsening  Pain details:     Quality:  Throbbing  Ineffective treatments:  Cold compresses  Associated symptoms: fatigue and fever    Associated symptoms: no nausea and no vomiting    Fever  Associated symptoms: chills    Associated symptoms: no diarrhea, no nausea and no vomiting          Patient History   PAST HISTORY     Reviewed from Nursing Triage:  Tobacco  Med Hx  Surg Hx  Fam Hx  Soc Hx        Past Medical History:   Diagnosis Date    Cancer (CMS/[facility])     skin cancer-basal    Celiac disease     Chemical sensitivity     COPD (chronic obstructive pulmonary disease) (CMS/[facility])     Depression     Environmental allergies     Female infertility     H/O gastroesophageal reflux (GERD)     High cholesterol     History of bronchitis     History of sinusitis     History of skin cancer     History of traumatic head injury     Hypertension     Nasal sinus polyp     Osteoarthritis     Osteopenia     Psoriasis     Sleep apnea        Past Surgical History:   Procedure Laterality Date    APPENDECTOMY      APPENDECTOMY  1970    COLONOSCOPY  07/2019    HYSTERECTOMY  1995    OOPHORECTOMY      TONSILLECTOMY      TONSILLECTOMY  1964    UPPER GASTROINTESTINAL ENDOSCOPY  07/2019    WISDOM TOOTH EXTRACTION  1976       Family History   Problem Relation Age of Onset    Hypertension Biological Mother     Obesity Biological Mother     Kidney disease " Biological Mother     Hypertension Biological Father     Obesity Biological Father     Heart disease Biological Father     Diabetes Biological Father     Arthritis Biological Father     Thyroid cancer Biological Sister     Cancer Biological Sister     ADD / ADHD Biological Sister     Hypertension Biological Brother     Eczema Biological Brother     Heart disease Maternal Grandmother     Hypertension Maternal Grandmother     Obesity Maternal Grandmother     Depression Maternal Grandmother     Hypertension Maternal Grandfather     Heart disease Maternal Grandfather     Stroke Maternal Grandfather     Dementia Paternal Grandmother     Diabetes Paternal Grandfather     Cancer Paternal Grandfather        Social History     Tobacco Use    Smoking status: Former Smoker     Packs/day: 1.00     Years: 48.00     Pack years: 48.00     Types: Cigarettes     Start date: 1968     Quit date: 2016     Years since quittin.7    Smokeless tobacco: Never Used    Tobacco comment: Uses lozenges   Vaping Use    Vaping Use: Never used   Substance Use Topics    Alcohol use: Not Currently    Drug use: Never         Review of Systems   REVIEW OF SYSTEMS     Review of Systems   Constitutional: Positive for chills, fatigue and fever.   Respiratory: Negative for chest tightness.    Gastrointestinal: Negative for abdominal pain, diarrhea, nausea and vomiting.   Neurological: Negative for light-headedness.         VITALS     ED Vitals    Date/Time Temp Pulse Resp BP SpO2 [facility]   22 0335 -- 84 18 139/59 97 %    22 2343 -- 93 15 144/72 97 %    22 2042 37.4 °C (99.4 °F) 110 18 143/88 98 % AC        Pulse Ox %: 98 % (22 0250)  Pulse Ox Interpretation: Normal (22 0250)  Heart Rate: 110 (22 0250)  Rhythm Strip Interpretation: Sinus Tachycardia (22 0250)     Physical Exam   PHYSICAL EXAM     Physical Exam  Constitutional:       General: She is awake.      Appearance: She  is well-developed and well-groomed. She is not ill-appearing, toxic-appearing or diaphoretic.   HENT:      Head: Normocephalic and atraumatic.   Cardiovascular:      Rate and Rhythm: Normal rate and regular rhythm.      Heart sounds: Normal heart sounds.   Pulmonary:      Effort: No tachypnea, bradypnea, accessory muscle usage or prolonged expiration.      Breath sounds: Normal breath sounds and air entry.   Abdominal:      General: Abdomen is protuberant.      Palpations: Abdomen is soft.      Tenderness: There is no abdominal tenderness.   Genitourinary:     Comments: 10 to 15 cm area, of erythema on the right buttocks, extending into the right perirectal area  Moderate tenderness  Neurological:      Mental Status: She is alert, oriented to person, place, and time and easily aroused.      GCS: GCS eye subscore is 4. GCS verbal subscore is 5. GCS motor subscore is 6.           PROCEDURES     Procedures     DATA     Results     Procedure Component Value Units Date/Time    Comprehensive metabolic panel [[id number]]  (Abnormal) Collected: 08/20/22 2348    Specimen: Blood, Venous Updated: 08/21/22 0030     Sodium 136 mEQ/L      Potassium 4.0 mEQ/L      Comment: Results obtained on plasma. Plasma Potassium values may be up to 0.4 mEQ/L less than serum values. The differences may be greater for patients with high platelet or white cell counts.        Chloride 102 mEQ/L      CO2 27 mEQ/L      BUN 10 mg/dL      Creatinine 0.7 mg/dL      Glucose 114 mg/dL      Calcium 9.2 mg/dL      AST (SGOT) 23 IU/L      ALT (SGPT) 23 IU/L      Alkaline Phosphatase 71 IU/L      Total Protein 7.1 g/dL      Comment: Test performed on plasma which typically contains approximately 0.4 g/dL more protein than serum.        Albumin 3.8 g/dL      Bilirubin, Total 0.9 mg/dL      eGFR >60.0 mL/min/1.73m*2      Anion Gap 7 mEQ/L     SARS-CoV-2 (COVID-19), PCR Nasopharynx [[id number]]  (Normal) Collected: 08/20/22 6231    Specimen: Nasopharyngeal Swab  from Nasopharynx Updated: 08/21/22 0028    Narrative:      The following orders were created for panel order SARS-CoV-2 (COVID-19), PCR Nasopharynx.  Procedure                               Abnormality         Status                     ---------                               -----------         ------                     SARS-CoV-2 (COVID-19), P...[[id number]]  Normal              Final result                 Please view results for these tests on the individual orders.    SARS-CoV-2 (COVID-19), PCR Nasopharynx [[id number]]  (Normal) Collected: 08/20/22 2351    Specimen: Nasopharyngeal Swab from Nasopharynx Updated: 08/21/22 0028     SARS-CoV-2 (COVID-19) Negative    Narrative:      Testing performed using real-time PCR for detection of COVID-19. EUA approved validation studies performed on site.     CBC and differential [[id number]]  (Abnormal) Collected: 08/20/22 2348    Specimen: Blood, Venous Updated: 08/21/22 0011     WBC 9.56 K/uL      RBC 4.29 M/uL      Hemoglobin 13.4 g/dL      Hematocrit 39.1 %      MCV 91.1 fL      MCH 31.2 pg      MCHC 34.3 g/dL      RDW 12.1 %      Platelets 219 K/uL      MPV 9.1 fL      Differential Type Auto     nRBC 0.0 %      Immature Granulocytes 0.4 %      Neutrophils 64.3 %      Lymphocytes 22.0 %      Monocytes 10.1 %      Eosinophils 2.8 %      Basophils 0.4 %      Immature Granulocytes, Absolute 0.04 K/uL      Neutrophils, Absolute 6.14 K/uL      Lymphocytes, Absolute 2.10 K/uL      Monocytes, Absolute 0.97 K/uL      Eosinophils, Absolute 0.27 K/uL      Basophils, Absolute 0.04 K/uL     Blood Culture Blood, Venous [[id number]] Collected: 08/20/22 2348    Specimen: Blood, Venous Updated: 08/21/22 0007    Blood Culture Blood, Venous [[id number]] Collected: 08/20/22 2348    Specimen: Blood, Venous Updated: 08/21/22 0006          Imaging Results          CT PELVIS WITHOUT IV CONTRAST (Preliminary result)  Result time 08/21/22 03:00:28   Procedure changed from CT ABDOMEN PELVIS WITHOUT  IV CONTRAST     ED Interpretation      History: CELLULITIS PERIRECTAL ABCESS    Preliminary Impression:    Assessment for question of an abscess is limited by the absence of IV contrast.    There is generalized infiltration of subcutaneous tissues in the medial aspect of the right lower buttock. Superficially in the subcutaneous tissues there is additionally a somewhat more consolidated finding, measuring 15 mm in diameter, which may conceivably represent an abscess, although this is not certain. It can be further assessed with ultrasound.    Otherwise negative pelvis.                                EKG 12 lead   ED Interpretation   74 bpm, normal axis deviation, good R wave progression          Scoring tools                                  ED Course & MDM   MDM / ED COURSE / CLINICAL IMPRESSION / DISPO     MDM  Number of Diagnoses or Management Options  Diagnosis management comments: 66-year-old presents here with symptoms above  Imaging, IV antibiotics, pain management  Abscess, cellulitis       Amount and/or Complexity of Data Reviewed  Clinical lab tests: reviewed  Tests in the medicine section of CPT®: reviewed  Independent visualization of images, tracings, or specimens: yes        ED Course as of 08/21/22 0341   Sun Aug 21, 2022   0134 Late note.  Patient states that her scalp was itching and had hives.  Upon evaluation, no welts were noted.  Patient has not no signs of allergic reaction at this time.  We will continue to monitor.  In addition, patient refused IV dye. [EK]   0301  Case discussed with [facility], who accept the patient to their service [EK]      ED Course User Index  [EK] Francia Mccray DO     Clinical Impression      Cellulitis of buttock     Disposition  Admit / Observation         Francia Mccray DO  08/21/22 0341

## 2022-08-21 NOTE — ASSESSMENT & PLAN NOTE
Presented with right buttock pain, redness, swelling.  No fever or leukocytosis.  CT pelvis with induration medial right buttock with small tissue nodule,  Possibly evolving phlegmon or abscess measuring up to 2 cm in diameter.    S/p bedside I&D by general surgery 8/22 and wound packing placed  Packing removed 8/23 and can start sitz baths/showers  Needs outpatient follow-up with surgery Dr Garces in 6 weeks  Pleated 7 days of vancomycin on 8/26  Duration of abx TBD

## 2022-08-21 NOTE — H&P
Hospital Medicine Service -  History & Physical        CHIEF COMPLAINT   Right buttock tenderness.     HISTORY OF PRESENT ILLNESS      Adeola Rees is a 66 y.o. female with a past medical history of COPD secondary to smoking  1 ppd for over 48 years, quit 2016, HTN, celiac disease, few antibiotics allergies including PCN, Cipro, Cephalosporins and Clindamycin, who presents to the ER tonight with c/o having a right buttock tenderness for the last two days. In the ER, her clinical examination shows extensive R-inner buttock cellulitis, getting close to the anal region. Routine labs are WNL. During my interview, Mrs. Rees is AAo x 3, able to make fair conversation, even though she could be tangential at times. She denies any fever, chills, anal pruritus or pain with defecation, then she states she did not have a BM in the last three days.     PAST MEDICAL AND SURGICAL HISTORY      Past Medical History:   Diagnosis Date    Cancer (CMS/HCC)     skin cancer-basal    Celiac disease     Chemical sensitivity     COPD (chronic obstructive pulmonary disease) (CMS/[facility])     Depression     Environmental allergies     Female infertility     H/O gastroesophageal reflux (GERD)     High cholesterol     History of bronchitis     History of sinusitis     History of skin cancer     History of traumatic head injury     Hypertension     Nasal sinus polyp     Osteoarthritis     Osteopenia     Psoriasis     Sleep apnea        Past Surgical History:   Procedure Laterality Date    APPENDECTOMY      APPENDECTOMY  1970    COLONOSCOPY  07/2019    HYSTERECTOMY  1995    OOPHORECTOMY      TONSILLECTOMY      TONSILLECTOMY  1964    UPPER GASTROINTESTINAL ENDOSCOPY  07/2019    WISDOM TOOTH EXTRACTION  1976       PCP: Tamara Valdivia, DO    MEDICATIONS      Prior to Admission medications    Medication Sig Start Date End Date Taking? Authorizing Provider   CALCIUM ORAL Take by mouth. Natural Vitality Calm  and Calcium 1 tsp BID    Sunita Black MD   cholecalciferol, vitamin D3, 5,000 unit (125 mcg) capsule Take 5,000 Units by mouth daily.    Sunita Black MD   cyanocobalamin, vitamin B-12, (VITAMIN B-12 ORAL) Take 5,000 mcg by mouth. Vitamin shoppe    Sunita Black MD   MAGNESIUM ORAL Take by mouth.    Sunita Black MD   nicotine polacrilex (COMMIT) 4 mg lozenge Apply 4 mg to cheek See admin instr. q 5 hours PRN    Sunita Black MD   tretinoin (RETIN-A TOP) Apply topically.    Sunita Black MD       ALLERGIES      Bee sting [bee venom protein (honey bee)], Honey, Penicillin g, Penicillins, Shellfish derived, Sulfa (sulfonamide antibiotics), Clindamycin, Ceftin [cefuroxime axetil], Ciprofibrate, and Ciprofloxacin    FAMILY HISTORY      Family History   Problem Relation Age of Onset    Hypertension Biological Mother     Obesity Biological Mother     Kidney disease Biological Mother     Hypertension Biological Father     Obesity Biological Father     Heart disease Biological Father     Diabetes Biological Father     Arthritis Biological Father     Thyroid cancer Biological Sister     Cancer Biological Sister     ADD / ADHD Biological Sister     Hypertension Biological Brother     Eczema Biological Brother     Heart disease Maternal Grandmother     Hypertension Maternal Grandmother     Obesity Maternal Grandmother     Depression Maternal Grandmother     Hypertension Maternal Grandfather     Heart disease Maternal Grandfather     Stroke Maternal Grandfather     Dementia Paternal Grandmother     Diabetes Paternal Grandfather     Cancer Paternal Grandfather        SOCIAL HISTORY      Social History     Socioeconomic History    Marital status:      Spouse name: Cristopher     Number of children: 2    Years of education: None    Highest education level: None   Occupational History    Occupation: homemaker   Tobacco Use    Smoking status: Former  Smoker     Packs/day: 1.00     Years: 48.00     Pack years: 48.00     Types: Cigarettes     Start date: 1968     Quit date: 2016     Years since quittin.7    Smokeless tobacco: Never Used    Tobacco comment: Uses lozenges   Vaping Use    Vaping Use: Never used   Substance and Sexual Activity    Alcohol use: Not Currently    Drug use: Never    Sexual activity: Defer   Social History Narrative    Lives with her , has two adopted children. (22).     Social Determinants of Health     Food Insecurity: No Food Insecurity    Worried About Running Out of Food in the Last Year: Never true    Ran Out of Food in the Last Year: Never true       REVIEW OF SYSTEMS      All other systems reviewed and negative except as noted in HPI    PHYSICAL EXAMINATION      Temp:  [37.4 °C (99.4 °F)-37.7 °C (99.9 °F)] 37.4 °C (99.4 °F)  Heart Rate:  [] 84  Resp:  [15-18] 18  BP: (122-144)/(59-88) 139/59  Body mass index is 26.63 kg/m².    Physical Exam     HEENT: NCAT, PERRLA,  EOMI, supple neck, clear oropharynx.  Skin: intact, with redness and mild induration over the inner R-buttock region, on a diameter of 12 cm, getting close to the anal margin.  Chest: good expansion with inspiration B/L, symmetric.  Lungs: clear to auscultation B/L.  CV : S1, S2, RRR, no pathologic sounds.  Abdomen: soft, Nt, not distended, no organomegaly, +BS in all four quadrants.  Neuro: AAO x 3, no gross focal deficit.  Extremities: - ECC, fair ROM in all joints B/L.    LABS / IMAGING / EKG        Labs  I have reviewed the patient's pertinent labs. Pertinent labs are within normal limits.    Imaging  I have independently reviewed the pertinent imaging from the last 24 hrs.    SARS-CoV-2 (COVID-19) (no units)   Date/Time Value   2022 2351 Negative       ECG/Telemetry  Telemetry shows a NSR at 84/min.    ASSESSMENT AND PLAN           * Cellulitis of buttock  Assessment & Plan  Will admit the patient to the medical surgical  bed.  Apparently the tenderness started 48 hours ago.  Mrs. Rees denies pain with defecation or perirectal pruritus.  Given IV Vancomycin in the ER, will add IV Aztreonam due to anal vicinity.  ID is consulted.    COPD (chronic obstructive pulmonary disease) (CMS/HCC)  Assessment & Plan  The patient smoked 1 ppd cigarettes for 48 years.  She quit in 2016.  No reports of CP or SOB from the patient tonight.  Will use Duoneb prn.         VTE Assessment: Padua VTE Score: 1  VTE Prophylaxis: SCD while in bed.  Code Status: Full Code  Palliative Care Screening Score: 0   Discussed advanced care planning. Adeola has an ACP and Adeola's surrogate decision maker is her , Mr. Cristopher Rees.  Estimated Discharge Date: 8/22/2022  Disposition Planning: Home.     Pa Sanchez MD  8/21/2022

## 2022-08-22 PROBLEM — Z20.822 EXPOSURE TO COVID-19 VIRUS: Status: ACTIVE | Noted: 2022-08-22

## 2022-08-22 PROBLEM — L02.31 ABSCESS OF RIGHT BUTTOCK: Status: ACTIVE | Noted: 2022-08-21

## 2022-08-22 PROBLEM — F17.200 SMOKER: Status: ACTIVE | Noted: 2020-09-11

## 2022-08-22 LAB
ANION GAP SERPL CALC-SCNC: 5 MEQ/L (ref 3–15)
BASOPHILS # BLD: 0.04 K/UL (ref 0.01–0.1)
BASOPHILS NFR BLD: 0.6 %
BUN SERPL-MCNC: 10 MG/DL (ref 8–20)
CALCIUM SERPL-MCNC: 8.9 MG/DL (ref 8.9–10.3)
CHLORIDE SERPL-SCNC: 103 MEQ/L (ref 98–109)
CO2 SERPL-SCNC: 28 MEQ/L (ref 22–32)
CREAT SERPL-MCNC: 0.7 MG/DL (ref 0.6–1.1)
DATE+TIME DOSE: 2335
DATE+TIME DOSE: NORMAL
DIFFERENTIAL METHOD BLD: ABNORMAL
EOSINOPHIL # BLD: 0.32 K/UL (ref 0.04–0.36)
EOSINOPHIL NFR BLD: 4.5 %
ERYTHROCYTE [DISTWIDTH] IN BLOOD BY AUTOMATED COUNT: 12.1 % (ref 11.7–14.4)
GFR SERPL CREATININE-BSD FRML MDRD: >60 ML/MIN/1.73M*2
GLUCOSE SERPL-MCNC: 93 MG/DL (ref 70–99)
HCT VFR BLDCO AUTO: 36.5 % (ref 35–45)
HGB BLD-MCNC: 12.2 G/DL (ref 11.8–15.7)
IMM GRANULOCYTES # BLD AUTO: 0.04 K/UL (ref 0–0.08)
IMM GRANULOCYTES NFR BLD AUTO: 0.6 %
LYMPHOCYTES # BLD: 1.22 K/UL (ref 1.2–3.5)
LYMPHOCYTES NFR BLD: 17.1 %
MCH RBC QN AUTO: 31.4 PG (ref 28–33.2)
MCHC RBC AUTO-ENTMCNC: 33.4 G/DL (ref 32.2–35.5)
MCV RBC AUTO: 93.8 FL (ref 83–98)
MONOCYTES # BLD: 0.88 K/UL (ref 0.28–0.8)
MONOCYTES NFR BLD: 12.3 %
NEUTROPHILS # BLD: 4.64 K/UL (ref 1.7–7)
NEUTS SEG NFR BLD: 64.9 %
NRBC BLD-RTO: 0 %
PDW BLD AUTO: 9 FL (ref 9.4–12.3)
PLATELET # BLD AUTO: 206 K/UL (ref 150–369)
POTASSIUM SERPL-SCNC: 3.9 MEQ/L (ref 3.6–5.1)
RBC # BLD AUTO: 3.89 M/UL (ref 3.93–5.22)
SARS-COV-2 RNA RESP QL NAA+PROBE: NEGATIVE
SODIUM SERPL-SCNC: 136 MEQ/L (ref 136–144)
VANCOMYCIN TROUGH SERPL-MCNC: 13.7 UG/ML (ref 10–15)
WBC # BLD AUTO: 7.14 K/UL (ref 3.8–10.5)

## 2022-08-22 PROCEDURE — 85025 COMPLETE CBC W/AUTO DIFF WBC: CPT | Performed by: HOSPITALIST

## 2022-08-22 PROCEDURE — U0003 INFECTIOUS AGENT DETECTION BY NUCLEIC ACID (DNA OR RNA); SEVERE ACUTE RESPIRATORY SYNDROME CORONAVIRUS 2 (SARS-COV-2) (CORONAVIRUS DISEASE [COVID-19]), AMPLIFIED PROBE TECHNIQUE, MAKING USE OF HIGH THROUGHPUT TECHNOLOGIES AS DESCRIBED BY CMS-2020-01-R: HCPCS | Performed by: PHYSICIAN ASSISTANT

## 2022-08-22 PROCEDURE — 63700000 HC SELF-ADMINISTRABLE DRUG: Performed by: HOSPITALIST

## 2022-08-22 PROCEDURE — 63600000 HC DRUGS/DETAIL CODE: Performed by: PHYSICIAN ASSISTANT

## 2022-08-22 PROCEDURE — 63600000 HC DRUGS/DETAIL CODE: Performed by: HOSPITALIST

## 2022-08-22 PROCEDURE — 80202 ASSAY OF VANCOMYCIN: CPT | Performed by: HOSPITALIST

## 2022-08-22 PROCEDURE — 82310 ASSAY OF CALCIUM: CPT | Performed by: HOSPITALIST

## 2022-08-22 PROCEDURE — 25000000 HC PHARMACY GENERAL: Performed by: HOSPITALIST

## 2022-08-22 PROCEDURE — 25800000 HC PHARMACY IV SOLUTIONS: Performed by: HOSPITALIST

## 2022-08-22 PROCEDURE — 80048 BASIC METABOLIC PNL TOTAL CA: CPT | Performed by: HOSPITALIST

## 2022-08-22 PROCEDURE — 12000000 HC ROOM AND CARE MED/SURG

## 2022-08-22 PROCEDURE — 25000000 HC PHARMACY GENERAL: Performed by: SURGERY

## 2022-08-22 PROCEDURE — 0H98XZX DRAINAGE OF BUTTOCK SKIN, EXTERNAL APPROACH, DIAGNOSTIC: ICD-10-PCS | Performed by: SURGERY

## 2022-08-22 PROCEDURE — 99233 SBSQ HOSP IP/OBS HIGH 50: CPT | Performed by: HOSPITALIST

## 2022-08-22 PROCEDURE — 99253 IP/OBS CNSLTJ NEW/EST LOW 45: CPT | Mod: 25 | Performed by: SURGERY

## 2022-08-22 PROCEDURE — 36415 COLL VENOUS BLD VENIPUNCTURE: CPT | Performed by: HOSPITALIST

## 2022-08-22 PROCEDURE — 10061 I&D ABSCESS COMP/MULTIPLE: CPT | Performed by: SURGERY

## 2022-08-22 RX ORDER — DIPHENHYDRAMINE HCL 50 MG/ML
25 VIAL (ML) INJECTION EVERY 6 HOURS PRN
Status: DISCONTINUED | OUTPATIENT
Start: 2022-08-22 | End: 2022-08-26 | Stop reason: HOSPADM

## 2022-08-22 RX ORDER — LIDOCAINE HYDROCHLORIDE 10 MG/ML
30 INJECTION, SOLUTION INFILTRATION; PERINEURAL ONCE
Status: COMPLETED | OUTPATIENT
Start: 2022-08-22 | End: 2022-08-22

## 2022-08-22 RX ORDER — DIPHENHYDRAMINE HCL 25 MG
25 CAPSULE ORAL ONCE
Status: COMPLETED | OUTPATIENT
Start: 2022-08-22 | End: 2022-08-22

## 2022-08-22 RX ORDER — MORPHINE SULFATE 2 MG/ML
2 INJECTION, SOLUTION INTRAMUSCULAR; INTRAVENOUS EVERY 4 HOURS PRN
Status: DISCONTINUED | OUTPATIENT
Start: 2022-08-22 | End: 2022-08-26 | Stop reason: HOSPADM

## 2022-08-22 RX ORDER — OXYCODONE HYDROCHLORIDE 5 MG/1
5 TABLET ORAL EVERY 4 HOURS PRN
Status: DISCONTINUED | OUTPATIENT
Start: 2022-08-22 | End: 2022-08-26 | Stop reason: HOSPADM

## 2022-08-22 RX ADMIN — NICOTINE 1 PATCH: 14 PATCH, EXTENDED RELEASE TRANSDERMAL at 08:49

## 2022-08-22 RX ADMIN — DIPHENHYDRAMINE HYDROCHLORIDE 25 MG: 25 CAPSULE ORAL at 01:20

## 2022-08-22 RX ADMIN — MORPHINE SULFATE 2 MG: 2 INJECTION, SOLUTION INTRAMUSCULAR; INTRAVENOUS at 05:47

## 2022-08-22 RX ADMIN — LIDOCAINE HYDROCHLORIDE 30 ML: 10 INJECTION, SOLUTION INFILTRATION; PERINEURAL at 12:52

## 2022-08-22 RX ADMIN — SENNOSIDES 1 TABLET: 8.6 TABLET, FILM COATED ORAL at 08:49

## 2022-08-22 RX ADMIN — VANCOMYCIN HYDROCHLORIDE 1250 MG: 500 INJECTION, POWDER, LYOPHILIZED, FOR SOLUTION INTRAVENOUS at 12:35

## 2022-08-22 RX ADMIN — VANCOMYCIN HYDROCHLORIDE 1250 MG: 500 INJECTION, POWDER, LYOPHILIZED, FOR SOLUTION INTRAVENOUS at 22:43

## 2022-08-22 RX ADMIN — AZTREONAM 1 G: 1 INJECTION, POWDER, LYOPHILIZED, FOR SOLUTION INTRAMUSCULAR; INTRAVENOUS at 03:27

## 2022-08-22 RX ADMIN — DIPHENHYDRAMINE HYDROCHLORIDE 25 MG: 50 INJECTION, SOLUTION INTRAMUSCULAR; INTRAVENOUS at 18:39

## 2022-08-22 RX ADMIN — AZTREONAM 1 G: 1 INJECTION, POWDER, LYOPHILIZED, FOR SOLUTION INTRAMUSCULAR; INTRAVENOUS at 18:46

## 2022-08-22 RX ADMIN — MORPHINE SULFATE 2 MG: 2 INJECTION, SOLUTION INTRAMUSCULAR; INTRAVENOUS at 18:39

## 2022-08-22 RX ADMIN — MORPHINE SULFATE 2 MG: 2 INJECTION, SOLUTION INTRAMUSCULAR; INTRAVENOUS at 11:55

## 2022-08-22 RX ADMIN — SENNOSIDES 1 TABLET: 8.6 TABLET, FILM COATED ORAL at 20:58

## 2022-08-22 RX ADMIN — AZTREONAM 1 G: 1 INJECTION, POWDER, LYOPHILIZED, FOR SOLUTION INTRAMUSCULAR; INTRAVENOUS at 11:55

## 2022-08-22 ASSESSMENT — ENCOUNTER SYMPTOMS
BRUISES/BLEEDS EASILY: 0
COLOR CHANGE: 1
COUGH: 0
ENDOCRINE NEGATIVE: 1
DIARRHEA: 0
APPETITE CHANGE: 0
ABDOMINAL PAIN: 0
STRIDOR: 0
CONSTIPATION: 1
FATIGUE: 0
BLOOD IN STOOL: 0
SHORTNESS OF BREATH: 0
CHILLS: 0
WOUND: 1
CONFUSION: 0
RECTAL PAIN: 0
HEMATURIA: 0
ALLERGIC/IMMUNOLOGIC NEGATIVE: 1
EYES NEGATIVE: 1
MUSCULOSKELETAL NEGATIVE: 1
ACTIVITY CHANGE: 0
UNEXPECTED WEIGHT CHANGE: 0
HEADACHES: 0
DIFFICULTY URINATING: 0
FEVER: 0

## 2022-08-22 NOTE — PROGRESS NOTES
Vancomycin Dosing by Pharmacy Consult Follow up    Adeola Rees is a 66 y.o. female who has been consulted for vancomycin dosing for bacterial skin and skin structure infection.    Reviewed relevant clinical data including weight, renal function, previous vancomycin doses, and vancomycin levels  Creatinine   Date/Time Value Ref Range Status   08/22/2022 1044 0.7 0.6 - 1.1 mg/dL Final   08/20/2022 2348 0.7 0.6 - 1.1 mg/dL Final     Vancomycin Tr   Date/Time Value Ref Range Status   08/22/2022 1034 13.7 10.0 - 15.0 ug/mL Final     Comment:     For all patients with complicated infections with methicillin resistant Staphylococcus aureus (MRSA), e.g. endocarditis, osteomyelitis, meningitis, pneumonia; the vancomycin trough therpeutic range is 15-20 ug/mL.          Vancomycin Administrations (last 96 hours)       Date/Time Action Medication Dose Rate    08/21/22 2335 New Bag    vancomycin 1.25 gram/250 mL IVPB in NSS 1,250 mg 166.7 mL/hr    08/21/22 1215 New Bag    vancomycin 1.25 gram/250 mL IVPB in NSS 1,250 mg 166.7 mL/hr    08/20/22 2354 New Bag    vancomycin 1.5 g/500 mL IVPB in NSS 1,500 mg 250 mL/hr              Assessment/Plan  The patient is ordered vancomycin dosing by pharmacy.      The patients renal function; is stable    Vancomycin trough level 13.7 on maintenance dose of 1250 mg IV Q 12 H with a goal trough 10-15 ug/mL.      Will continue vancomycin 1250 mg IV Q 12 H .      Next trough:  8/24/22 @ 10:30    Pharmacy will continue to follow the patients vancomycin dosing daily during this course of therapy.      Please call vancomycin levels to the pharmacy.  Nayan Lewis [facility]

## 2022-08-22 NOTE — ASSESSMENT & PLAN NOTE
Adeola Rees may have been exposed to COVID 19 while in the hospital.      Adeola Rees is currently hospitalized.      The patient  was notified of the potential exposure by Alana Ayoub PA-C.  The patient currently does not have symptoms.      The patient  express(es) understanding of the potential exposure and all questions were answered.      The patient  was educated on the signs and symptoms of COVID 19.  The patient was recommended to quarantine for 10 days.  They were also educated on when to call their PCP or potentially call 911 if an emergency.      We offered COVID testing after the potential exposure.  The patient  does want follow up testing and we will help facilitate testing for the patient.      Jose May MD    Covid-19 PCR negative on 8/20 and 8/22  Day 5 Covid test on 8/25 negative

## 2022-08-22 NOTE — PROGRESS NOTES
Hospital Medicine Service -  Daily Progress Note       SUBJECTIVE   Interval History: Patient seen and examined this AM.  She has been anxious and restless overnight.  Complaining of 10/10 right buttock pain today which radiates to her groin.  Denies any difficulty urinating or moving her bowels, but states she has chronic bloating.  No fevers or chills.    Chart reviewed. Care discussed with patient, nurse, attending.     OBJECTIVE      Vital signs in last 24 hours:  Temp:  [36.5 °C (97.7 °F)-37.8 °C (100.1 °F)] 36.7 °C (98.1 °F)  Heart Rate:  [] 95  Resp:  [18] 18  BP: (105-135)/(56-78) 130/57  No intake or output data in the 24 hours ending 08/22/22 1620    PHYSICAL EXAMINATION        General:  Anxious appearing, mild acute distress pacing around room  Head:  normocephalic, atraumatic  Eyes:  conjunctiva clear, sclera anicteric  ENT:  mucosa moist  Neck:  supple, no thyroid enlargement  Heart:  regular rate, regular rhythm, no murmur  Lungs:  clear bilaterally, no wheezing rales rhonchi crackles  Abd:  soft, NT, ND, normal BS,   :  no rinaldi  Extremities:  no edema, pulses palpable  MSK:  no gross joint deformities or swelling  Skin:  Right buttock with erythema edema tenderness surrounding dry nondraining wound  Neuro:  AAO x3, grossly non-focal   Pysch:  anxious, cooperative      LINES, CATHETERS, DRAINS, AIRWAYS, AND WOUNDS   Lines, Drains, and Airways:  Wounds (agree with documentation and present on admission):  Peripheral IV (Adult) 08/21/22 Anterior;Left;Proximal Forearm (Active)   Number of days: 1       Comments:         LABS / IMAGING / TELE      Labs  Results from last 7 days   Lab Units 08/22/22  1044 08/20/22  2348   SODIUM mEQ/L 136 136   POTASSIUM mEQ/L 3.9 4.0   CHLORIDE mEQ/L 103 102   CO2 mEQ/L 28 27   BUN mg/dL 10 10   CREATININE mg/dL 0.7 0.7   CALCIUM mg/dL 8.9 9.2   ALBUMIN g/dL  --  3.8   BILIRUBIN TOTAL mg/dL  --  0.9   ALK PHOS IU/L  --  71   ALT IU/L  --  23   AST IU/L  --  23    GLUCOSE mg/dL 93 114*     Results from last 7 days   Lab Units 08/22/22  1044 08/20/22  2348   WBC K/uL 7.14 9.56   HEMOGLOBIN g/dL 12.2 13.4   HEMATOCRIT % 36.5 39.1   PLATELETS K/uL 206 219             SARS-CoV-2 (COVID-19) (no units)   Date/Time Value   08/22/2022 1502 Negative       Imaging  CT PELVIS WITHOUT IV CONTRAST    Result Date: 8/21/2022  IMPRESSION: There is induration in the medial right buttock with a small soft tissue nodule, possibly an evolving phlegmon or abscess which measures up to 2 cm in diameter. No soft tissue gas.  No discernible association with the anorectal region which can be confirmed with perianal fistula protocol MR imaging, as indicated. Preliminary results were provided by Dr. Wakefield at 2:44 AM on 8/21/2022. Comparison study:  Abdominopelvic CT July 2015 COMMENT:  Axial CT images of the pelvis are completed without oral or intravenous contrast. Images are reconstructed in sagittal and coronal planes. CT DOSE:  One or more dose reduction techniques (e.g. automated exposure control, adjustment of the mA and/or kV according to patient size, use of iterative reconstruction technique) utilized for this examination. There is marked induration in the medial right buttock extending to the gluteal crease.  There is no approximately 2 cm area of more focal, somewhat rounded soft tissue attenuation could represent an evolving phlegmon/abscess.  There is no discernible connection to the anorectal region to support perianal fistula although this is best evaluated with perianal fistula protocol MRI. No soft tissue gas or visible opening to the skin Moderately distended urinary bladder. Uterus surgically absent. No adnexal mass. Colonic diverticula noted. No adenopathy in the imaged volume. There is multilevel degenerative disc disease throughout the imaged lumbar spine, again noting advanced disc space narrowing at the lumbosacral junction, with stable findings since 2015.  There is a  stable degenerative anterolisthesis of L4 on L5.      ECG/Telemetry  N/A    ASSESSMENT AND PLAN      * Abscess of right buttock  Assessment & Plan  Presented with right buttock pain, redness, swelling.  No fever or leukocytosis.  CT pelvis with induration medial right buttock with small tissue nodule, possibly evolving phlegmon or abscess measuring up to 2 cm in diameter.    S/p bedside I&D by general surgery today and wound packing placed  Start sitz bath's tomorrow after packing removed  Continue IV aztreonam and vancomycin pending ID input  No wound cultures obtained  Follow-up blood cultures, negative to date  Continue lowest effective pain control      Smoker  Assessment & Plan  Continue nicotine patch  Smoking cessation    Constipation  Assessment & Plan  Continue bowel regimen         VTE Assessment: Padua VTE Score: 1  VTE Prophylaxis:  Current anticoagulants:    None      Code Status: Full Code  Palliative Care Screening Score: 0   Estimated Discharge Date: 8/23/2022     Disposition Planning: pending improvemement    *Recommendations are not final until co-signed by attending*     DONTA Hutchins  8/22/2022

## 2022-08-22 NOTE — PROGRESS NOTES
"Infectious Disease Progress Note    Patient Name: Adeola Rees  MR#: [id number]  : 1955  Admission Date: 2022  Date: 22   Time: 3:02 PM   Author: RAI Hinojosa    Major Events:     Antibiotics:    Anti-infectives (From admission, onward)    Start     Dose/Rate Route Frequency Ordered Stop    22 1130  vancomycin 1.25 gram/250 mL IVPB in NSS        \"And\" Linked Group Details    1,250 mg  166.7 mL/hr over 90 Minutes intravenous Every 12 hours interval 22 0404      22 0345  aztreonam (AZACTAM) IVPB 1 g in 100 mL NSS vial in bag         1 g  200 mL/hr over 30 Minutes intravenous Every 8 hours interval 22 0334            Subjective   Patient states she is still having a lot of soreness in the buttocks, reports the doctor incised the area today, d/w nursing     Review of Systems    Pertinent items are noted in HPI.    Objective     Vital Signs:    Patient Vitals for the past 72 hrs:   BP Temp Temp src Pulse Resp SpO2 Height Weight   22 0900 -- -- -- 86 -- 99 % -- --   22 0804 105/63 37.3 °C (99.1 °F) Oral 86 18 99 % -- --   22 0000 (!) 125/56 37.1 °C (98.8 °F) Oral 84 18 96 % -- --   22 1900 135/78 37.8 °C (100.1 °F) Oral 100 18 100 % -- --   22 1637 116/66 36.5 °C (97.7 °F) Oral 77 18 98 % -- --   22 0809 106/60 36.7 °C (98.1 °F) Oral 74 18 99 % -- --   22 0540 130/80 36.8 °C (98.3 °F) Oral 79 18 95 % 1.651 m (5' 5\") 74.4 kg (164 lb)   22 0528 (!) 121/58 -- -- 83 19 98 % 1.651 m (5' 5\") 74.4 kg (164 lb)   22 0444 -- -- -- 82 18 98 % -- --   22 0335 (!) 139/59 -- -- 84 18 97 % -- --   22 2343 (!) 144/72 -- -- 93 15 97 % -- --   22 2042 (!) 143/88 37.4 °C (99.4 °F) -- (!) 110 18 98 % 1.676 m (5' 6\") 74.8 kg (165 lb)       Temp (72hrs), Av.2 °C (98.9 °F), Min:36.5 °C (97.7 °F), Max:37.8 °C (100.1 °F)      Physical Exam:    Visit Vitals  BP (!) 130/57 (BP Location: Right upper arm, Patient " "Position: Lying)   Pulse 95   Temp 36.7 °C (98.1 °F) (Oral)   Resp 18   Ht 1.651 m (5' 5\")   Wt 74.4 kg (164 lb)   LMP  (LMP Unknown)   SpO2 100%   Breastfeeding No   BMI 27.29 kg/m²     General appearance: alert, appears stated age, cooperative and no distress  Lungs: nonlabored, no cough observed  Extremities: normal ROM  Skin: right gluteal area with erythema, induration, tenderness, edema and sanguinous drainage  Neurologic: Mental status: Alert, oriented, thought content appropriate    Lines, Drains, Airways, Wounds:  Peripheral IV (Adult) 08/21/22 Anterior;Left;Proximal Forearm (Active)   Number of days: 1       Labs:    CBC Results       08/22/22 08/20/22 03/27/22     1044 2348 1116    WBC 7.14 9.56 4.2    RBC 3.89 4.29 4.72    HGB 12.2 13.4 14.5    HCT 36.5 39.1 43.0    MCV 93.8 91.1 91.1    MCH 31.4 31.2 30.7    MCHC 33.4 34.3 33.7     219 262      BMP Results       08/22/22 08/20/22 03/27/22     1044 2348 1116     136 141    K 3.9 4.0 4.5    Cl 103 102 106    CO2 28 27 27    Glucose 93 114 85    BUN 10 10 10    Creatinine 0.7 0.7 0.79    Calcium 8.9 9.2 9.3    Anion Gap 5 7 --    EGFR >60.0 >60.0 79       92         Comment for K at 2348 on 08/20/22: Results obtained on plasma. Plasma Potassium values may be up to 0.4 mEQ/L less than serum values. The differences may be greater for patients with high platelet or white cell counts.    Comment for Glucose at 1116 on 03/27/22:               Fasting reference interval         Comment for Creatinine at 1116 on 03/27/22: For patients >49 years of age, the reference limit  for Creatinine is approximately 13% higher for people  identified as -American.           PT/PTT Results    No lab values to display.     UA Results       12/19/21     0338    Color Yellow    Clarity Clear    Glucose Negative    Bilirubin Negative    Ketones Negative    Sp Grav <=1.005    Blood Negative    Ph 7.0    Protein Negative    Urobilinogen 0.2    Nitrite Negative    " Leuk Est +1    WBC 0 TO 3    RBC 0 TO 4    Bacteria None Seen         Comment for Blood at 0338 on 12/19/21: The sensitivity of the occult blood test is equivalent to approximately 4 intact RBC/HPF.      Lactate Results    No lab values to display.         Microbiology Results     Procedure Component Value Units Date/Time    SARS-CoV-2 (COVID-19), PCR Nasopharynx [[id number]]  (Normal) Collected: 08/20/22 2351    Specimen: Nasopharyngeal Swab from Nasopharynx Updated: 08/21/22 0028    Narrative:      The following orders were created for panel order SARS-CoV-2 (COVID-19), PCR Nasopharynx.  Procedure                               Abnormality         Status                     ---------                               -----------         ------                     SARS-CoV-2 (COVID-19), P...[[id number]]  Normal              Final result                 Please view results for these tests on the individual orders.    SARS-CoV-2 (COVID-19), PCR Nasopharynx [[id number]]  (Normal) Collected: 08/20/22 2351    Specimen: Nasopharyngeal Swab from Nasopharynx Updated: 08/21/22 0028     SARS-CoV-2 (COVID-19) Negative    Narrative:      Testing performed using real-time PCR for detection of COVID-19. EUA approved validation studies performed on site.     Blood Culture Blood, Venous [[id number]]  (Normal) Collected: 08/20/22 2348    Specimen: Blood, Venous Updated: 08/22/22 0402     Culture No growth at 18-24 hours    Blood Culture Blood, Venous [[id number]]  (Normal) Collected: 08/20/22 2348    Specimen: Blood, Venous Updated: 08/22/22 0402     Culture No growth at 18-24 hours          Pathology Results     ** No results found for the last 720 hours. **          Echo:         Imaging:    Radiology Imaging    XR ABDOMEN 1 VW    Narrative  CLINICAL HISTORY: Abdominal bloating.    --    Impression  Fecal matter in the colon.    COMMENT: AP radiographs of the abdomen and pelvis reveal air and fecal matter  within the colon.  There is no  definite evidence of small bowel obstruction or  free intraperitoneal air.  Comparison is made to a CT scan dated 7/20/2015.      Assessment     Right gluteal cellulitis and abscess   - surgery following, s/p bedside I&D today  - no drainage cultures obtained thus far, wound packed by surgery, packing may be removed tomorrow and patient to begin sitz baths  - low grade temps intermittent  - no leukocytosis  - CT reviewed  - blood c/s sterile so far    COVID 19 Exposure- on isolation, today's SARS CoV2 PCR pending  - no hypoxia     Allergy to penicillin caused hives, Ceftin caused local arm pain, Sulfonamides caused rash, ciprofloxacin caused joint pain          Plan     On Aztreonam and Vanco, will discuss with Dr Webb

## 2022-08-22 NOTE — PLAN OF CARE
"Plan of Care Review  Plan of Care Reviewed With: patient  Progress: no change  Outcome Summary: 0322 - Pt AAOx2-3 with anxious and restless behavior noted throughout shift. Pt complaining of sharing room with roommate and plan of care. Pt refused SCDs, but  ambulated periodically in the hallway and to/from bathroom. Pt complained of pruritis and feeling \"germy\" believes she is allergic to IV abx - Dr. Sanchez made aware and PO benadryl was administered. New peripheral IV placed in LAC without complication and IV abx administered. Moderate to severe pain reported in R buttock and perirectal area - PRN pain medication given. Vitals WNL. Pt had BM and stated it was \"sticky\" - senna given. Vanco trough due @ 10:30 (8/22). Nicotine patch still present on MEREDITH. Safety precautions maintained. Bed in low position with alarm activated and call bell within reach.     0519 - pt refused to get AM labs drawn.   "

## 2022-08-22 NOTE — OP NOTE
Preoperative diagnosis: Right buttock abscess  Postoperative diagnosis: Right buttock abscess  Procedure: Incision and drainage of right buttock abscess  Surgeon:  Freedom Garces M.D.    Anesthesia: Local anesthesia.  1% Xylocaine  Estimated blood loss: None  Specimen: None  Drains: None    Indications for operation: Patient is a 66-year-old female who developed a right buttock abscess and was admitted for same.  Although the CAT scan did not show a definite collection she did appear to have a small abscess which required drainage.  Patient did give verbal consent for this at bedside.  I did explain to the patient and her  that there is a possibility this could represent a perirectal abscess    Description of procedure: Patient was placed in left lateral acute position.  The buttock was prepped with Betadine.  Anesthesia a consisting of 1% Xylocaine was infiltrated locally.  An overlying ellipse of what appeared to be necrotic dermis was then excised to allow for good drainage.  The abscess cavity was probed with a hemostat to break up any loculations.  Cloudy bloody fluid drained.  The opening was packed loosely with gauze.  A dressing was applied.

## 2022-08-22 NOTE — PATIENT CARE CONFERENCE
Care Progression Rounds Note  Date: 8/22/2022  Time: 10:59 AM     Patient Name: Adeola Rees     Medical Record Number: [id number]   YOB: 1955  Sex: Female      Room/Bed: 4211D    Admitting Diagnosis: Cellulitis of buttock [L03.317]   Admit Date/Time: 8/20/2022 10:51 PM    Primary Diagnosis: Cellulitis of buttock  Principal Problem: Cellulitis of buttock    GMLOS: pending  Anticipated Discharge Date: 8/23/2022    AM-PAC:  Mobility Score:      Discharge Planning:  Current Living Arrangements: home  Anticipated Discharge Disposition: home without assistance or services, home with home health    Barriers to Discharge:  Medical issues not resolved, Consult pending    Comments:  possible abcess, IV vanco, sx consulted    Participants:  , nursing, social work/services

## 2022-08-22 NOTE — CONSULTS
General Surgery Consult    Subjective   Adeola is a 66 y.o. female who was admitted for Cellulitis of buttock [L03.317]. Patient was seen in consultation at the request of referring physician for management recommendations.  Adeola presented with increasing pain of the right buttock for the last 3 days. She states the pain has been increasing with redness.  Denies fever or chills. No previous similar episodes.  Last BM 4 days ago.  No bloody bowel movements. Denies recent trauma.  No recent antibiotic use. [place] [facility] [place] of the right buttock.      Medical History:   Past Medical History:   Diagnosis Date    Cancer (CMS/[facility])     skin cancer-basal    Celiac disease     Chemical sensitivity     COPD (chronic obstructive pulmonary disease) (CMS/[facility])     Depression     Environmental allergies     Female infertility     H/O gastroesophageal reflux (GERD)     High cholesterol     History of bronchitis     History of sinusitis     History of skin cancer     History of traumatic head injury     Hypertension     Nasal sinus polyp     Osteoarthritis     Osteopenia     Psoriasis     Sleep apnea        Surgical History:   Past Surgical History:   Procedure Laterality Date    APPENDECTOMY      APPENDECTOMY  1970    COLONOSCOPY  2019    HYSTERECTOMY      OOPHORECTOMY      TONSILLECTOMY      TONSILLECTOMY  1964    UPPER GASTROINTESTINAL ENDOSCOPY  2019    WISDOM TOOTH EXTRACTION  1976       Social History:   Social History     Tobacco Use    Smoking status: Former Smoker     Packs/day: 1.00     Years: 48.00     Pack years: 48.00     Types: Cigarettes     Start date: 1968     Quit date: 2016     Years since quittin.7    Smokeless tobacco: Never Used    Tobacco comment: Uses lozenges   Vaping Use    Vaping Use: Never used   Substance Use Topics    Alcohol use: Not Currently    Drug use: Never       Family History:   Family History   Problem Relation Age of Onset     Hypertension Biological Mother     Obesity Biological Mother     Kidney disease Biological Mother     Hypertension Biological Father     Obesity Biological Father     Heart disease Biological Father     Diabetes Biological Father     Arthritis Biological Father     Thyroid cancer Biological Sister     Cancer Biological Sister     ADD / ADHD Biological Sister     Hypertension Biological Brother     Eczema Biological Brother     Heart disease Maternal Grandmother     Hypertension Maternal Grandmother     Obesity Maternal Grandmother     Depression Maternal Grandmother     Hypertension Maternal Grandfather     Heart disease Maternal Grandfather     Stroke Maternal Grandfather     Dementia Paternal Grandmother     Diabetes Paternal Grandfather     Cancer Paternal Grandfather        Allergies: Bee sting [bee venom protein (honey bee)], Honey, Penicillins, Shellfish derived, Sulfa (sulfonamide antibiotics), Clindamycin, Ceftin [cefuroxime axetil], Ciprofibrate, and Ciprofloxacin    Home Medications:     CALCIUM ORAL, Take by mouth. Natural Vitality Calm and Calcium 1 tsp BID    cholecalciferol (vitamin D3), Take 5,000 Units by mouth daily.    cyanocobalamin, vitamin B-12, (VITAMIN B-12 ORAL), Take 5,000 mcg by mouth. Vitamin shoppe    MAGNESIUM ORAL, Take by mouth.    nicotine polacrilex, Apply 4 mg to cheek See admin instr. q 5 hours PRN    tretinoin (RETIN-A TOP), Apply topically.    Current Medications:    acetaminophen, 650 mg, oral, q4h PRN    aztreonam, 1 g, intravenous, q8h INT    magnesium hydroxide, 30 mL, oral, Daily PRN    oxyCODONE, 5 mg, oral, q4h PRN **AND** morphine, 2 mg, intravenous, q4h PRN    nicotine, 1 patch, transdermal, Daily    polyethylene glycol, 17 g, oral, Daily    senna, 1 tablet, oral, BID    vancomycin, 1,250 mg, intravenous, q12h INT **AND** [] IV Vancomycin Therapy by Pharmacy Protocol, , , Once    Review of Systems  Review of Systems    Constitutional: Negative for activity change, appetite change, chills, fatigue, fever and unexpected weight change.   HENT: Negative.    Eyes: Negative.    Respiratory: Negative for cough, shortness of breath and stridor.    Cardiovascular: Negative for chest pain.   Gastrointestinal: Positive for constipation. Negative for abdominal pain, blood in stool, diarrhea and rectal pain.   Endocrine: Negative.    Genitourinary: Negative for difficulty urinating, hematuria and pelvic pain.   Musculoskeletal: Negative.    Skin: Positive for color change and wound.   Allergic/Immunologic: Negative.    Neurological: Negative for headaches.   Hematological: Does not bruise/bleed easily.   Psychiatric/Behavioral: Negative for confusion.       Objective     Vitals:    08/21/22 1900 08/22/22 0000 08/22/22 0804 08/22/22 0900   BP: 135/78 (!) 125/56 105/63    BP Location: Right upper arm Right upper arm Left upper arm    Patient Position: Lying Lying Lying    Pulse: 100 84 86 86   Resp: 18 18 18    Temp: 37.8 °C (100.1 °F) 37.1 °C (98.8 °F) 37.3 °C (99.1 °F)    TempSrc: Oral Oral Oral    SpO2: 100% 96% 99% 99%   Weight:       Height:         No intake/output data recorded.     Physical Exam  Physical Exam  Vitals and nursing note reviewed.   Constitutional:       Appearance: She is normal weight.   HENT:      Head: Normocephalic and atraumatic.      Right Ear: External ear normal.      Left Ear: External ear normal.      Mouth/Throat:      Mouth: Mucous membranes are moist.      Pharynx: No oropharyngeal exudate.   Eyes:      General: No scleral icterus.     Conjunctiva/sclera: Conjunctivae normal.   Cardiovascular:      Rate and Rhythm: Normal rate.      Pulses: Normal pulses.   Pulmonary:      Effort: Pulmonary effort is normal.      Breath sounds: Normal breath sounds.   Abdominal:      General: There is no distension.      Palpations: Abdomen is soft.      Tenderness: There is no abdominal tenderness. There is no guarding or  rebound.   Musculoskeletal:         General: Normal range of motion.      Cervical back: Normal range of motion and neck supple.   Skin:     General: Skin is warm and dry.      Capillary Refill: Capillary refill takes less than 2 seconds.      Coloration: Skin is not jaundiced.      Findings: No bruising or erythema.      Comments: Right gluteal 4 cm area of fluctuance with central necrosis and surrounding induration and erythema.    Neurological:      General: No focal deficit present.      Mental Status: She is alert and oriented to person, place, and time.   Psychiatric:         Mood and Affect: Mood normal.         Behavior: Behavior normal.           Labs      Results from last 7 days   Lab Units 08/22/22  1044 08/20/22  2348   WBC K/uL 7.14 9.56   HEMOGLOBIN g/dL 12.2 13.4   HEMATOCRIT % 36.5 39.1   PLATELETS K/uL 206 219     Results from last 7 days   Lab Units 08/20/22  2348   SODIUM mEQ/L 136   POTASSIUM mEQ/L 4.0   CHLORIDE mEQ/L 102   CO2 mEQ/L 27   BUN mg/dL 10   CREATININE mg/dL 0.7   CALCIUM mg/dL 9.2   ALBUMIN g/dL 3.8   BILIRUBIN TOTAL mg/dL 0.9   ALK PHOS IU/L 71   ALT IU/L 23   AST IU/L 23   GLUCOSE mg/dL 114*       No components found for: LACTICACID    Lab Results   Component Value Date    WBC 7.14 08/22/2022    HGB 12.2 08/22/2022    HCT 36.5 08/22/2022    MCV 93.8 08/22/2022     08/22/2022       Lab Results   Component Value Date    GLUCOSE 114 (H) 08/20/2022    CALCIUM 9.2 08/20/2022     08/20/2022    K 4.0 08/20/2022    CO2 27 08/20/2022     08/20/2022    BUN 10 08/20/2022    CREATININE 0.7 08/20/2022       Lab Results   Component Value Date    ALT 23 08/20/2022    AST 23 08/20/2022    ALKPHOS 71 08/20/2022    BILITOT 0.9 08/20/2022         Imaging  CT PELVIS WITHOUT IV CONTRAST    Result Date: 8/21/2022  CLINICAL HISTORY:  Clinical concern for cellulitis, perirectal abscess     IMPRESSION: There is induration in the medial right buttock with a small soft tissue nodule,  possibly an evolving phlegmon or abscess which measures up to 2 cm in diameter. No soft tissue gas.  No discernible association with the anorectal region which can be confirmed with perianal fistula protocol MR imaging, as indicated. Preliminary results were provided by Dr. Wakefield at 2:44 AM on 8/21/2022. Comparison study:  Abdominopelvic CT July 2015 COMMENT:  Axial CT images of the pelvis are completed without oral or intravenous contrast. Images are reconstructed in sagittal and coronal planes. CT DOSE:  One or more dose reduction techniques (e.g. automated exposure control, adjustment of the mA and/or kV according to patient size, use of iterative reconstruction technique) utilized for this examination. There is marked induration in the medial right buttock extending to the gluteal crease.  There is no approximately 2 cm area of more focal, somewhat rounded soft tissue attenuation could represent an evolving phlegmon/abscess.  There is no discernible connection to the anorectal region to support perianal fistula although this is best evaluated with perianal fistula protocol MRI. No soft tissue gas or visible opening to the skin Moderately distended urinary bladder. Uterus surgically absent. No adnexal mass. Colonic diverticula noted. No adenopathy in the imaged volume. There is multilevel degenerative disc disease throughout the imaged lumbar spine, again noting advanced disc space narrowing at the lumbosacral junction, with stable findings since 2015.  There is a stable degenerative anterolisthesis of L4 on L5.     I have independently reviewed the patient's Imaging. Significant imaging findings include right guteal collection    Assessment     66 year old female with right gluteal abscess            Plan   IV abx  Will need Incision and drainage of abscess  I discussed with the patient procedure details, risks and benefits.   Dr. Garces will see patient for incision and drainage.       Ida Wilkes MD

## 2022-08-23 PROBLEM — R21 RASH: Status: ACTIVE | Noted: 2022-08-23

## 2022-08-23 LAB
ANION GAP SERPL CALC-SCNC: 6 MEQ/L (ref 3–15)
BUN SERPL-MCNC: 10 MG/DL (ref 8–20)
CALCIUM SERPL-MCNC: 9 MG/DL (ref 8.9–10.3)
CHLORIDE SERPL-SCNC: 106 MEQ/L (ref 98–109)
CO2 SERPL-SCNC: 26 MEQ/L (ref 22–32)
CREAT SERPL-MCNC: 0.6 MG/DL (ref 0.6–1.1)
ERYTHROCYTE [DISTWIDTH] IN BLOOD BY AUTOMATED COUNT: 11.9 % (ref 11.7–14.4)
GFR SERPL CREATININE-BSD FRML MDRD: >60 ML/MIN/1.73M*2
GLUCOSE SERPL-MCNC: 93 MG/DL (ref 70–99)
HCT VFR BLDCO AUTO: 39 % (ref 35–45)
HGB BLD-MCNC: 13 G/DL (ref 11.8–15.7)
MCH RBC QN AUTO: 31 PG (ref 28–33.2)
MCHC RBC AUTO-ENTMCNC: 33.3 G/DL (ref 32.2–35.5)
MCV RBC AUTO: 92.9 FL (ref 83–98)
PDW BLD AUTO: 9.2 FL (ref 9.4–12.3)
PLATELET # BLD AUTO: 258 K/UL (ref 150–369)
POTASSIUM SERPL-SCNC: 4 MEQ/L (ref 3.6–5.1)
RBC # BLD AUTO: 4.2 M/UL (ref 3.93–5.22)
SODIUM SERPL-SCNC: 138 MEQ/L (ref 136–144)
WBC # BLD AUTO: 6.06 K/UL (ref 3.8–10.5)

## 2022-08-23 PROCEDURE — 25000000 HC PHARMACY GENERAL: Performed by: HOSPITALIST

## 2022-08-23 PROCEDURE — 87070 CULTURE OTHR SPECIMN AEROBIC: CPT | Performed by: NURSE PRACTITIONER

## 2022-08-23 PROCEDURE — 63700000 HC SELF-ADMINISTRABLE DRUG: Performed by: PHYSICIAN ASSISTANT

## 2022-08-23 PROCEDURE — 25800000 HC PHARMACY IV SOLUTIONS: Performed by: HOSPITALIST

## 2022-08-23 PROCEDURE — 63700000 HC SELF-ADMINISTRABLE DRUG: Performed by: HOSPITALIST

## 2022-08-23 PROCEDURE — 80048 BASIC METABOLIC PNL TOTAL CA: CPT | Performed by: PHYSICIAN ASSISTANT

## 2022-08-23 PROCEDURE — 99024 POSTOP FOLLOW-UP VISIT: CPT | Performed by: SURGERY

## 2022-08-23 PROCEDURE — 99233 SBSQ HOSP IP/OBS HIGH 50: CPT | Performed by: HOSPITALIST

## 2022-08-23 PROCEDURE — 12000000 HC ROOM AND CARE MED/SURG

## 2022-08-23 PROCEDURE — 36415 COLL VENOUS BLD VENIPUNCTURE: CPT | Performed by: PHYSICIAN ASSISTANT

## 2022-08-23 PROCEDURE — 63600000 HC DRUGS/DETAIL CODE: Performed by: HOSPITALIST

## 2022-08-23 PROCEDURE — 85027 COMPLETE CBC AUTOMATED: CPT | Performed by: PHYSICIAN ASSISTANT

## 2022-08-23 RX ORDER — CLOTRIMAZOLE AND BETAMETHASONE DIPROPIONATE 10; .64 MG/G; MG/G
CREAM TOPICAL 2 TIMES DAILY
Status: DISCONTINUED | OUTPATIENT
Start: 2022-08-23 | End: 2022-08-26 | Stop reason: HOSPADM

## 2022-08-23 RX ADMIN — AZTREONAM 1 G: 1 INJECTION, POWDER, LYOPHILIZED, FOR SOLUTION INTRAMUSCULAR; INTRAVENOUS at 19:57

## 2022-08-23 RX ADMIN — NICOTINE 1 PATCH: 14 PATCH, EXTENDED RELEASE TRANSDERMAL at 08:37

## 2022-08-23 RX ADMIN — SENNOSIDES 1 TABLET: 8.6 TABLET, FILM COATED ORAL at 08:37

## 2022-08-23 RX ADMIN — AZTREONAM 1 G: 1 INJECTION, POWDER, LYOPHILIZED, FOR SOLUTION INTRAMUSCULAR; INTRAVENOUS at 03:37

## 2022-08-23 RX ADMIN — VANCOMYCIN HYDROCHLORIDE 1250 MG: 500 INJECTION, POWDER, LYOPHILIZED, FOR SOLUTION INTRAVENOUS at 11:27

## 2022-08-23 RX ADMIN — POLYETHYLENE GLYCOL 3350 17 G: 17 POWDER, FOR SOLUTION ORAL at 08:37

## 2022-08-23 RX ADMIN — VANCOMYCIN HYDROCHLORIDE 1250 MG: 500 INJECTION, POWDER, LYOPHILIZED, FOR SOLUTION INTRAVENOUS at 23:40

## 2022-08-23 RX ADMIN — CLOTRIMAZOLE AND BETAMETHASONE DIPROPIONATE: 10; .5 CREAM TOPICAL at 15:00

## 2022-08-23 RX ADMIN — OXYCODONE HYDROCHLORIDE 5 MG: 5 TABLET ORAL at 00:20

## 2022-08-23 RX ADMIN — SENNOSIDES 1 TABLET: 8.6 TABLET, FILM COATED ORAL at 19:57

## 2022-08-23 NOTE — PATIENT CARE CONFERENCE
Care Progression Rounds Note  Date: 8/23/2022  Time: 10:52 AM     Patient Name: Adeola Rees     Medical Record Number: [id number]   YOB: 1955  Sex: Female      Room/Bed: 4206W    Admitting Diagnosis: Cellulitis of buttock [L03.317]   Admit Date/Time: 8/20/2022 10:51 PM    Primary Diagnosis: Abscess of right buttock  Principal Problem: Abscess of right buttock    GMLOS: 3.2  Anticipated Discharge Date: 8/23/2022    AM-PAC:  Mobility Score:      Discharge Planning:  Current Living Arrangements: home  Anticipated Discharge Disposition: home without assistance or services, home with home health    Barriers to Discharge:  Medical issues not resolved, Consult pending    Comments:  IV abx, need ID reccs    Participants:  , nursing, social work/services

## 2022-08-23 NOTE — PROGRESS NOTES
"   Hospital Medicine Service -  Daily Progress Note       SUBJECTIVE   Interval History: Seen and examined this AM.  States right buttock abscess much improved after bedside I&D.  Pain improved from 10/10  to 3/10.  She did develop rash overnight on her back and under breast folds.  She attributes this to sweating.  Had similar rash in the past and treated by dermatologist with combination nystatin and steroid cream.  She does not think this is a drug allergy.  However she refused to take IV aztreonam today due to possible liver side effects.. States she has a \"bad liver\" and and requested LFTs, they were already checked on 8/20 and were normal.  Denies any fever, chills, abdominal pain, nausea, vomiting, diarrhea. Her Tmax was 99.    Chart reviewed. Care discussed with patient, nurse, attending, surgery.     OBJECTIVE      Vital signs in last 24 hours:  Temp:  [36.7 °C (98.1 °F)-37.2 °C (99 °F)] 36.7 °C (98.1 °F)  Heart Rate:  [] 86  Resp:  [17-20] 18  BP: (115-152)/(57-92) 115/61  No intake or output data in the 24 hours ending 08/23/22 1420    PHYSICAL EXAMINATION        General:  anxious appearing, pacing around room  Head:  normocephalic, atraumatic  Eyes:  conjunctiva clear, sclera anicteric  ENT:  mucosa moist  Neck:  supple, no thyroid enlargement  Heart:  regular rate, regular rhythm, no murmur  Lungs:  clear bilaterally, no wheezing rales rhonchi crackles  Abd:  soft, NT, ND, normal BS,  :  no rinaldi  Extremities:  no edema, pulses palpable  MSK:  no gross joint deformities or swelling  Skin:  Right buttock dressing C/D/I; multiple erythematous papules on back  Neuro:  AAO x3, grossly non-focal   Pysch:  anxious, cooperative    LINES, CATHETERS, DRAINS, AIRWAYS, AND WOUNDS   Lines, Drains, and Airways:  Wounds (agree with documentation and present on admission):  Peripheral IV (Adult) 08/22/22 Anterior;Right Forearm (Active)   Number of days: 1       Rash 08/22/22 1850 Left lower thoracic spine " (Active)   Number of days: 1       Rash 08/22/22 1850 Left upper thoracic spine (Active)   Number of days: 1       Comments:         LABS / IMAGING / TELE      Labs  Results from last 7 days   Lab Units 08/23/22  0537 08/22/22  1044 08/20/22  2348   SODIUM mEQ/L 138 136 136   POTASSIUM mEQ/L 4.0 3.9 4.0   CHLORIDE mEQ/L 106 103 102   CO2 mEQ/L 26 28 27   BUN mg/dL 10 10 10   CREATININE mg/dL 0.6 0.7 0.7   CALCIUM mg/dL 9.0 8.9 9.2   ALBUMIN g/dL  --   --  3.8   BILIRUBIN TOTAL mg/dL  --   --  0.9   ALK PHOS IU/L  --   --  71   ALT IU/L  --   --  23   AST IU/L  --   --  23   GLUCOSE mg/dL 93 93 114*     Results from last 7 days   Lab Units 08/23/22  0537 08/22/22  1044 08/20/22  2348   WBC K/uL 6.06 7.14 9.56   HEMOGLOBIN g/dL 13.0 12.2 13.4   HEMATOCRIT % 39.0 36.5 39.1   PLATELETS K/uL 258 206 219             SARS-CoV-2 (COVID-19) (no units)   Date/Time Value   08/22/2022 1502 Negative       Imaging  CT PELVIS WITHOUT IV CONTRAST    Result Date: 8/21/2022  IMPRESSION: There is induration in the medial right buttock with a small soft tissue nodule, possibly an evolving phlegmon or abscess which measures up to 2 cm in diameter. No soft tissue gas.  No discernible association with the anorectal region which can be confirmed with perianal fistula protocol MR imaging, as indicated. Preliminary results were provided by Dr. Wakefield at 2:44 AM on 8/21/2022. Comparison study:  Abdominopelvic CT July 2015 COMMENT:  Axial CT images of the pelvis are completed without oral or intravenous contrast. Images are reconstructed in sagittal and coronal planes. CT DOSE:  One or more dose reduction techniques (e.g. automated exposure control, adjustment of the mA and/or kV according to patient size, use of iterative reconstruction technique) utilized for this examination. There is marked induration in the medial right buttock extending to the gluteal crease.  There is no approximately 2 cm area of more focal, somewhat rounded soft  tissue attenuation could represent an evolving phlegmon/abscess.  There is no discernible connection to the anorectal region to support perianal fistula although this is best evaluated with perianal fistula protocol MRI. No soft tissue gas or visible opening to the skin Moderately distended urinary bladder. Uterus surgically absent. No adnexal mass. Colonic diverticula noted. No adenopathy in the imaged volume. There is multilevel degenerative disc disease throughout the imaged lumbar spine, again noting advanced disc space narrowing at the lumbosacral junction, with stable findings since 2015.  There is a stable degenerative anterolisthesis of L4 on L5.      ECG/Telemetry  NA    ASSESSMENT AND PLAN      * Abscess of right buttock  Assessment & Plan  Presented with right buttock pain, redness, swelling.  No fever or leukocytosis.  CT pelvis with induration medial right buttock with small tissue nodule,  Possibly evolving phlegmon or abscess measuring up to 2 cm in diameter.    S/p bedside I&D by general surgery 8/22 and wound packing placed  Packing removed 8/23 and can start sitz baths/showers  Needs outpatient follow-up with surgery Dr Garces in 6 weeks    Continue IV aztreonam and vancomycin per ID  However patient refusing aztreonam because she is concerned about liver side effects  Her LFTs are normal from 8/20  Follow-up blood cultures, negative to date  Continue lowest effective pain control  Await transition to PO antibiotics by ID      Rash  Assessment & Plan  Complains of rash on back and under breast folds which she attributes to sweating  She does not think this is a drug allergy  She has history of similar rash and has been treated by dermatologist  Requests combination nystatin and steroid cream like at home  Benadryl prn  Monitor for improvement    Exposure to COVID-19 virus  Assessment & Plan  Adeola Rees may have been exposed to COVID 19 while in the hospital.      Adeola Rees is  currently hospitalized.      The patient  was notified of the potential exposure by Alana Ayoub PA-C.  The patient currently does not have symptoms.      The patient  express(es) understanding of the potential exposure and all questions were answered.      The patient  was educated on the signs and symptoms of COVID 19.  The patient was recommended to quarantine for 10 days.  They were also educated on when to call their PCP or potentially call 911 if an emergency.      We offered COVID testing after the potential exposure.  The patient  does want follow up testing and we will help facilitate testing for the patient.      Jose May MD    Covid-19 PCR negative on 8/20 and 8/22  Will check again on day 5 which is 8/25    Smoker  Assessment & Plan  Continue nicotine patch  Smoking cessation    Constipation  Assessment & Plan  Continue bowel regimen         VTE Assessment: Padua VTE Score: 1  VTE Prophylaxis:  Current anticoagulants:    None      Code Status: Full Code  Palliative Care Screening Score: 0   Estimated Discharge Date: 8/24/2022     Disposition Planning: pending ID and transition to PO abx    *Recommendations are not final until co-signed by attending*     DONTA Hutchins  8/23/2022

## 2022-08-23 NOTE — PLAN OF CARE
Plan of Care Review  Plan of Care Reviewed With: patient  Progress: no change  Outcome Summary: Pt Alert and orientedx3.  PRN Oxycodone for pain control. walk up to bathroom.  IV antibiotics as ordered. bed alarm on and safety precuasion in place.

## 2022-08-23 NOTE — ASSESSMENT & PLAN NOTE
Complains of rash on back and under breast folds which she attributes to sweating  She does not think this is a drug allergy  She has history of similar rash and has been treated by dermatologist  Requests combination nystatin and steroid cream like at home  Benadryl prn  Monitor for improvement

## 2022-08-23 NOTE — PROGRESS NOTES
66-year-old female with right buttock abscess status post I&D on 8/22/2022  Pain much less  No new complaints  Vitals:    08/22/22 1902 08/22/22 2000 08/22/22 2300 08/23/22 0836   BP:  (!) 147/92 133/60 115/61   BP Location:  Right upper arm Right upper arm Right upper arm   Patient Position:  Sitting Sitting Lying   Pulse: (!) 102 (!) 103 86    Resp: (P) 17 18 18 18   Temp:  37.2 °C (99 °F) 37 °C (98.6 °F) 36.7 °C (98.1 °F)   TempSrc:  Oral Oral Oral   SpO2: 98% 100% 97% 99%   Weight:       Height:         No intake/output data recorded.  Awake, alert, and oriented ×3  Packing removed.  Surrounding induration but no fluctuance    Results from last 7 days   Lab Units 08/23/22  0537 08/22/22  1044 08/20/22  2348   WBC K/uL 6.06 7.14 9.56   HEMOGLOBIN g/dL 13.0 12.2 13.4   HEMATOCRIT % 39.0 36.5 39.1   PLATELETS K/uL 258 206 219     Results from last 7 days   Lab Units 08/23/22  0537 08/22/22  1044 08/20/22  2348   SODIUM mEQ/L 138 136 136   POTASSIUM mEQ/L 4.0 3.9 4.0   CHLORIDE mEQ/L 106 103 102   CO2 mEQ/L 26 28 27   BUN mg/dL 10 10 10   CREATININE mg/dL 0.6 0.7 0.7   CALCIUM mg/dL 9.0 8.9 9.2   ALBUMIN g/dL  --   --  3.8   BILIRUBIN TOTAL mg/dL  --   --  0.9   ALK PHOS IU/L  --   --  71   ALT IU/L  --   --  23   AST IU/L  --   --  23   GLUCOSE mg/dL 93 93 114*         Assessment/plan: Right buttock abscess  Sitz bath's/showers  No objection to discharge on oral antibiotic  Would have her follow-up in our office in 6 weeks time  Patient aware the possibility that this may be a perirectal abscess

## 2022-08-23 NOTE — PLAN OF CARE
Plan of Care Review  Plan of Care Reviewed With: patient  Progress: improving  Outcome Summary: Pt. is AAOX4, frequent reassurance given to pt. Pt. remains anxious and c/o ongoing rectal pain . Pt. has prn Morphine IV given for pain, I&D completed at bedside, packing and dresing intact. Pt. c/o intermittent nausea, started on bowel regime. Pt. believes she may be allergic to iv abx, MD notified, PRN benadryl given, rash noted on back, documented in flowsheet. Pt. is ambulatory in room with stand by assistant. Pt. isolated this afternoon after being expose to covid 19, remains asymtompatic, afebrile, pt. Iv removed, infiltrated , IV abx to be resume. Safety precaution maintained, call bell within reach.

## 2022-08-23 NOTE — PLAN OF CARE
Problem: Adult Inpatient Plan of Care  Goal: Plan of Care Review  Outcome: Progressing  Flowsheets (Taken 8/23/2022 8236)  Progress: improving  Plan of Care Reviewed With: patient  Outcome Summary: Pt aaox3, mild discomfort in abcess area, declining pain medications at this time. IV abx as ordered. Packing removed from wound, pt to do sitz bath qshift. Pt remains on covid quarentine at this time. Call bell in reach, safety precautions maintained.   Plan of Care Review  Plan of Care Reviewed With: patient  Progress: improving  Outcome Summary: Pt aaox3, mild discomfort in abcess area, declining pain medications at this time. IV abx as ordered. Packing removed from wound, pt to do sitz bath qshift. Pt remains on covid quarentine at this time. Call bell in reach, safety precautions maintained.

## 2022-08-23 NOTE — PROGRESS NOTES
"Infectious Disease Progress Note    Patient Name: Adeola Rees  MR#: [id number]  : 1955  Admission Date: 2022  Date: 22   Time: 2:22 PM   Author: RAI Hinojosa    Major Events:     Antibiotics:    Anti-infectives (From admission, onward)    Start     Dose/Rate Route Frequency Ordered Stop    22 1145  clotrimazole-betamethasone (LOTRISONE) 1-0.05 % cream          Topical 2 times daily 22 1046      22 1130  vancomycin 1.25 gram/250 mL IVPB in NSS        \"And\" Linked Group Details    1,250 mg  166.7 mL/hr over 90 Minutes intravenous Every 12 hours interval 22 0404      22 0345  aztreonam (AZACTAM) IVPB 1 g in 100 mL NSS vial in bag         1 g  200 mL/hr over 30 Minutes intravenous Every 8 hours interval 22 0334            Subjective   Patient reports the buttocks is still painful but decreasing and able to sit today without too much pain, has been standing in the warm shower but reports she has not seen much drainage come out, tolerating diet, d/w nursing     Review of Systems    Pertinent items are noted in HPI.    Objective     Vital Signs:    Patient Vitals for the past 72 hrs:   BP Temp Temp src Pulse Resp SpO2 Height Weight   22 0836 115/61 36.7 °C (98.1 °F) Oral -- 18 99 % -- --   22 2300 133/60 37 °C (98.6 °F) Oral 86 18 97 % -- --   22 2000 (!) 147/92 37.2 °C (99 °F) Oral (!) 103 18 100 % -- --   22 190 -- -- -- (!) 102 (P) 17 98 % -- --   22 1859 (!) 152/77 36.8 °C (98.3 °F) Oral (!) 107 20 99 % -- --   22 1839 -- -- -- (!) 103 -- -- -- --   22 1501 (!) 130/57 36.7 °C (98.1 °F) Oral 95 18 100 % -- --   22 0900 -- -- -- 86 -- 99 % -- --   22 0804 105/63 37.3 °C (99.1 °F) Oral 86 18 99 % -- --   22 0000 (!) 125/56 37.1 °C (98.8 °F) Oral 84 18 96 % -- --   22 1900 135/78 37.8 °C (100.1 °F) Oral 100 18 100 % -- --   22 1637 116/66 36.5 °C (97.7 °F) Oral 77 18 98 % -- -- " "  22 0809 106/60 36.7 °C (98.1 °F) Oral 74 18 99 % -- --   22 0540 130/80 36.8 °C (98.3 °F) Oral 79 18 95 % 1.651 m (5' 5\") 74.4 kg (164 lb)   22 0528 (!) 121/58 -- -- 83 19 98 % 1.651 m (5' 5\") 74.4 kg (164 lb)   22 0444 -- -- -- 82 18 98 % -- --   22 0335 (!) 139/59 -- -- 84 18 97 % -- --   22 2343 (!) 144/72 -- -- 93 15 97 % -- --   22 2042 (!) 143/88 37.4 °C (99.4 °F) -- (!) 110 18 98 % 1.676 m (5' 6\") 74.8 kg (165 lb)       Temp (72hrs), Av.1 °C (98.7 °F), Min:36.5 °C (97.7 °F), Max:37.8 °C (100.1 °F)      Physical Exam:    Visit Vitals  /61 (BP Location: Right upper arm, Patient Position: Lying)   Pulse 86   Temp 36.7 °C (98.1 °F) (Oral)   Resp 18   Ht 1.651 m (5' 5\")   Wt 74.4 kg (164 lb)   LMP  (LMP Unknown)   SpO2 99%   Breastfeeding No   BMI 27.29 kg/m²     General appearance: alert, appears stated age, cooperative and no distress  Eyes: anicteric  Lungs: nonlabored, no cough observed  Skin: erythema at the right gluteal area with induration scott-rectal area with laceration, no active drainage, + tenderness  Neurologic: Mental status: Alert, oriented, thought content appropriate    Lines, Drains, Airways, Wounds:  Peripheral IV (Adult) 22 Anterior;Right Forearm (Active)   Number of days: 1       Rash 220 Left lower thoracic spine (Active)   Number of days: 1       Rash 22 Left upper thoracic spine (Active)   Number of days: 1       Labs:    CBC Results       22     0537 1044 2348    WBC 6.06 7.14 9.56    RBC 4.20 3.89 4.29    HGB 13.0 12.2 13.4    HCT 39.0 36.5 39.1    MCV 92.9 93.8 91.1    MCH 31.0 31.4 31.2    MCHC 33.3 33.4 34.3     206 219      BMP Results       22     0537 1044 2348     136 136    K 4.0 3.9 4.0    Cl 106 103 102    CO2 26 28 27    Glucose 93 93 114    BUN 10 10 10    Creatinine 0.6 0.7 0.7    Calcium 9.0 8.9 9.2    Anion Gap 6 5 7    EGFR >60.0 " >60.0 >60.0         Comment for K at 2348 on 08/20/22: Results obtained on plasma. Plasma Potassium values may be up to 0.4 mEQ/L less than serum values. The differences may be greater for patients with high platelet or white cell counts.      PT/PTT Results    No lab values to display.     UA Results       12/19/21     0338    Color Yellow    Clarity Clear    Glucose Negative    Bilirubin Negative    Ketones Negative    Sp Grav <=1.005    Blood Negative    Ph 7.0    Protein Negative    Urobilinogen 0.2    Nitrite Negative    Leuk Est +1    WBC 0 TO 3    RBC 0 TO 4    Bacteria None Seen         Comment for Blood at 0338 on 12/19/21: The sensitivity of the occult blood test is equivalent to approximately 4 intact RBC/HPF.      Lactate Results    No lab values to display.         Microbiology Results     Procedure Component Value Units Date/Time    SARS-CoV-2 (COVID-19), PCR Nasopharynx [[id number]]  (Normal) Collected: 08/22/22 1502    Specimen: Nasopharyngeal Swab from Nasopharynx Updated: 08/22/22 1547    Narrative:      The following orders were created for panel order SARS-CoV-2 (COVID-19), PCR Nasopharynx.  Procedure                               Abnormality         Status                     ---------                               -----------         ------                     SARS-CoV-2 (COVID-19), P...[[id number]]  Normal              Final result                 Please view results for these tests on the individual orders.    SARS-CoV-2 (COVID-19), PCR Nasopharynx [[id number]]  (Normal) Collected: 08/22/22 1502    Specimen: Nasopharyngeal Swab from Nasopharynx Updated: 08/22/22 1547     SARS-CoV-2 (COVID-19) Negative    Narrative:      Testing performed using real-time PCR for detection of COVID-19. EUA approved validation studies performed on site.     SARS-CoV-2 (COVID-19), PCR Nasopharynx [[id number]]  (Normal) Collected: 08/20/22 2351    Specimen: Nasopharyngeal Swab from Nasopharynx Updated: 08/21/22 0028     Narrative:      The following orders were created for panel order SARS-CoV-2 (COVID-19), PCR Nasopharynx.  Procedure                               Abnormality         Status                     ---------                               -----------         ------                     SARS-CoV-2 (COVID-19), P...[[id number]]  Normal              Final result                 Please view results for these tests on the individual orders.    SARS-CoV-2 (COVID-19), PCR Nasopharynx [[id number]]  (Normal) Collected: 08/20/22 2351    Specimen: Nasopharyngeal Swab from Nasopharynx Updated: 08/21/22 0028     SARS-CoV-2 (COVID-19) Negative    Narrative:      Testing performed using real-time PCR for detection of COVID-19. EUA approved validation studies performed on site.     Blood Culture Blood, Venous [[id number]]  (Normal) Collected: 08/20/22 2348    Specimen: Blood, Venous Updated: 08/23/22 0401     Culture No growth at 48 hours    Blood Culture Blood, Venous [[id number]]  (Normal) Collected: 08/20/22 2348    Specimen: Blood, Venous Updated: 08/23/22 0401     Culture No growth at 48 hours          Pathology Results     ** No results found for the last 720 hours. **          Echo:         Imaging:    Radiology Imaging    XR ABDOMEN 1 VW    Narrative  CLINICAL HISTORY: Abdominal bloating.    --    Impression  Fecal matter in the colon.    COMMENT: AP radiographs of the abdomen and pelvis reveal air and fecal matter  within the colon.  There is no definite evidence of small bowel obstruction or  free intraperitoneal air.  Comparison is made to a CT scan dated 7/20/2015.      Assessment     Right gluteal cellulitis and abscess   - surgery following, s/p bedside I&D on 8/22, with wound packing, no cultures obtained at that time  - packing removed today and culture obtained by myself  - afebrile  - no leukocytosis  - CT reviewed  - blood c/s sterile so far     COVID 19 Exposure- on isolation, SARS CoV2 PCR negative on 8/20 and 8/22  -  no hypoxia or cough     Allergy to penicillin caused hives, Ceftin caused local arm pain, Sulfonamides caused rash, ciprofloxacin caused joint pain          Plan     Local site culture obtained and ordered, continue Aztreonam and vanco pending further c/s analysis

## 2022-08-24 LAB
ALBUMIN SERPL-MCNC: 3.4 G/DL (ref 3.4–5)
ALP SERPL-CCNC: 57 IU/L (ref 35–126)
ALT SERPL-CCNC: 23 IU/L (ref 11–54)
ANION GAP SERPL CALC-SCNC: 6 MEQ/L (ref 3–15)
AST SERPL-CCNC: 18 IU/L (ref 15–41)
BILIRUB SERPL-MCNC: 0.6 MG/DL (ref 0.3–1.2)
BUN SERPL-MCNC: 10 MG/DL (ref 8–20)
CALCIUM SERPL-MCNC: 9 MG/DL (ref 8.9–10.3)
CHLORIDE SERPL-SCNC: 105 MEQ/L (ref 98–109)
CO2 SERPL-SCNC: 28 MEQ/L (ref 22–32)
CREAT SERPL-MCNC: 0.5 MG/DL (ref 0.6–1.1)
DATE+TIME DOSE: 2340
DATE+TIME DOSE: NORMAL
ERYTHROCYTE [DISTWIDTH] IN BLOOD BY AUTOMATED COUNT: 11.9 % (ref 11.7–14.4)
GFR SERPL CREATININE-BSD FRML MDRD: >60 ML/MIN/1.73M*2
GLUCOSE SERPL-MCNC: 89 MG/DL (ref 70–99)
HCT VFR BLDCO AUTO: 38.4 % (ref 35–45)
HGB BLD-MCNC: 12.6 G/DL (ref 11.8–15.7)
MCH RBC QN AUTO: 30.3 PG (ref 28–33.2)
MCHC RBC AUTO-ENTMCNC: 32.8 G/DL (ref 32.2–35.5)
MCV RBC AUTO: 92.3 FL (ref 83–98)
PDW BLD AUTO: 9.3 FL (ref 9.4–12.3)
PLATELET # BLD AUTO: 272 K/UL (ref 150–369)
POTASSIUM SERPL-SCNC: 3.8 MEQ/L (ref 3.6–5.1)
PROT SERPL-MCNC: 6.2 G/DL (ref 6–8.2)
RBC # BLD AUTO: 4.16 M/UL (ref 3.93–5.22)
SODIUM SERPL-SCNC: 139 MEQ/L (ref 136–144)
VANCOMYCIN TROUGH SERPL-MCNC: 14.9 UG/ML (ref 10–15)
WBC # BLD AUTO: 4.68 K/UL (ref 3.8–10.5)

## 2022-08-24 PROCEDURE — 99024 POSTOP FOLLOW-UP VISIT: CPT | Performed by: SURGERY

## 2022-08-24 PROCEDURE — 80053 COMPREHEN METABOLIC PANEL: CPT | Performed by: PHYSICIAN ASSISTANT

## 2022-08-24 PROCEDURE — 36415 COLL VENOUS BLD VENIPUNCTURE: CPT | Performed by: PHYSICIAN ASSISTANT

## 2022-08-24 PROCEDURE — 63600000 HC DRUGS/DETAIL CODE: Performed by: HOSPITALIST

## 2022-08-24 PROCEDURE — 25800000 HC PHARMACY IV SOLUTIONS: Performed by: HOSPITALIST

## 2022-08-24 PROCEDURE — 25000000 HC PHARMACY GENERAL: Performed by: HOSPITALIST

## 2022-08-24 PROCEDURE — 80202 ASSAY OF VANCOMYCIN: CPT | Performed by: HOSPITALIST

## 2022-08-24 PROCEDURE — 12000000 HC ROOM AND CARE MED/SURG

## 2022-08-24 PROCEDURE — 99233 SBSQ HOSP IP/OBS HIGH 50: CPT | Mod: CR | Performed by: HOSPITALIST

## 2022-08-24 PROCEDURE — 63700000 HC SELF-ADMINISTRABLE DRUG: Performed by: HOSPITALIST

## 2022-08-24 PROCEDURE — 85027 COMPLETE CBC AUTOMATED: CPT | Performed by: PHYSICIAN ASSISTANT

## 2022-08-24 RX ADMIN — AZTREONAM 1 G: 1 INJECTION, POWDER, LYOPHILIZED, FOR SOLUTION INTRAMUSCULAR; INTRAVENOUS at 21:11

## 2022-08-24 RX ADMIN — CLOTRIMAZOLE AND BETAMETHASONE DIPROPIONATE: 10; .5 CREAM TOPICAL at 21:19

## 2022-08-24 RX ADMIN — CLOTRIMAZOLE AND BETAMETHASONE DIPROPIONATE: 10; .5 CREAM TOPICAL at 08:48

## 2022-08-24 RX ADMIN — AZTREONAM 1 G: 1 INJECTION, POWDER, LYOPHILIZED, FOR SOLUTION INTRAMUSCULAR; INTRAVENOUS at 03:14

## 2022-08-24 RX ADMIN — SENNOSIDES 1 TABLET: 8.6 TABLET, FILM COATED ORAL at 21:10

## 2022-08-24 RX ADMIN — NICOTINE 1 PATCH: 14 PATCH, EXTENDED RELEASE TRANSDERMAL at 08:48

## 2022-08-24 RX ADMIN — AZTREONAM 1 G: 1 INJECTION, POWDER, LYOPHILIZED, FOR SOLUTION INTRAMUSCULAR; INTRAVENOUS at 11:38

## 2022-08-24 RX ADMIN — VANCOMYCIN HYDROCHLORIDE 1250 MG: 500 INJECTION, POWDER, LYOPHILIZED, FOR SOLUTION INTRAVENOUS at 12:21

## 2022-08-24 NOTE — PATIENT CARE CONFERENCE
Care Progression Rounds Note  Date: 8/24/2022  Time: 10:45 AM     Patient Name: Adeola Rees     Medical Record Number: [id number]   YOB: 1955  Sex: Female      Room/Bed: 4206W    Admitting Diagnosis: Cellulitis of buttock [L03.317]   Admit Date/Time: 8/20/2022 10:51 PM    Primary Diagnosis: Abscess of right buttock  Principal Problem: Abscess of right buttock    GMLOS: 3.2  Anticipated Discharge Date: 8/24/2022    AM-PAC:  Mobility Score:      Discharge Planning:  Current Living Arrangements: home  Anticipated Discharge Disposition: home without assistance or services    Barriers to Discharge:  Medical issues not resolved    Comments:  IV abx, cultures pending    Participants:  , nursing, social work/services

## 2022-08-24 NOTE — PLAN OF CARE
Plan of Care Review  Plan of Care Reviewed With: patient  Progress: no change  Outcome Summary: Paientt Alert and orientedx3. Ambulating up to bathroom. Mild pain and dicomfort. Refused prn pain medication. Pt decline to do sitz bath during this shift.  IV antibiotics as ordered. Bed alarm on and safety precuasion in place.

## 2022-08-24 NOTE — PROGRESS NOTES
Hospital Medicine Service -  Daily Progress Note       SUBJECTIVE   Paitient anxious.  Less pain in right buttock  Rash on back improving  Nervous about oral sunblocks because she did some research and there is a small chance it can cause C. difficile and will interact with certain foods that she eats    D/w ID    ROS negative other than noted above.       OBJECTIVE      Vital signs in last 24 hours:  Temp:  [36.5 °C (97.7 °F)-36.8 °C (98.2 °F)] 36.5 °C (97.7 °F)  Heart Rate:  [80-85] 80  Resp:  [18] 18  BP: (104-146)/(65-80) 132/80  No intake or output data in the 24 hours ending 08/24/22 1421    PHYSICAL EXAMINATION      Physical Exam   Gen:  Well developed and nourished, in NAD  HEENT:NCAT, EOMI, Hearing Intact, Nasal Passages Clear, Moist Oral Mucosa  Neck:  FROM, Supple  Lungs: CTA B,  No RRW, no accessory muscle use, good air exchange  CVS: S1 S2 RRR, no mrg  Abd:  soft, NT no rebound/guarding, BS +  Msk: FROM in all joints  Neuro: A&O x 3, no focal deficits, no sensory deficits  Psyhe: Pleasant and cooperative, normal affect  Skin: Rash on back improved, scattered papules; right  Buttock wound not examined as she was on the toilet  Ext: No edema x 2   LINES, CATHETERS, DRAINS, AIRWAYS, AND WOUNDS   Lines, Drains, Airways, Wounds:  Peripheral IV (Adult) 08/23/22 Left;Posterior Hand (Active)   Number of days: 1       Rash 08/22/22 1850 Left lower thoracic spine (Active)   Number of days: 2       Rash 08/22/22 1850 Left upper thoracic spine (Active)   Number of days: 2       Comments:      LABS / IMAGING / TELE      I have reviewed all labs and imaging results. Please see Problem List/A/P for relevant findings.      ASSESSMENT AND PLAN      * Abscess of right buttock  Assessment & Plan  Presented with right buttock pain, redness, swelling.  No fever or leukocytosis.  CT pelvis with induration medial right buttock with small tissue nodule,  Possibly evolving phlegmon or abscess measuring up to 2 cm in  diameter.    S/p bedside I&D by general surgery 8/22 and wound packing placed  Packing removed 8/23 and can start sitz baths/showers  Needs outpatient follow-up with surgery Dr Garces in 6 weeks    Continue IV aztreonam and vancomycin per ID  Her LFTs are normal from 8/20  Follow-up blood cultures, negative to date  Continue lowest effective pain control  Await transition to PO antibiotics by ID      Rash  Assessment & Plan  Complains of rash on back and under breast folds which she attributes to sweating  She does not think this is a drug allergy  She has history of similar rash and has been treated by dermatologist  Requests combination nystatin and steroid cream like at home  Benadryl prn  Monitor for improvement    Exposure to COVID-19 virus  Assessment & Plan  Adeola Rees may have been exposed to COVID 19 while in the hospital.      Adeola Rees is currently hospitalized.      The patient  was notified of the potential exposure by Alana Ayoub PA-C.  The patient currently does not have symptoms.      The patient  express(es) understanding of the potential exposure and all questions were answered.      The patient  was educated on the signs and symptoms of COVID 19.  The patient was recommended to quarantine for 10 days.  They were also educated on when to call their PCP or potentially call 911 if an emergency.      We offered COVID testing after the potential exposure.  The patient  does want follow up testing and we will help facilitate testing for the patient.      Jose May MD    Covid-19 PCR negative on 8/20 and 8/22  Will check again on day 5 which is 8/25    Smoker  Assessment & Plan  Continue nicotine patch  Smoking cessation    Constipation  Assessment & Plan  Continue bowel regimen       VTE Assessment: Padua VTE Score: 1  VTE Prophylaxis Plan: ambulating in room  Code Status: Full Code  Estimated Discharge Date: 8/25/2022  Disposition Planning: home pending transition to PO abx      Jose May MD  8/24/2022

## 2022-08-24 NOTE — PROGRESS NOTES
"Infectious Disease Progress Note    Patient Name: Adeola Rees  MR#: [id number]  : 1955  Admission Date: 2022  Date: 22   Time: 12:34 PM   Author: RAI Hinojosa    Major Events:     Antibiotics:    Anti-infectives (From admission, onward)    Start     Dose/Rate Route Frequency Ordered Stop    22 1145  clotrimazole-betamethasone (LOTRISONE) 1-0.05 % cream          Topical 2 times daily 22 1046      22 1130  vancomycin 1.25 gram/250 mL IVPB in NSS        \"And\" Linked Group Details    1,250 mg  166.7 mL/hr over 90 Minutes intravenous Every 12 hours interval 22 0404      22 0345  aztreonam (AZACTAM) IVPB 1 g in 100 mL NSS vial in bag         1 g  200 mL/hr over 30 Minutes intravenous Every 8 hours interval 22 0334            Subjective   Patient states she is not having as much soreness in the buttocks and has been standing in the hot shower to allow it to drain, admits to bloody output, d/w nursing     Review of Systems    Pertinent items are noted in HPI.    Objective     Vital Signs:    Patient Vitals for the past 72 hrs:   BP Temp Temp src Pulse Resp SpO2   22 0855 132/80 36.5 °C (97.7 °F) Oral -- 18 99 %   22 2300 (!) 146/72 36.8 °C (98.2 °F) Oral 80 18 99 %   22 1900 (!) 142/77 36.6 °C (97.9 °F) Oral 85 18 98 %   22 1500 104/65 36.8 °C (98.2 °F) Oral -- 18 97 %   22 0836 115/61 36.7 °C (98.1 °F) Oral -- 18 99 %   22 2300 133/60 37 °C (98.6 °F) Oral 86 18 97 %   22 2000 (!) 147/92 37.2 °C (99 °F) Oral (!) 103 18 100 %   22 190 -- -- -- (!) 102 (P) 17 98 %   22 1859 (!) 152/77 36.8 °C (98.3 °F) Oral (!) 107 20 99 %   22 1839 -- -- -- (!) 103 -- --   22 1501 (!) 130/57 36.7 °C (98.1 °F) Oral 95 18 100 %   22 0900 -- -- -- 86 -- 99 %   22 0804 105/63 37.3 °C (99.1 °F) Oral 86 18 99 %   22 0000 (!) 125/56 37.1 °C (98.8 °F) Oral 84 18 96 %   22 1900 135/78 " "37.8 °C (100.1 °F) Oral 100 18 100 %   22 1637 116/66 36.5 °C (97.7 °F) Oral 77 18 98 %       Temp (72hrs), Av.9 °C (98.4 °F), Min:36.5 °C (97.7 °F), Max:37.8 °C (100.1 °F)      Physical Exam:    Visit Vitals  /80 (BP Location: Right upper arm, Patient Position: Standing)   Pulse 80   Temp 36.5 °C (97.7 °F) (Oral)   Resp 18   Ht 1.651 m (5' 5\")   Wt 74.4 kg (164 lb)   LMP  (LMP Unknown)   SpO2 99%   Breastfeeding No   BMI 27.29 kg/m²     General appearance: alert, appears stated age, cooperative and no distress  Eyes: anicteric  Lungs: nonlabored, no cough observed  Skin: mild erythema of the right gluteal area, minimal induration at the perirectal area, no drainage from incision, minimal tenderness  Neurologic: Mental status: Alert, oriented, thought content appropriate    Lines, Drains, Airways, Wounds:  Peripheral IV (Adult) 22 Left;Posterior Hand (Active)   Number of days: 1       Rash 22 1850 Left lower thoracic spine (Active)   Number of days: 2       Rash 22 1850 Left upper thoracic spine (Active)   Number of days: 2       Labs:    CBC Results       22     0409 0537 1044    WBC 4.68 6.06 7.14    RBC 4.16 4.20 3.89    HGB 12.6 13.0 12.2    HCT 38.4 39.0 36.5    MCV 92.3 92.9 93.8    MCH 30.3 31.0 31.4    MCHC 32.8 33.3 33.4     258 206      BMP Results       22     0409 0537 1044     138 136    K 3.8 4.0 3.9    Cl 105 106 103    CO2 28 26 28    Glucose 89 93 93    BUN 10 10 10    Creatinine 0.5 0.6 0.7    Calcium 9.0 9.0 8.9    Anion Gap 6 6 5    EGFR >60.0 >60.0 >60.0      PT/PTT Results    No lab values to display.     UA Results       21     0338    Color Yellow    Clarity Clear    Glucose Negative    Bilirubin Negative    Ketones Negative    Sp Grav <=1.005    Blood Negative    Ph 7.0    Protein Negative    Urobilinogen 0.2    Nitrite Negative    Leuk Est +1    WBC 0 TO 3    RBC 0 TO 4    Bacteria None Seen "         Comment for Blood at 0338 on 12/19/21: The sensitivity of the occult blood test is equivalent to approximately 4 intact RBC/HPF.      Lactate Results    No lab values to display.         Microbiology Results     Procedure Component Value Units Date/Time    Anaerobic Culture / Smear (includes Aerobic Culture) Buttock, Right [[id number]] Collected: 08/23/22 1447    Specimen: Wound Swab from Buttock, Right Updated: 08/24/22 1037     Culture Young Growth     Gram Stain Result No WBC Seen      No organisms seen    SARS-CoV-2 (COVID-19), PCR Nasopharynx [[id number]]  (Normal) Collected: 08/22/22 1502    Specimen: Nasopharyngeal Swab from Nasopharynx Updated: 08/22/22 1547    Narrative:      The following orders were created for panel order SARS-CoV-2 (COVID-19), PCR Nasopharynx.  Procedure                               Abnormality         Status                     ---------                               -----------         ------                     SARS-CoV-2 (COVID-19), P...[[id number]]  Normal              Final result                 Please view results for these tests on the individual orders.    SARS-CoV-2 (COVID-19), PCR Nasopharynx [[id number]]  (Normal) Collected: 08/22/22 1502    Specimen: Nasopharyngeal Swab from Nasopharynx Updated: 08/22/22 1547     SARS-CoV-2 (COVID-19) Negative    Narrative:      Testing performed using real-time PCR for detection of COVID-19. EUA approved validation studies performed on site.     SARS-CoV-2 (COVID-19), PCR Nasopharynx [[id number]]  (Normal) Collected: 08/20/22 2351    Specimen: Nasopharyngeal Swab from Nasopharynx Updated: 08/21/22 0028    Narrative:      The following orders were created for panel order SARS-CoV-2 (COVID-19), PCR Nasopharynx.  Procedure                               Abnormality         Status                     ---------                               -----------         ------                     SARS-CoV-2 (COVID-19), P...[[id number]]  Normal               Final result                 Please view results for these tests on the individual orders.    SARS-CoV-2 (COVID-19), PCR Nasopharynx [[id number]]  (Normal) Collected: 08/20/22 2351    Specimen: Nasopharyngeal Swab from Nasopharynx Updated: 08/21/22 0028     SARS-CoV-2 (COVID-19) Negative    Narrative:      Testing performed using real-time PCR for detection of COVID-19. EUA approved validation studies performed on site.     Blood Culture Blood, Venous [[id number]]  (Normal) Collected: 08/20/22 2348    Specimen: Blood, Venous Updated: 08/24/22 0402     Culture No growth at 72 hours    Blood Culture Blood, Venous [[id number]]  (Normal) Collected: 08/20/22 2348    Specimen: Blood, Venous Updated: 08/24/22 0402     Culture No growth at 72 hours          Pathology Results     ** No results found for the last 720 hours. **          Echo:         Imaging:    Radiology Imaging    XR ABDOMEN 1 VW    Narrative  CLINICAL HISTORY: Abdominal bloating.    --    Impression  Fecal matter in the colon.    COMMENT: AP radiographs of the abdomen and pelvis reveal air and fecal matter  within the colon.  There is no definite evidence of small bowel obstruction or  free intraperitoneal air.  Comparison is made to a CT scan dated 7/20/2015.      Assessment     Right gluteal cellulitis and abscess   - surgery following, s/p bedside I&D on 8/22, with wound packing, no cultures obtained at that time  - wound c/s obtained 8/23 with staph aureus, susc pending  - afebrile  - no leukocytosis  - CT reviewed  - blood c/s sterile      COVID 19 Exposure- on isolation, SARS CoV2 PCR negative on 8/20 and 8/22  - no hypoxia or cough     Allergy to penicillin caused hives, Ceftin caused local arm pain, Sulfonamides caused rash, ciprofloxacin caused joint pain, stats she cannot take doxycycline but not listed in her allergies, has not tried Zyvox in the past          Plan     Continue Aztreonam and Vanco pending further c/s analysis, may likely  require IV antibiotics until resolution given her extensive allergies

## 2022-08-24 NOTE — PROGRESS NOTES
66-year-old female with right buttock abscess status post I&D on 8/22/2022  Pain much less  No new complaints      Vitals:    08/23/22 1900 08/23/22 2300 08/24/22 0855 08/24/22 1423   BP: (!) 142/77 (!) 146/72 132/80 136/71   BP Location: Right upper arm Right upper arm Right upper arm Right upper arm   Patient Position: Sitting Sitting Standing    Pulse: 85 80     Resp: 18 18 18 18   Temp: 36.6 °C (97.9 °F) 36.8 °C (98.2 °F) 36.5 °C (97.7 °F) 36.6 °C (97.9 °F)   TempSrc: Oral Oral Oral Oral   SpO2: 98% 99% 99% 99%   Weight:       Height:         No intake/output data recorded.  Awake, alert, and oriented ×3  Packing removed.  Surrounding induration but no fluctuance    Results from last 7 days   Lab Units 08/24/22  0409 08/23/22  0537 08/22/22  1044   WBC K/uL 4.68 6.06 7.14   HEMOGLOBIN g/dL 12.6 13.0 12.2   HEMATOCRIT % 38.4 39.0 36.5   PLATELETS K/uL 272 258 206     Results from last 7 days   Lab Units 08/24/22  0409 08/23/22  0537 08/22/22  1044 08/20/22  2348   SODIUM mEQ/L 139 138 136 136   POTASSIUM mEQ/L 3.8 4.0 3.9 4.0   CHLORIDE mEQ/L 105 106 103 102   CO2 mEQ/L 28 26 28 27   BUN mg/dL 10 10 10 10   CREATININE mg/dL 0.5* 0.6 0.7 0.7   CALCIUM mg/dL 9.0 9.0 8.9 9.2   ALBUMIN g/dL 3.4  --   --  3.8   BILIRUBIN TOTAL mg/dL 0.6  --   --  0.9   ALK PHOS IU/L 57  --   --  71   ALT IU/L 23  --   --  23   AST IU/L 18  --   --  23   GLUCOSE mg/dL 89 93 93 114*         Assessment/plan: Right buttock abscess  Sitz bath's/showers  No objection to discharge on oral antibiotic  Discussed with Dr. May  Would have her follow-up in our office in 6 weeks time  Patient aware the possibility that this may be a perirectal abscess

## 2022-08-24 NOTE — PROGRESS NOTES
Vancomycin Dosing by Pharmacy Consult Follow up    Adeola Rees is a 66 y.o. female who has been consulted for vancomycin dosing for bacterial skin and skin structure infection.    Reviewed relevant clinical data including weight, renal function, previous vancomycin doses, and vancomycin levels  Creatinine   Date/Time Value Ref Range Status   08/24/2022 0409 0.5 (L) 0.6 - 1.1 mg/dL Final   08/23/2022 0537 0.6 0.6 - 1.1 mg/dL Final   08/22/2022 1044 0.7 0.6 - 1.1 mg/dL Final     Vancomycin Tr   Date/Time Value Ref Range Status   08/24/2022 1030 14.9 10.0 - 15.0 ug/mL Final     Comment:     For all patients with complicated infections with methicillin resistant Staphylococcus aureus (MRSA), e.g. endocarditis, osteomyelitis, meningitis, pneumonia; the vancomycin trough therpeutic range is 15-20 ug/mL.          Vancomycin Administrations (last 96 hours)       Date/Time Action Medication Dose Rate    08/23/22 2340 New Bag    vancomycin 1.25 gram/250 mL IVPB in NSS 1,250 mg 166.7 mL/hr    08/23/22 1127 New Bag    vancomycin 1.25 gram/250 mL IVPB in NSS 1,250 mg 166.7 mL/hr    08/22/22 2243 New Bag    vancomycin 1.25 gram/250 mL IVPB in NSS 1,250 mg 166.7 mL/hr    08/22/22 1235 New Bag    vancomycin 1.25 gram/250 mL IVPB in NSS 1,250 mg 166.7 mL/hr    08/21/22 2335 New Bag    vancomycin 1.25 gram/250 mL IVPB in NSS 1,250 mg 166.7 mL/hr    08/21/22 1215 New Bag    vancomycin 1.25 gram/250 mL IVPB in NSS 1,250 mg 166.7 mL/hr    08/20/22 2354 New Bag    vancomycin 1.5 g/500 mL IVPB in NSS 1,500 mg 250 mL/hr              Assessment/Plan  The patient is ordered vancomycin dosing by pharmacy.      The patients renal function; is improving    Vancomycin trough level 14.9 on maintenance dose of 1250 mg IV Q 12 H with a goal trough 10-15 ug/mL.      Will continue vancomycin 1250 mg IV Q 12 H .      Next trough:  8/26/22 @ 10:30    Pharmacy will continue to follow the patients vancomycin dosing daily during this course of  therapy.      Please call vancomycin levels to the pharmacy.  Nayan Lewis, formerly [facility]

## 2022-08-24 NOTE — PLAN OF CARE
Problem: Adult Inpatient Plan of Care  Goal: Plan of Care Review  Outcome: Progressing  Flowsheets (Taken 8/24/2022 1815)  Progress: no change  Plan of Care Reviewed With: patient  Outcome Summary: Pt aaox3, no complaints of pain. IV abx as ordered. Pt doing sitz bath/shower. Covid quarentine continued. Call bell in reach, safety precautions in place.   Plan of Care Review  Plan of Care Reviewed With: patient  Progress: no change  Outcome Summary: Pt aaox3, no complaints of pain. IV abx as ordered. Pt doing sitz bath/shower. Covid quarentine continued. Call bell in reach, safety precautions in place.

## 2022-08-25 LAB — SARS-COV-2 RNA RESP QL NAA+PROBE: NEGATIVE

## 2022-08-25 PROCEDURE — 25800000 HC PHARMACY IV SOLUTIONS: Performed by: HOSPITALIST

## 2022-08-25 PROCEDURE — 12000000 HC ROOM AND CARE MED/SURG

## 2022-08-25 PROCEDURE — 99233 SBSQ HOSP IP/OBS HIGH 50: CPT | Performed by: HOSPITALIST

## 2022-08-25 PROCEDURE — 63600000 HC DRUGS/DETAIL CODE: Performed by: HOSPITALIST

## 2022-08-25 PROCEDURE — U0003 INFECTIOUS AGENT DETECTION BY NUCLEIC ACID (DNA OR RNA); SEVERE ACUTE RESPIRATORY SYNDROME CORONAVIRUS 2 (SARS-COV-2) (CORONAVIRUS DISEASE [COVID-19]), AMPLIFIED PROBE TECHNIQUE, MAKING USE OF HIGH THROUGHPUT TECHNOLOGIES AS DESCRIBED BY CMS-2020-01-R: HCPCS | Performed by: HOSPITALIST

## 2022-08-25 PROCEDURE — 63700000 HC SELF-ADMINISTRABLE DRUG: Performed by: NURSE PRACTITIONER

## 2022-08-25 PROCEDURE — 63700000 HC SELF-ADMINISTRABLE DRUG: Performed by: HOSPITALIST

## 2022-08-25 PROCEDURE — 99024 POSTOP FOLLOW-UP VISIT: CPT | Performed by: SURGERY

## 2022-08-25 PROCEDURE — 25000000 HC PHARMACY GENERAL: Performed by: HOSPITALIST

## 2022-08-25 RX ORDER — IBUPROFEN/PSEUDOEPHEDRINE HCL 200MG-30MG
3 TABLET ORAL ONCE
Status: COMPLETED | OUTPATIENT
Start: 2022-08-25 | End: 2022-08-25

## 2022-08-25 RX ADMIN — SENNOSIDES 1 TABLET: 8.6 TABLET, FILM COATED ORAL at 08:56

## 2022-08-25 RX ADMIN — VANCOMYCIN HYDROCHLORIDE 1250 MG: 500 INJECTION, POWDER, LYOPHILIZED, FOR SOLUTION INTRAVENOUS at 23:39

## 2022-08-25 RX ADMIN — AZTREONAM 1 G: 1 INJECTION, POWDER, LYOPHILIZED, FOR SOLUTION INTRAMUSCULAR; INTRAVENOUS at 05:25

## 2022-08-25 RX ADMIN — SENNOSIDES 1 TABLET: 8.6 TABLET, FILM COATED ORAL at 21:01

## 2022-08-25 RX ADMIN — POLYETHYLENE GLYCOL 3350 17 G: 17 POWDER, FOR SOLUTION ORAL at 08:56

## 2022-08-25 RX ADMIN — CLOTRIMAZOLE AND BETAMETHASONE DIPROPIONATE: 10; .5 CREAM TOPICAL at 21:01

## 2022-08-25 RX ADMIN — Medication 3 MG: at 23:39

## 2022-08-25 RX ADMIN — VANCOMYCIN HYDROCHLORIDE 1250 MG: 500 INJECTION, POWDER, LYOPHILIZED, FOR SOLUTION INTRAVENOUS at 11:14

## 2022-08-25 RX ADMIN — NICOTINE 1 PATCH: 14 PATCH, EXTENDED RELEASE TRANSDERMAL at 08:56

## 2022-08-25 RX ADMIN — AZTREONAM 1 G: 1 INJECTION, POWDER, LYOPHILIZED, FOR SOLUTION INTRAMUSCULAR; INTRAVENOUS at 13:18

## 2022-08-25 RX ADMIN — VANCOMYCIN HYDROCHLORIDE 1250 MG: 500 INJECTION, POWDER, LYOPHILIZED, FOR SOLUTION INTRAVENOUS at 00:30

## 2022-08-25 RX ADMIN — CLOTRIMAZOLE AND BETAMETHASONE DIPROPIONATE: 10; .5 CREAM TOPICAL at 08:56

## 2022-08-25 NOTE — PROGRESS NOTES
Hospital Medicine Service -  Daily Progress Note       SUBJECTIVE   Anxious about having reactions to antibiotics.  No cough/sob  D/w ID    ROS negative other than noted above.     OBJECTIVE      Vital signs in last 24 hours:  Temp:  [36.6 °C (97.9 °F)-36.7 °C (98.1 °F)] 36.7 °C (98.1 °F)  Heart Rate:  [84] 84  Resp:  [18] 18  BP: (145-155)/(63-81) 155/63    Intake/Output Summary (Last 24 hours) at 8/25/2022 1527  Last data filed at 8/24/2022 2111  Gross per 24 hour   Intake 100 ml   Output --   Net 100 ml       PHYSICAL EXAMINATION      Physical Exam   Gen:  Well developed and nourished, in NAD  HEENT:NCAT, EOMI, Hearing Intact, Nasal Passages Clear, Moist Oral Mucosa  Neck:  FROM, Supple  Lungs: CTA B,  No RRW, no accessory muscle use, good air exchange  CVS: S1 S2 RRR, no mrg  Abd:  soft, NT no rebound/guarding, BS +  Msk: FROM in all joints  Neuro: A&O x 3, no focal deficits, no sensory deficits  Psyhe: Anxious   Skin: Right buttock wound seen with ID, erythema essentially resolved, no drainage, not tender  Ext: No edema x 2   LINES, CATHETERS, DRAINS, AIRWAYS, AND WOUNDS   Lines, Drains, Airways, Wounds:  Peripheral IV (Adult) 08/23/22 Left;Posterior Hand (Active)   Number of days: 1       Rash 08/22/22 1850 Left lower thoracic spine (Active)   Number of days: 2       Rash 08/22/22 1850 Left upper thoracic spine (Active)   Number of days: 2       Comments:      LABS / IMAGING / TELE      I have reviewed all labs and imaging results. Please see Problem List/A/P for relevant findings.      ASSESSMENT AND PLAN      * Abscess of right buttock  Assessment & Plan  Presented with right buttock pain, redness, swelling.  No fever or leukocytosis.  CT pelvis with induration medial right buttock with small tissue nodule,  Possibly evolving phlegmon or abscess measuring up to 2 cm in diameter.    S/p bedside I&D by general surgery 8/22 and wound packing placed  Packing removed 8/23 and can start sitz  baths/showers  Needs outpatient follow-up with surgery Dr Garces in 6 weeks  Vanco D6  Duration of abx TBD      Rash  Assessment & Plan  Complains of rash on back and under breast folds which she attributes to sweating  She does not think this is a drug allergy  She has history of similar rash and has been treated by dermatologist  Requests combination nystatin and steroid cream like at home  Benadryl prn  Monitor for improvement    Exposure to COVID-19 virus  Assessment & Plan  Adeola Rees may have been exposed to COVID 19 while in the hospital.      Adeola Rees is currently hospitalized.      The patient  was notified of the potential exposure by Alana Ayoub PA-C.  The patient currently does not have symptoms.      The patient  express(es) understanding of the potential exposure and all questions were answered.      The patient  was educated on the signs and symptoms of COVID 19.  The patient was recommended to quarantine for 10 days.  They were also educated on when to call their PCP or potentially call 911 if an emergency.      We offered COVID testing after the potential exposure.  The patient  does want follow up testing and we will help facilitate testing for the patient.      Jose May MD    Covid-19 PCR negative on 8/20 and 8/22  Day 5 Covid test on 8/25 negative    Smoker  Assessment & Plan  Continue nicotine patch  Smoking cessation    Constipation  Assessment & Plan  Continue bowel regimen       VTE Assessment: Padua VTE Score: 1  VTE Prophylaxis Plan: ambulating in room  Code Status: Full Code  Estimated Discharge Date: 8/25/2022  Disposition Planning: home pending transition to PO abx     Jose May MD  8/25/2022

## 2022-08-25 NOTE — PROGRESS NOTES
66-year-old female with right buttock abscess status post I&D on 8/22/2022  Pain much less  No new complaints      Vitals:    08/24/22 0855 08/24/22 1423 08/24/22 1900 08/25/22 0000   BP: 132/80 136/71 (!) 150/81 (!) 145/80   BP Location: Right upper arm Right upper arm Right upper arm Right upper arm   Patient Position: Standing  Lying Lying   Pulse:       Resp: 18 18 18 18   Temp: 36.5 °C (97.7 °F) 36.6 °C (97.9 °F) 36.6 °C (97.9 °F) 36.7 °C (98 °F)   TempSrc: Oral Oral  Oral   SpO2: 99% 99% 100% 100%   Weight:       Height:         I/O last 3 completed shifts:  In: 100 [IV Piggyback:100]  Out: -   Awake, alert, and oriented ×3  Decreasing induration, no fluctuance, not much in way of erythema    Results from last 7 days   Lab Units 08/24/22  0409 08/23/22  0537 08/22/22  1044   WBC K/uL 4.68 6.06 7.14   HEMOGLOBIN g/dL 12.6 13.0 12.2   HEMATOCRIT % 38.4 39.0 36.5   PLATELETS K/uL 272 258 206     Results from last 7 days   Lab Units 08/24/22  0409 08/23/22  0537 08/22/22  1044 08/20/22  2348   SODIUM mEQ/L 139 138 136 136   POTASSIUM mEQ/L 3.8 4.0 3.9 4.0   CHLORIDE mEQ/L 105 106 103 102   CO2 mEQ/L 28 26 28 27   BUN mg/dL 10 10 10 10   CREATININE mg/dL 0.5* 0.6 0.7 0.7   CALCIUM mg/dL 9.0 9.0 8.9 9.2   ALBUMIN g/dL 3.4  --   --  3.8   BILIRUBIN TOTAL mg/dL 0.6  --   --  0.9   ALK PHOS IU/L 57  --   --  71   ALT IU/L 23  --   --  23   AST IU/L 18  --   --  23   GLUCOSE mg/dL 89 93 93 114*         Assessment/plan: Right buttock abscess  Sitz bath's/showers  No objection to discharge on oral antibiotic  Discussed with Dr. May  Would have her follow-up in our office in 6 weeks time  Patient aware the possibility that this may be a perirectal abscess

## 2022-08-25 NOTE — PATIENT CARE CONFERENCE
Care Progression Rounds Note  Date: 8/25/2022  Time: 10:48 AM     Patient Name: Adeola Rees     Medical Record Number: [id number]   YOB: 1955  Sex: Female      Room/Bed: 4206W    Admitting Diagnosis: Cellulitis of buttock [L03.317]   Admit Date/Time: 8/20/2022 10:51 PM    Primary Diagnosis: Abscess of right buttock  Principal Problem: Abscess of right buttock    GMLOS: 3.2  Anticipated Discharge Date: 8/25/2022    AM-PAC:  Mobility Score:      Discharge Planning:  Current Living Arrangements: home  Anticipated Discharge Disposition: home without assistance or services    Barriers to Discharge:  Medical issues not resolved    Comments:  IV abx, sitz baths, COVID swab today, d/c today? PO abx, f/u OP    Participants:  , nursing, social work/services

## 2022-08-25 NOTE — PROGRESS NOTES
"Infectious Disease Progress Note    Patient Name: Adeola Rees  MR#: [id number]  : 1955  Admission Date: 2022  Date: 22   Time: 11:38 AM   Author: RAI Hinojosa    Major Events:     Antibiotics:    Anti-infectives (From admission, onward)    Start     Dose/Rate Route Frequency Ordered Stop    22 1145  clotrimazole-betamethasone (LOTRISONE) 1-0.05 % cream          Topical 2 times daily 22 1046      22 1130  vancomycin 1.25 gram/250 mL IVPB in NSS        \"And\" Linked Group Details    1,250 mg  166.7 mL/hr over 90 Minutes intravenous Every 12 hours interval 22 0404      22 0345  aztreonam (AZACTAM) IVPB 1 g in 100 mL NSS vial in bag         1 g  200 mL/hr over 30 Minutes intravenous Every 8 hours interval 22 0334            Subjective  Patient states she is nervous to take any new medications because she is so prone to having allergic reactions, she states she is doing fine with the current antibiotics and no reactions, d/w Dr May at the bedside as well    Review of Systems    Pertinent items are noted in HPI.    Objective     Vital Signs:    Patient Vitals for the past 72 hrs:   BP Temp Temp src Pulse Resp SpO2   22 0818 (!) 155/63 36.7 °C (98.1 °F) Oral 84 -- 100 %   22 0000 (!) 145/80 36.7 °C (98 °F) Oral -- 18 100 %   22 1900 (!) 150/81 36.6 °C (97.9 °F) -- -- 18 100 %   22 1423 136/71 36.6 °C (97.9 °F) Oral -- 18 99 %   22 0855 132/80 36.5 °C (97.7 °F) Oral -- 18 99 %   22 2300 (!) 146/72 36.8 °C (98.2 °F) Oral 80 18 99 %   22 1900 (!) 142/77 36.6 °C (97.9 °F) Oral 85 18 98 %   22 1500 104/65 36.8 °C (98.2 °F) Oral -- 18 97 %   22 0836 115/61 36.7 °C (98.1 °F) Oral -- 18 99 %   22 2300 133/60 37 °C (98.6 °F) Oral 86 18 97 %   22 (!) 147/92 37.2 °C (99 °F) Oral (!) 103 18 100 %   22 -- -- -- (!) 102 (P) 17 98 %   22 1859 (!) 152/77 36.8 °C (98.3 °F) Oral " "(!) 107 20 99 %   22 1839 -- -- -- (!) 103 -- --   22 1501 (!) 130/57 36.7 °C (98.1 °F) Oral 95 18 100 %       Temp (72hrs), Av.8 °C (98.2 °F), Min:36.5 °C (97.7 °F), Max:37.2 °C (99 °F)      Physical Exam:    Visit Vitals  BP (!) 155/63 (BP Location: Right upper arm, Patient Position: Sitting)   Pulse 84   Temp 36.7 °C (98.1 °F) (Oral)   Resp 18   Ht 1.651 m (5' 5\")   Wt 74.4 kg (164 lb)   LMP  (LMP Unknown)   SpO2 100%   Breastfeeding No   BMI 27.29 kg/m²     General appearance: alert, appears stated age, cooperative and no distress   Eyes: anicteric  Lungs: nonlabored, no cough observed  Skin: minimal erythema of the right gluteal area without warmth, incision without drainage, mild erythema at the perirectal area and incision area with minimal induration, no tenderness  Neurologic: Mental status: Alert, oriented, thought content appropriate    Lines, Drains, Airways, Wounds:  Peripheral IV (Adult) 22 Left;Posterior Hand (Active)   Number of days: 2       Rash 22 1850 Left lower thoracic spine (Active)   Number of days: 3       Rash 22 1850 Left upper thoracic spine (Active)   Number of days: 3       Labs:    CBC Results       22     0409 0537 1044    WBC 4.68 6.06 7.14    RBC 4.16 4.20 3.89    HGB 12.6 13.0 12.2    HCT 38.4 39.0 36.5    MCV 92.3 92.9 93.8    MCH 30.3 31.0 31.4    MCHC 32.8 33.3 33.4     258 206      BMP Results       22     0409 0537 1044     138 136    K 3.8 4.0 3.9    Cl 105 106 103    CO2 28 26 28    Glucose 89 93 93    BUN 10 10 10    Creatinine 0.5 0.6 0.7    Calcium 9.0 9.0 8.9    Anion Gap 6 6 5    EGFR >60.0 >60.0 >60.0      PT/PTT Results    No lab values to display.     UA Results       21     0338    Color Yellow    Clarity Clear    Glucose Negative    Bilirubin Negative    Ketones Negative    Sp Grav <=1.005    Blood Negative    Ph 7.0    Protein Negative    Urobilinogen 0.2    " Nitrite Negative    Leuk Est +1    WBC 0 TO 3    RBC 0 TO 4    Bacteria None Seen         Comment for Blood at 0338 on 12/19/21: The sensitivity of the occult blood test is equivalent to approximately 4 intact RBC/HPF.      Lactate Results    No lab values to display.         Microbiology Results     Procedure Component Value Units Date/Time    Anaerobic Culture / Smear (includes Aerobic Culture) Buttock, Right [[id number]]  (Abnormal)  (Susceptibility) Collected: 08/23/22 1447    Specimen: Wound Swab from Buttock, Right Updated: 08/25/22 0847     Culture **Positive Culture**      3+ Staphylococcus aureus     Gram Stain Result No WBC Seen      No organisms seen    SARS-CoV-2 (COVID-19), PCR Nasopharynx [[id number]]  (Normal) Collected: 08/22/22 1502    Specimen: Nasopharyngeal Swab from Nasopharynx Updated: 08/22/22 1547    Narrative:      The following orders were created for panel order SARS-CoV-2 (COVID-19), PCR Nasopharynx.  Procedure                               Abnormality         Status                     ---------                               -----------         ------                     SARS-CoV-2 (COVID-19), P...[[id number]]  Normal              Final result                 Please view results for these tests on the individual orders.    SARS-CoV-2 (COVID-19), PCR Nasopharynx [[id number]]  (Normal) Collected: 08/22/22 1502    Specimen: Nasopharyngeal Swab from Nasopharynx Updated: 08/22/22 1547     SARS-CoV-2 (COVID-19) Negative    Narrative:      Testing performed using real-time PCR for detection of COVID-19. EUA approved validation studies performed on site.     SARS-CoV-2 (COVID-19), PCR Nasopharynx [[id number]]  (Normal) Collected: 08/20/22 2351    Specimen: Nasopharyngeal Swab from Nasopharynx Updated: 08/21/22 0028    Narrative:      The following orders were created for panel order SARS-CoV-2 (COVID-19), PCR Nasopharynx.  Procedure                               Abnormality         Status                      ---------                               -----------         ------                     SARS-CoV-2 (COVID-19), P...[[id number]]  Normal              Final result                 Please view results for these tests on the individual orders.    SARS-CoV-2 (COVID-19), PCR Nasopharynx [[id number]]  (Normal) Collected: 08/20/22 2351    Specimen: Nasopharyngeal Swab from Nasopharynx Updated: 08/21/22 0028     SARS-CoV-2 (COVID-19) Negative    Narrative:      Testing performed using real-time PCR for detection of COVID-19. EUA approved validation studies performed on site.     Blood Culture Blood, Venous [[id number]]  (Normal) Collected: 08/20/22 2348    Specimen: Blood, Venous Updated: 08/25/22 0402     Culture No growth at 96 hours    Blood Culture Blood, Venous [[id number]]  (Normal) Collected: 08/20/22 2348    Specimen: Blood, Venous Updated: 08/25/22 0402     Culture No growth at 96 hours          Pathology Results     ** No results found for the last 720 hours. **          Echo:         Imaging:    Radiology Imaging    XR ABDOMEN 1 VW    Narrative  CLINICAL HISTORY: Abdominal bloating.    --    Impression  Fecal matter in the colon.    COMMENT: AP radiographs of the abdomen and pelvis reveal air and fecal matter  within the colon.  There is no definite evidence of small bowel obstruction or  free intraperitoneal air.  Comparison is made to a CT scan dated 7/20/2015.      Assessment     Right gluteal cellulitis and abscess - improving  - surgery following, s/p bedside I&D on 8/22, no cultures obtained at that time  - wound c/s obtained 8/23 with MSSA  - afebrile  - no leukocytosis  - CT reviewed  - blood c/s sterile      COVID 19 Exposure- on isolation, SARS CoV2 PCR negative on 8/20 and 8/22  - no hypoxia or cough     Allergy to penicillin caused hives, Ceftin caused local arm pain, Sulfonamides caused rash, ciprofloxacin caused joint pain, stats she cannot take doxycycline but not listed in her allergies, has not  tried Zyvox in the past         Plan     Continue Vanco for now, d/w Dr May

## 2022-08-25 NOTE — PLAN OF CARE
Problem: Adult Inpatient Plan of Care  Goal: Plan of Care Review  Outcome: Progressing  Flowsheets (Taken 8/25/2022 0527)  Progress: improving  Plan of Care Reviewed With: patient  Outcome Summary: Pt remains aaox, meds as ordered and tolerating PO intake. Continues on covid quarantine. Call bell in reach and will continue to monitor.  Goal: Patient-Specific Goal (Individualized)  Outcome: Progressing  Flowsheets (Taken 8/25/2022 0527)  Patient-Specific Goals (Include Timeframe): get well and d/c home  Individualized Care Needs: Abx therapy and covid quarantine  Goal: Absence of Hospital-Acquired Illness or Injury  Outcome: Progressing  Goal: Optimal Comfort and Wellbeing  Outcome: Progressing  Goal: Readiness for Transition of Care  Outcome: Progressing     Problem: Skin Injury Risk Increased  Goal: Skin Health and Integrity  Outcome: Progressing     Problem: Fall Injury Risk  Goal: Absence of Fall and Fall-Related Injury  Outcome: Progressing     Problem: Infection  Goal: Absence of Infection Signs and Symptoms  Outcome: Progressing   Plan of Care Review  Plan of Care Reviewed With: patient  Progress: improving  Outcome Summary: Pt remains aaox, meds as ordered and tolerating PO intake. Continues on covid quarantine. Call bell in reach and will continue to monitor.

## 2022-08-25 NOTE — PLAN OF CARE
Problem: Adult Inpatient Plan of Care  Goal: Patient-Specific Goal (Individualized)  Outcome: Progressing  Flowsheets (Taken 8/25/2022 1230)  Individualized Care Needs: IV abx, COVID quarantine maintained.   Plan of Care Review  Plan of Care Reviewed With: patient  Progress: improving  Outcome Summary: AAOX4. Vital signs as documented. Remains on COVID quarantine. IV abx infusing as ordered. Sitz baths as tolerated. All safety precautions in place. Call bell within reach.

## 2022-08-26 VITALS
DIASTOLIC BLOOD PRESSURE: 62 MMHG | WEIGHT: 164 LBS | HEIGHT: 65 IN | RESPIRATION RATE: 16 BRPM | BODY MASS INDEX: 27.32 KG/M2 | SYSTOLIC BLOOD PRESSURE: 134 MMHG | OXYGEN SATURATION: 100 % | TEMPERATURE: 97.9 F | HEART RATE: 69 BPM

## 2022-08-26 LAB
BACTERIA BLD CULT: NORMAL
BACTERIA BLD CULT: NORMAL

## 2022-08-26 PROCEDURE — 99239 HOSP IP/OBS DSCHRG MGMT >30: CPT | Performed by: HOSPITALIST

## 2022-08-26 PROCEDURE — 63700000 HC SELF-ADMINISTRABLE DRUG: Performed by: HOSPITALIST

## 2022-08-26 PROCEDURE — 99024 POSTOP FOLLOW-UP VISIT: CPT | Performed by: SURGERY

## 2022-08-26 RX ORDER — ACETAMINOPHEN 325 MG/1
650 TABLET ORAL EVERY 4 HOURS PRN
Start: 2022-08-26 | End: 2022-08-31 | Stop reason: HOSPADM

## 2022-08-26 RX ORDER — CLOTRIMAZOLE AND BETAMETHASONE DIPROPIONATE 10; .64 MG/G; MG/G
CREAM TOPICAL 2 TIMES DAILY
Qty: 45 G | Refills: 1 | Status: SHIPPED | OUTPATIENT
Start: 2022-08-26 | End: 2022-09-25

## 2022-08-26 RX ADMIN — NICOTINE 1 PATCH: 14 PATCH, EXTENDED RELEASE TRANSDERMAL at 09:01

## 2022-08-26 RX ADMIN — CLOTRIMAZOLE AND BETAMETHASONE DIPROPIONATE: 10; .5 CREAM TOPICAL at 09:03

## 2022-08-26 NOTE — DISCHARGE SUMMARY
[facility] Medicine Service -  Inpatient Discharge Summary        BRIEF OVERVIEW   Admitting Provider: Pa WREN MD  Attending Provider: Jose May MD Attending phys phone: [phone]    PCP: Tamara Valdivia [phone]    Admission Date: 8/20/2022  Discharge Date: 8/26/2022     Hospital Course:  Adeola Rees is a 66-year-old female who presented to the hospital with right buttock pain and was found to have an abscess.  She underwent bedside debridement by surgery on 8/22, wound cultures grew MSSA.  Patient has multiple allergies and she was very hesitant to take any oral medications.  She completed a 7-day course of vancomycin in the hospital.  She had local wound care and sitz bath's in the hospital.  She would follow-up with general surgery in 6 weeks for wound check. She had exposure to covid while hospitalized but remained symptom free and had 2 negative tests post exposure.    Please see problem list and complete chart for further details related to hospitalization.  Greater than 35 minutes spent on discharge coordination. Includes discussing discharge with consultants, family, SW, nursing.        DISCHARGE DIAGNOSES      Primary Discharge Diagnosis  Abscess of right buttock    Secondary Discharge Diagnoses  Active Hospital Problems    Diagnosis Date Noted    Abscess of right buttock 08/21/2022     Priority: High    Rash 08/23/2022    Exposure to COVID-19 virus 08/22/2022    Smoker 09/11/2020    Constipation 02/14/2019      Resolved Hospital Problems   No resolved problems to display.       Problem List on Day of Discharge  * Abscess of right buttock  Assessment & Plan  Presented with right buttock pain, redness, swelling.  No fever or leukocytosis.  CT pelvis with induration medial right buttock with small tissue nodule,  Possibly evolving phlegmon or abscess measuring up to 2 cm in diameter.    S/p bedside I&D by general surgery 8/22 and wound packing placed  Packing  removed 8/23 and can start sitz baths/showers  Needs outpatient follow-up with surgery Dr Garces in 6 weeks  Vanco D6  Duration of abx TBD      Rash  Assessment & Plan  Complains of rash on back and under breast folds which she attributes to sweating  She does not think this is a drug allergy  She has history of similar rash and has been treated by dermatologist  Requests combination nystatin and steroid cream like at home  Benadryl prn  Monitor for improvement    Exposure to COVID-19 virus  Assessment & Plan  Adeola Rees may have been exposed to COVID 19 while in the hospital.      Adeola Rees is currently hospitalized.      The patient  was notified of the potential exposure by Alana Ayoub PA-C.  The patient currently does not have symptoms.      The patient  express(es) understanding of the potential exposure and all questions were answered.      The patient  was educated on the signs and symptoms of COVID 19.  The patient was recommended to quarantine for 10 days.  They were also educated on when to call their PCP or potentially call 911 if an emergency.      We offered COVID testing after the potential exposure.  The patient  does want follow up testing and we will help facilitate testing for the patient.      Jose May MD    Covid-19 PCR negative on 8/20 and 8/22  Day 5 Covid test on 8/25 negative    Smoker  Assessment & Plan  Continue nicotine patch  Smoking cessation    Constipation  Assessment & Plan  Continue bowel regimen      SUMMARY OF HOSPITALIZATION          Exam on Day of Discharge  Physical Exam   Patient seen and examined on the day of discharge, exam stable from prior evaluations  Anxious female in no distress no left leg buttock wound with 5 mm opening without any drainage comment no surrounding tenderness or redness    Consults During Admission  IP CONSULT TO INFECTIOUS DISEASE  IP CONSULT TO GENERAL SURGERY    DISCHARGE MEDICATIONS               Medication List      ASK your  doctor about these medications    CALCIUM ORAL  Take by mouth. Natural Vitality Calm and Calcium 1 tsp BID     cholecalciferol (vitamin D3) 5,000 unit (125 mcg) capsule  Take 5,000 Units by mouth daily.  Dose: 5,000 Units     MAGNESIUM ORAL  Take by mouth.     nicotine polacrilex 4 mg lozenge  Commonly known as: COMMIT  Apply 4 mg to cheek See admin instr. q 5 hours PRN  Dose: 4 mg     RETIN-A TOP  Apply topically.     VITAMIN B-12 ORAL  Take 5,000 mcg by mouth. Vitamin shoppe  Dose: 5,000 mcg              PROCEDURES / LABS / IMAGING        SARS-CoV-2 (COVID-19) (no units)   Date/Time Value   08/25/2022 1212 Negative       Pertinent Imaging  CT PELVIS WITHOUT IV CONTRAST    Result Date: 8/21/2022  IMPRESSION: There is induration in the medial right buttock with a small soft tissue nodule, possibly an evolving phlegmon or abscess which measures up to 2 cm in diameter. No soft tissue gas.  No discernible association with the anorectal region which can be confirmed with perianal fistula protocol MR imaging, as indicated. Preliminary results were provided by Dr. Wakefield at 2:44 AM on 8/21/2022. Comparison study:  Abdominopelvic CT July 2015 COMMENT:  Axial CT images of the pelvis are completed without oral or intravenous contrast. Images are reconstructed in sagittal and coronal planes. CT DOSE:  One or more dose reduction techniques (e.g. automated exposure control, adjustment of the mA and/or kV according to patient size, use of iterative reconstruction technique) utilized for this examination. There is marked induration in the medial right buttock extending to the gluteal crease.  There is no approximately 2 cm area of more focal, somewhat rounded soft tissue attenuation could represent an evolving phlegmon/abscess.  There is no discernible connection to the anorectal region to support perianal fistula although this is best evaluated with perianal fistula protocol MRI. No soft tissue gas or visible opening to the  skin Moderately distended urinary bladder. Uterus surgically absent. No adnexal mass. Colonic diverticula noted. No adenopathy in the imaged volume. There is multilevel degenerative disc disease throughout the imaged lumbar spine, again noting advanced disc space narrowing at the lumbosacral junction, with stable findings since 2015.  There is a stable degenerative anterolisthesis of L4 on L5.      OUTPATIENT  FOLLOW-UP / REFERRALS / PENDING TESTS        Outpatient Follow-Up Appointments  Encounter Information    This patient does not currently have any appointments scheduled.         Referrals  No orders of the defined types were placed in this encounter.      Test Results Pending at Discharge  Unresulted Labs (From admission, onward)             Start     Ordered    08/26/22 1030  Vancomycin, trough  Once        Question:  Release to patient  Answer:  Immediate    08/24/22 1124                Important Issues to Address in Follow-Up  You were treated for a left buttock abscess.  You were treated with 7 days of IV antibiotics  You do not require any further antibiotics    Continue Sitz Baths once a day for one more week.  Cover wound with clean dry gauze.    Please follow-up with Dr. Garces (surgeon) in 6 weeks for follow up  Follow up with Dr Lassiter within one week for hospital follow up.    Return to the hospital if you have fevers, increased redness or drainage from the wound.    DISCHARGE DISPOSITION AND DESTINATION      Disposition: Home   Destination: Home                            Code Status At Discharge: Full Code    Physician Order for Life-Sustaining Treatment Document Status      No documents found

## 2022-08-26 NOTE — PATIENT CARE CONFERENCE
Care Progression Rounds Note  Date: 8/26/2022  Time: 10:32 AM     Patient Name: Adeola Rees     Medical Record Number: [id number]   YOB: 1955  Sex: Female      Room/Bed: 4206W    Admitting Diagnosis: Cellulitis of buttock [L03.317]   Admit Date/Time: 8/20/2022 10:51 PM    Primary Diagnosis: Abscess of right buttock  Principal Problem: Abscess of right buttock    GMLOS: 3  Anticipated Discharge Date: 8/27/2022    AM-PAC:  Mobility Score:      Discharge Planning:  Current Living Arrangements: home  Anticipated Discharge Disposition: home without assistance or services    Barriers to Discharge:  Test pending, Medical issues not resolved, Consultant recommendations pending    Comments:  IV vanco; ID recommendation    Participants:  , nursing

## 2022-08-26 NOTE — PLAN OF CARE
Problem: Adult Inpatient Plan of Care  Goal: Patient-Specific Goal (Individualized)  Outcome: Progressing  Flowsheets (Taken 8/26/2022 1135)  Individualized Care Needs: IV abx, COVID quarantine maintained.   Plan of Care Review  Plan of Care Reviewed With: patient  Progress: improving  Outcome Summary: Remains on isolation. Vital signs as documented. Waiting on ID and hospitalist to say if pt can discharge today. Safety measures maintain.

## 2022-08-26 NOTE — PLAN OF CARE
Plan of Care Review  Plan of Care Reviewed With: patient  Progress: no change  Outcome Summary: Patient AAOX3. IV antibiotic as ordered. Pt complainnig of  blood in toilet after use. staff didn't notice any blood. Asked pt to notify the staff if it happen agian. ambulate to bathroom. Call bell within reachand pt reused bed alarm.

## 2022-08-26 NOTE — PROGRESS NOTES
Brief Nutrition Assessment Reason for consult:  LOS    Nutrition Interventions/Recommendations:   Per [facility] COVID visitation restrictions, RD not able to enter the room.   1. Continue with Regular diet as tolerated (no need for diet change, D/C is soon)  2. Weekly weights  3. Monitor lipid labs (Cholesterol, LDL high; HDL low)    Monitor:  1. % PO intake  2. Diet tolerance  3. Weight changes  4. Labs-replete prn  5. Skin integrity  6. Bowel function      Subjective:  Per [facility] COVID visitation restrictions, RD not able to enter the room.   Did a phone interview. D/C is today. Doing good. Intake is ok. Was on special O med meals once daily at home. Was practicing sugar free, dairy free, gluten-free for a while. EMR showed pt ws around 170, 180s years ago, intentionally lost wt. Says her goal weight is 152 lbs. Wants to become naturapath. Was planning on taking courses and was diagnosed with Covid.     Clinical Course: Patient is a 66 y.o. female who was admitted on 8/20/2022 with a diagnosis of Cellulitis of buttock [L03.317].     Nutrition Focused Physical Exam: Couldn't see pt.  Per [facility] COVID visitation restrictions, RD not able to enter the room.          Dietary Orders   (From admission, onward)             Start     Ordered    08/21/22 0907  Adult Diet Regular; RD/LDN may adjust order  Diet effective now        References:    IDDSI Diet reference   Question Answer Comment   Diet Texture Regular    Delegation of Authority. Diet orders written by PA/CRAiyana may not be adjusted by RD/LDNs. RD/LDN may adjust order        08/21/22 0906              Weights (last 7 days)     Date/Time Weight    08/21/22 0540 74.4 kg (164 lb)    08/21/22 05:28:06 74.4 kg (164 lb)    08/20/22 2042 74.8 kg (165 lb)        Wt Readings from Last 3 Encounters:   08/21/22 74.4 kg (164 lb)   07/18/22 75.8 kg (167 lb)   03/23/22 78 kg (172 lb)       Reason for Assessment  Reason For Assessment: identified at risk by screening criteria  "(LOS)  Diagnosis:  (absces of right buttock)    [facility] Nutrition Screen Tool  Has patient lost weight without trying?: 0-->No  Has patient been eating poorly due to decreased appetite?: 0-->No  MST Nutrition Screen Score: 0    Nutrition/Diet History  Typical Food/Fluid Intake: diet-O med once daily; no snacks  Diet Prior to Admission: O med; gluten-free,  Appetite Prior to Admission: Good-50-75%  Vitamin/Mineral/Herbal Supplements: PTA: natural vitamins, Vit D3  Food Allergies: fish/shellfish, other (see comments) (shellfish, honey; also follows gluten-free)  Factors Affecting Nutritional Intake:  (pt was on specific diet at home (O med?))    Physical Findings  Overall Physical Appearance: overweight  Gastrointestinal: constipation (LBM 8/23)  Last Bowel Movement: 08/23/22  Skin:  (wound rash-L lower/upper horacic spine ,3 days; no edema)    Last Bowel Movement: 08/23/22    Nutrition Order  Nutrition Order: meets nutritional requirements  Nutrition Order Comments: regular    Anthropometrics  Height: 165.1 cm (5' 5\")           Current Weight  Weight Method: Bed scale  Weight: 74.4 kg (164 lb)    Ideal Body Weight (IBW)  Ideal Body Weight (IBW) (kg): 57.29  % Ideal Body Weight: 129.85    Usual Body Weight (UBW)  Usual Body Weight: 74.8 kg (165 lb)  Body Mass Index (BMI)  BMI (Calculated): 27.3  BMI Assessment: BMI 25-29.9: overweight                        Labs/Procedures/Meds  Lab Results Reviewed: reviewed  Lab Results Comments: 8/25: Crt 0.5L, Cholesterol 223H, HDL 47L, LDL 152H    Results from last 7 days   Lab Units 08/24/22  0409 08/23/22  0537 08/22/22  1044   SODIUM mEQ/L 139 138 136   POTASSIUM mEQ/L 3.8 4.0 3.9   CHLORIDE mEQ/L 105 106 103   CO2 mEQ/L 28 26 28   BUN mg/dL 10 10 10   CREATININE mg/dL 0.5* 0.6 0.7   GLUCOSE mg/dL 89 93 93   CALCIUM mg/dL 9.0 9.0 8.9      Results from last 7 days   Lab Units 08/24/22  0409 08/20/22  2348   ALK PHOS IU/L 57 71   BILIRUBIN TOTAL mg/dL 0.6 0.9   ALBUMIN g/dL 3.4 3.8 "   ALT IU/L 23 23   AST IU/L 18 23          No results found for: HGBA1C  Lab Results   Component Value Date    [id number] 667 09/29/2021     Lab Results   Component Value Date    CALCIUM 9.0 08/24/2022     Results from last 7 days   Lab Units 08/24/22  0409 08/23/22  0537 08/22/22  1044   WBC K/uL 4.68 6.06 7.14   HEMOGLOBIN g/dL 12.6 13.0 12.2   HEMATOCRIT % 38.4 39.0 36.5   PLATELETS K/uL 272 258 206                           Medications  Pertinent Medications Reviewed: reviewed  Pertinent Medications Comments: Nicoderm CQ patch, Miralax, Senokot   clotrimazole-betamethasone   Topical BID    nicotine  1 patch transdermal Daily    polyethylene glycol  17 g oral Daily    senna  1 tablet oral BID    vancomycin  1,250 mg intravenous q12h INT         Nutritional Needs Met?: Yes  Nutrition Status Classification: Mild nutritional compromise (L1)      Date: 08/26/22  Signature: Abby Means RD, SANDRA

## 2022-08-26 NOTE — PROGRESS NOTES
"Infectious Disease Progress Note    Patient Name: Adeola Rees  MR#: [id number]  : 1955  Admission Date: 2022  Date: 22   Time: 12:37 PM   Author: RAI Hinojosa    Major Events:     Antibiotics:    Anti-infectives (From admission, onward)    Start     Dose/Rate Route Frequency Ordered Stop    22 1145  clotrimazole-betamethasone (LOTRISONE) 1-0.05 % cream          Topical 2 times daily 22 1046      22 1130  vancomycin 1.25 gram/250 mL IVPB in NSS        \"And\" Linked Group Details    1,250 mg  166.7 mL/hr over 90 Minutes intravenous Every 12 hours interval 22 0404            Subjective   Patient states her buttocks is hurting but overall feels a lot better    Review of Systems    Pertinent items are noted in HPI.    Objective     Vital Signs:    Patient Vitals for the past 72 hrs:   BP Temp Temp src Pulse Resp SpO2   22 0723 134/62 36.6 °C (97.9 °F) Oral 69 16 100 %   22 0000 (!) 172/80 37 °C (98.6 °F) Oral 83 18 97 %   22 1933 124/61 36.8 °C (98.2 °F) Oral 86 18 100 %   22 1648 (!) 159/91 36.7 °C (98.1 °F) Oral (!) 110 -- 100 %   22 0818 (!) 155/63 36.7 °C (98.1 °F) Oral 84 -- 100 %   22 0000 (!) 145/80 36.7 °C (98 °F) Oral -- 18 100 %   22 1900 (!) 150/81 36.6 °C (97.9 °F) -- -- 18 100 %   22 1423 136/71 36.6 °C (97.9 °F) Oral -- 18 99 %   22 0855 132/80 36.5 °C (97.7 °F) Oral -- 18 99 %   22 2300 (!) 146/72 36.8 °C (98.2 °F) Oral 80 18 99 %   22 1900 (!) 142/77 36.6 °C (97.9 °F) Oral 85 18 98 %   22 1500 104/65 36.8 °C (98.2 °F) Oral -- 18 97 %       Temp (72hrs), Av.7 °C (98.1 °F), Min:36.5 °C (97.7 °F), Max:37 °C (98.6 °F)      Physical Exam:    Visit Vitals  /62 (BP Location: Right upper arm, Patient Position: Lying)   Pulse 69   Temp 36.6 °C (97.9 °F) (Oral)   Resp 16   Ht 1.651 m (5' 5\")   Wt 74.4 kg (164 lb)   LMP  (LMP Unknown)   SpO2 100%   Breastfeeding No   BMI " 27.29 kg/m²     General appearance: alert, appears stated age, cooperative and no distress  Lungs: nonlabored, no cough observed  Extremities: no edema, normal ROM BLE  Skin: faint erythema of the right gluteal area, no drainage or surrounding fluctuance, induration improved, mild tenderness,   Neurologic: Mental status: Alert, oriented, thought content appropriate    Lines, Drains, Airways, Wounds:  Peripheral IV (Adult) 08/23/22 Left;Posterior Hand (Active)   Number of days: 3       Rash 08/22/22 1850 Left lower thoracic spine (Active)   Number of days: 4       Rash 08/22/22 1850 Left upper thoracic spine (Active)   Number of days: 4       Labs:    CBC Results       08/24/22 08/23/22 08/22/22     0409 0537 1044    WBC 4.68 6.06 7.14    RBC 4.16 4.20 3.89    HGB 12.6 13.0 12.2    HCT 38.4 39.0 36.5    MCV 92.3 92.9 93.8    MCH 30.3 31.0 31.4    MCHC 32.8 33.3 33.4     258 206      BMP Results       08/24/22 08/23/22 08/22/22     0409 0537 1044     138 136    K 3.8 4.0 3.9    Cl 105 106 103    CO2 28 26 28    Glucose 89 93 93    BUN 10 10 10    Creatinine 0.5 0.6 0.7    Calcium 9.0 9.0 8.9    Anion Gap 6 6 5    EGFR >60.0 >60.0 >60.0      PT/PTT Results    No lab values to display.     UA Results       12/19/21     0338    Color Yellow    Clarity Clear    Glucose Negative    Bilirubin Negative    Ketones Negative    Sp Grav <=1.005    Blood Negative    Ph 7.0    Protein Negative    Urobilinogen 0.2    Nitrite Negative    Leuk Est +1    WBC 0 TO 3    RBC 0 TO 4    Bacteria None Seen         Comment for Blood at 0338 on 12/19/21: The sensitivity of the occult blood test is equivalent to approximately 4 intact RBC/HPF.      Lactate Results    No lab values to display.         Microbiology Results     Procedure Component Value Units Date/Time    SARS-CoV-2 (COVID-19), PCR Nasopharynx [[id number]]  (Normal) Collected: 08/25/22 1212    Specimen: Nasopharyngeal Swab from Nasopharynx Updated: 08/25/22 1306     Narrative:      The following orders were created for panel order SARS-CoV-2 (COVID-19), PCR Nasopharynx.  Procedure                               Abnormality         Status                     ---------                               -----------         ------                     SARS-CoV-2 (COVID-19), P...[[id number]]  Normal              Final result                 Please view results for these tests on the individual orders.    SARS-CoV-2 (COVID-19), PCR Nasopharynx [[id number]]  (Normal) Collected: 08/25/22 1212    Specimen: Nasopharyngeal Swab from Nasopharynx Updated: 08/25/22 1306     SARS-CoV-2 (COVID-19) Negative    Narrative:      Testing performed using real-time PCR for detection of COVID-19. EUA approved validation studies performed on site.     Anaerobic Culture / Smear (includes Aerobic Culture) Buttock, Right [[id number]]  (Abnormal)  (Susceptibility) Collected: 08/23/22 1447    Specimen: Wound Swab from Buttock, Right Updated: 08/25/22 0847     Culture **Positive Culture**      3+ Staphylococcus aureus     Gram Stain Result No WBC Seen      No organisms seen    SARS-CoV-2 (COVID-19), PCR Nasopharynx [[id number]]  (Normal) Collected: 08/22/22 1502    Specimen: Nasopharyngeal Swab from Nasopharynx Updated: 08/22/22 1547    Narrative:      The following orders were created for panel order SARS-CoV-2 (COVID-19), PCR Nasopharynx.  Procedure                               Abnormality         Status                     ---------                               -----------         ------                     SARS-CoV-2 (COVID-19), P...[[id number]]  Normal              Final result                 Please view results for these tests on the individual orders.    SARS-CoV-2 (COVID-19), PCR Nasopharynx [[id number]]  (Normal) Collected: 08/22/22 1502    Specimen: Nasopharyngeal Swab from Nasopharynx Updated: 08/22/22 1547     SARS-CoV-2 (COVID-19) Negative    Narrative:      Testing performed using real-time PCR for  detection of COVID-19. EUA approved validation studies performed on site.     SARS-CoV-2 (COVID-19), PCR Nasopharynx [[id number]]  (Normal) Collected: 08/20/22 2351    Specimen: Nasopharyngeal Swab from Nasopharynx Updated: 08/21/22 0028    Narrative:      The following orders were created for panel order SARS-CoV-2 (COVID-19), PCR Nasopharynx.  Procedure                               Abnormality         Status                     ---------                               -----------         ------                     SARS-CoV-2 (COVID-19), P...[[id number]]  Normal              Final result                 Please view results for these tests on the individual orders.    SARS-CoV-2 (COVID-19), PCR Nasopharynx [[id number]]  (Normal) Collected: 08/20/22 2351    Specimen: Nasopharyngeal Swab from Nasopharynx Updated: 08/21/22 0028     SARS-CoV-2 (COVID-19) Negative    Narrative:      Testing performed using real-time PCR for detection of COVID-19. EUA approved validation studies performed on site.     Blood Culture Blood, Venous [[id number]]  (Normal) Collected: 08/20/22 2348    Specimen: Blood, Venous Updated: 08/26/22 0401     Culture No growth at 120 hours    Blood Culture Blood, Venous [[id number]]  (Normal) Collected: 08/20/22 2348    Specimen: Blood, Venous Updated: 08/26/22 0401     Culture No growth at 120 hours          Pathology Results     ** No results found for the last 720 hours. **          Echo:         Imaging:    Radiology Imaging    XR ABDOMEN 1 VW    Narrative  CLINICAL HISTORY: Abdominal bloating.    --    Impression  Fecal matter in the colon.    COMMENT: AP radiographs of the abdomen and pelvis reveal air and fecal matter  within the colon.  There is no definite evidence of small bowel obstruction or  free intraperitoneal air.  Comparison is made to a CT scan dated 7/20/2015.      Assessment     Right gluteal cellulitis and abscess- improved  - surgery following, s/p bedside I&D on 8/22, no cultures  obtained at that time  - wound c/s obtained 8/23 with MSSA  - afebrile  - no leukocytosis  - CT reviewed  - blood c/s sterile      COVID 19 Exposure- on isolation, SARS CoV2 PCR negative on 8/20 and 8/22  - no hypoxia or cough     Allergy to penicillin caused hives, Ceftin caused local arm pain, Sulfonamides caused rash, ciprofloxacin caused joint pain, stats she cannot take doxycycline but not listed in her allergies, has not tried Zyvox in the past           Plan     Will discuss possible completion of antibiotic course with Dr Webb

## 2022-08-26 NOTE — PROGRESS NOTES
66-year-old female with right buttock abscess status post I&D on 8/22/2022  Pain much less  No new complaints      Vitals:    08/25/22 1648 08/25/22 1933 08/26/22 0000 08/26/22 0723   BP: (!) 159/91 124/61 (!) 172/80 134/62   BP Location: Right upper arm Right upper arm Right upper arm Right upper arm   Patient Position: Standing Lying Lying Lying   Pulse: (!) 110 86 83 69   Resp:  18 18 16   Temp: 36.7 °C (98.1 °F) 36.8 °C (98.2 °F) 37 °C (98.6 °F) 36.6 °C (97.9 °F)   TempSrc: Oral Oral Oral Oral   SpO2: 100% 100% 97% 100%   Weight:       Height:         I/O last 3 completed shifts:  In: 100 [IV Piggyback:100]  Out: -   Awake, alert, and oriented ×3  Decreasing induration, no fluctuance, not much in way of erythema    Results from last 7 days   Lab Units 08/24/22  0409 08/23/22  0537 08/22/22  1044   WBC K/uL 4.68 6.06 7.14   HEMOGLOBIN g/dL 12.6 13.0 12.2   HEMATOCRIT % 38.4 39.0 36.5   PLATELETS K/uL 272 258 206     Results from last 7 days   Lab Units 08/24/22  0409 08/23/22  0537 08/22/22  1044 08/20/22  2348   SODIUM mEQ/L 139 138 136 136   POTASSIUM mEQ/L 3.8 4.0 3.9 4.0   CHLORIDE mEQ/L 105 106 103 102   CO2 mEQ/L 28 26 28 27   BUN mg/dL 10 10 10 10   CREATININE mg/dL 0.5* 0.6 0.7 0.7   CALCIUM mg/dL 9.0 9.0 8.9 9.2   ALBUMIN g/dL 3.4  --   --  3.8   BILIRUBIN TOTAL mg/dL 0.6  --   --  0.9   ALK PHOS IU/L 57  --   --  71   ALT IU/L 23  --   --  23   AST IU/L 18  --   --  23   GLUCOSE mg/dL 89 93 93 114*         Assessment/plan: Right buttock abscess  Sitz bath's/showers  No objection to discharge on oral antibiotic  Discussed with Dr. May  Would have her follow-up in our office in 6 weeks time  Patient aware the possibility that this may be a perirectal abscess  Nothing now to present care  Will sign off  Please call if needed

## 2022-08-26 NOTE — DISCHARGE INSTRUCTIONS
You were treated for a left buttock abscess.  You were treated with 7 days of IV antibiotics  You had bedside debridement done by surgery  Wound cultures grew MSSA  You do not require any further antibiotics    Continue Sitz Baths once a day for one more week.  Cover wound with clean dry gauze.    Please follow-up with Dr. Garces (surgeon) in 6 weeks for follow up  Follow up with Dr Lassiter within one week for hospital follow up.    Return to the hospital if you have fevers, increased redness or drainage from the wound.    Take all discharge paperwork with you to your follow up visits.

## 2022-08-28 LAB
GRAM STN SPEC: ABNORMAL
GRAM STN SPEC: ABNORMAL
MICROORGANISM SPEC CULT: ABNORMAL

## 2022-08-29 ENCOUNTER — PATIENT OUTREACH (OUTPATIENT)
Dept: CASE MANAGEMENT | Facility: CLINIC | Age: 67
End: 2022-08-29
Payer: COMMERCIAL

## 2022-08-29 ASSESSMENT — ENCOUNTER SYMPTOMS
ABDOMINAL PAIN: 1
RESPIRATORY NEGATIVE: 1
BACK PAIN: 1
CARDIOVASCULAR NEGATIVE: 1

## 2022-08-29 NOTE — PROGRESS NOTES
NAME: Adeola Rees    MRN: [id number]    YOB: 1955    Event Review:    Initial TCM Patient Outreach Date: 08/29/22    Assessment completed with: Patient     Discharge Diagnosis: absess of right buttock    Patient readmitted in the last 30 days: No  Discharging Facility: [facility]  Date of Last Admission: 08/21/22  Date of Last Discharge: 08/26/22    Patient's perception of their health status since discharge: New symptoms unrealted to diagnosis (dizziness and confusion.)    HPI:  Spoke with patient following her discharge from  for stay 8/21-8/26.  Patient presented to the hospital with right buttock pain secondary to abscess.  Patient underneath bedside debridement.  Patient received IV vanco for wound cultures showing MSSA. Patient was discharged from the hospital following 7 day course of IV vanco.    Patient reports that the wound is improving; reviewed the AVS for instructions on SITZ bath 1x day for 1 week.  Patient reports that she has been suffering with dizziness, confusion and pain.  She feels back pain rated 6/10 and abdominal pain rated 4/10.  She is agreeable to PCP follow up visit; message reviewed from office; who will reach out to patient to schedule  Review of Systems   Respiratory: Negative.    Cardiovascular: Negative.    Gastrointestinal: Positive for abdominal pain.   Genitourinary: Positive for pelvic pain.   Musculoskeletal: Positive for back pain.   Medication Review:  Medication Review: Yes     Reported by: Patient  Any new medications prescribed at discharge?: Yes  Is the patient having any side effects they believe may be caused by any medication additions or changes?: No  New prescriptions filled?: Yes     Do you have enough of your regularly prescribed medications?: Yes     Medication adherence problem?: No  Was a medication discrepancy indentified?: No     Nursing Interventions: No intervention needed  Reconciled the current and discharge medications:  Yes  Reviewed AVS (Discharge Instructions)?: Yes   Acute Pain:  Acute pain: Yes  Limitation of routine activities due to acute pain?: yes  Location of acute pain: back pain 6/10, abdominal pain 4/10.  Chronic Pain:  Chronic pain: Yes  Location of chronic pain: back  Diet/Nutrition:  Type of Diet: Regular  Home Care Services:  Home Care Interventions: No intervention required   Post-Discharge Durable Medical Equipment::  Durable Medical Equipment: None  Oxygen Use: No  DME Interventions: No intervention required  Home Management:  Living Arrangement: Spouse  Support System:: Spouse  Any patient reported falls in the last 3 months?: No  Uatsdin or spiritual beliefs that impact treatment?: No  Appointment Scheduling:  PCP appointment scheduled: No  Patient Scheduling Dispositions: No availability on schedule/message sent to office to assist with scheduling   Follow-Ups:  Relevant Specialist Follow-ups: Dr. Rossi  Interventions/ Care Coordination:  Interventions/ Care Coordination: Encouraged patient to call PCP/Specialist  Reviewed signs/symptoms of worsening condition or complication that necessitate a call to the Physician's office.  Educated patient on access to care.  RN phone number given for future care management.    Brenda Carranza RN

## 2022-08-30 ENCOUNTER — TELEPHONE (OUTPATIENT)
Dept: PRIMARY CARE | Facility: CLINIC | Age: 67
End: 2022-08-30

## 2022-08-30 NOTE — TELEPHONE ENCOUNTER
[facility] Appointment Request   Provider: Dr. Jeffery  Appointment Type: HFU  Reason for Visit: Discharged from  8/26, in with infection on buttocks  Available Day and Time: any time  Best Contact Number: [phone]    The practice will reach out to schedule your appointment within the next 2 business days.

## 2022-08-31 ENCOUNTER — OFFICE VISIT (OUTPATIENT)
Dept: PRIMARY CARE | Facility: CLINIC | Age: 67
End: 2022-08-31
Payer: COMMERCIAL

## 2022-08-31 VITALS
BODY MASS INDEX: 27.12 KG/M2 | HEART RATE: 82 BPM | RESPIRATION RATE: 16 BRPM | SYSTOLIC BLOOD PRESSURE: 130 MMHG | OXYGEN SATURATION: 98 % | DIASTOLIC BLOOD PRESSURE: 80 MMHG | TEMPERATURE: 98.8 F | WEIGHT: 163 LBS

## 2022-08-31 DIAGNOSIS — L02.31 ABSCESS OF RIGHT BUTTOCK: Primary | ICD-10-CM

## 2022-08-31 PROCEDURE — 99496 TRANSJ CARE MGMT HIGH F2F 7D: CPT | Performed by: FAMILY MEDICINE

## 2022-08-31 PROCEDURE — 1111F DSCHRG MED/CURRENT MED MERGE: CPT | Performed by: FAMILY MEDICINE

## 2022-08-31 PROCEDURE — 3079F DIAST BP 80-89 MM HG: CPT | Performed by: FAMILY MEDICINE

## 2022-08-31 PROCEDURE — 3008F BODY MASS INDEX DOCD: CPT | Performed by: FAMILY MEDICINE

## 2022-08-31 PROCEDURE — 3075F SYST BP GE 130 - 139MM HG: CPT | Performed by: FAMILY MEDICINE

## 2022-09-02 ENCOUNTER — TELEPHONE (OUTPATIENT)
Dept: PRIMARY CARE | Facility: CLINIC | Age: 67
End: 2022-09-02
Payer: COMMERCIAL

## 2022-09-02 ENCOUNTER — APPOINTMENT (OUTPATIENT)
Dept: LAB | Age: 67
End: 2022-09-02
Attending: FAMILY MEDICINE
Payer: COMMERCIAL

## 2022-09-02 DIAGNOSIS — L02.31 CUTANEOUS ABSCESS OF BUTTOCK: ICD-10-CM

## 2022-09-02 PROCEDURE — 30099997 SPECIMEN PROCESSING

## 2022-09-02 NOTE — TELEPHONE ENCOUNTER
Pt calling as Dr. Jeffery was to reach out to Dr. Garces office on her behalf and pt still has not heard from them.   Pt calling to see if Dr. Jeffery had any update for her.   Pt can be reached at [phone]

## 2022-09-03 LAB
BUN SERPL-MCNC: 10 MG/DL (ref 7–25)
BUN/CREAT SERPL: NORMAL (CALC) (ref 6–22)
CALCIUM SERPL-MCNC: 9.6 MG/DL (ref 8.6–10.4)
CHLORIDE SERPL-SCNC: 106 MMOL/L (ref 98–110)
CO2 SERPL-SCNC: 28 MMOL/L (ref 20–32)
CREAT SERPL-MCNC: 0.72 MG/DL (ref 0.5–1.05)
EGFRCR SERPLBLD CKD-EPI 2021: 93 ML/MIN/1.73M2
GLUCOSE SERPL-MCNC: 88 MG/DL (ref 65–99)
POTASSIUM SERPL-SCNC: 4.7 MMOL/L (ref 3.5–5.3)
SODIUM SERPL-SCNC: 142 MMOL/L (ref 135–146)

## 2022-09-07 ENCOUNTER — OFFICE VISIT (OUTPATIENT)
Dept: SURGERY | Facility: CLINIC | Age: 67
End: 2022-09-07
Payer: COMMERCIAL

## 2022-09-07 VITALS — BODY MASS INDEX: 27.16 KG/M2 | WEIGHT: 163 LBS | HEIGHT: 65 IN

## 2022-09-07 DIAGNOSIS — Z09 POSTOP CHECK: Primary | ICD-10-CM

## 2022-09-07 PROCEDURE — 99024 POSTOP FOLLOW-UP VISIT: CPT | Performed by: SURGERY

## 2022-09-07 NOTE — LETTER
2022     Tamara Valdivia DO  [address]  [place] [address]    Patient: Adeola Rees  YOB: 1955  Date of Visit: 2022      Dear Dr. Valdivia:    Thank you for referring Adeola Rees to me for evaluation. Below are my notes for this consultation.    If you have questions, please do not hesitate to call me. I look forward to following your patient along with you.         Sincerely,        Freedom Garces MD        CC: Roxann Walsh, RRT  Freedom Garces MD  2022 10:57 AM  Signed    Patient ID: Adeola Rees                              : 1955  MRN: [id number]                                            Visit Date: 2022  Encounter Provider: Freedom Garces  Referring Provider: Ashok Valdivia*    Subjective   Chief Complaint: Status post incision and drainage of right buttock abscess 2022, follow-up exam      Adeola Rees is a 66 y.o. old female with a past medical history as noted below who was recently hospitalized with a right buttock abscess which was incised and drained on 2022.  She was to follow-up in 6 weeks time but comes in early as she was concerned.  She had a fever to 100.1.  She is not having much in the way of drainage.  She tells me is that she is not taking her antibiotics as she has a problem with them.  She is however doing her sits baths and showers.     Medications:   Current Outpatient Medications:     CALCIUM ORAL, Take by mouth. Natural Vitality Calm and Calcium 1 tsp BID, Disp: , Rfl:     cholecalciferol, vitamin D3, 5,000 unit (125 mcg) capsule, Take 5,000 Units by mouth daily., Disp: , Rfl:     clotrimazole-betamethasone (LOTRISONE) 1-0.05 % cream, Apply topically 2 (two) times a day., Disp: 45 g, Rfl: 1    cyanocobalamin, vitamin B-12, (VITAMIN B-12 ORAL), Take 5,000 mcg by mouth. Vitamin shoppe, Disp: , Rfl:     MAGNESIUM ORAL, Take by mouth., Disp: , Rfl:  "    nicotine polacrilex (COMMIT) 4 mg lozenge, Apply 4 mg to cheek See admin instr. q 5 hours PRN, Disp: , Rfl:     tretinoin (RETIN-A TOP), Apply topically., Disp: , Rfl:     Past Medical History:  has a past medical history of Cancer (CMS/HCC), Celiac disease, Chemical sensitivity, COPD (chronic obstructive pulmonary disease) (CMS/HCC), Depression, Environmental allergies, Female infertility, H/O gastroesophageal reflux (GERD), High cholesterol, History of bronchitis, History of sinusitis, History of skin cancer, History of traumatic head injury, Hypertension, Nasal sinus polyp, Osteoarthritis, Osteopenia, Psoriasis, and Sleep apnea.    Objective   Vitals:   Visit Vitals  Ht 1.651 m (5' 5\")   Wt 73.9 kg (163 lb)   LMP  (LMP Unknown)   BMI 27.12 kg/m²     Physical Exam     On exam her incision and drainage site is healing well.  There is still some residual induration and slight erythema but no fluctuance.    Assessment/plan: Stable postoperative course following incision and drainage of right buttock abscess.  I told her that we could not force her to take the antibiotics but this was not a levy decision on her part.  She is taking garlic and turmeric and I told her that this is not standard treatment for an abscess.  She does not need return but will do so if she has any further problems or questions.       Freedom Garces MD            "

## 2022-09-07 NOTE — PROGRESS NOTES
Patient ID: Adeola Rees                              : 1955  MRN: [id number]                                            Visit Date: 2022  Encounter Provider: Freedom Garces  Referring Provider: Ashok Valdivia*    Subjective   Chief Complaint: Status post incision and drainage of right buttock abscess 2022, follow-up exam      Adeola Rees is a 66 y.o. old female with a past medical history as noted below who was recently hospitalized with a right buttock abscess which was incised and drained on 2022.  She was to follow-up in 6 weeks time but comes in early as she was concerned.  She had a fever to 100.1.  She is not having much in the way of drainage.  She tells me is that she is not taking her antibiotics as she has a problem with them.  She is however doing her sits baths and showers.     Medications:   Current Outpatient Medications:     CALCIUM ORAL, Take by mouth. Natural Vitality Calm and Calcium 1 tsp BID, Disp: , Rfl:     cholecalciferol, vitamin D3, 5,000 unit (125 mcg) capsule, Take 5,000 Units by mouth daily., Disp: , Rfl:     clotrimazole-betamethasone (LOTRISONE) 1-0.05 % cream, Apply topically 2 (two) times a day., Disp: 45 g, Rfl: 1    cyanocobalamin, vitamin B-12, (VITAMIN B-12 ORAL), Take 5,000 mcg by mouth. Vitamin shoppe, Disp: , Rfl:     MAGNESIUM ORAL, Take by mouth., Disp: , Rfl:     nicotine polacrilex (COMMIT) 4 mg lozenge, Apply 4 mg to cheek See admin instr. q 5 hours PRN, Disp: , Rfl:     tretinoin (RETIN-A TOP), Apply topically., Disp: , Rfl:     Past Medical History:  has a past medical history of Cancer (CMS/HCC), Celiac disease, Chemical sensitivity, COPD (chronic obstructive pulmonary disease) (CMS/HCC), Depression, Environmental allergies, Female infertility, H/O gastroesophageal reflux (GERD), High cholesterol, History of bronchitis, History of sinusitis, History of skin cancer, History of traumatic head injury, Hypertension,  "Nasal sinus polyp, Osteoarthritis, Osteopenia, Psoriasis, and Sleep apnea.    Objective   Vitals:   Visit Vitals  Ht 1.651 m (5' 5\")   Wt 73.9 kg (163 lb)   LMP  (LMP Unknown)   BMI 27.12 kg/m²     Physical Exam     On exam her incision and drainage site is healing well.  There is still some residual induration and slight erythema but no fluctuance.    Assessment/plan: Stable postoperative course following incision and drainage of right buttock abscess.  I told her that we could not force her to take the antibiotics but this was not a levy decision on her part.  She is taking garlic and turmeric and I told her that this is not standard treatment for an abscess.  She does not need return but will do so if she has any further problems or questions.       Freedom Garces MD          "

## 2022-09-12 ENCOUNTER — OFFICE VISIT (OUTPATIENT)
Dept: PRIMARY CARE | Facility: CLINIC | Age: 67
End: 2022-09-12
Payer: COMMERCIAL

## 2022-09-12 VITALS
HEIGHT: 65 IN | BODY MASS INDEX: 27.49 KG/M2 | WEIGHT: 165 LBS | SYSTOLIC BLOOD PRESSURE: 128 MMHG | RESPIRATION RATE: 14 BRPM | DIASTOLIC BLOOD PRESSURE: 76 MMHG | TEMPERATURE: 98.2 F | OXYGEN SATURATION: 98 % | HEART RATE: 82 BPM

## 2022-09-12 DIAGNOSIS — L02.31 ABSCESS OF RIGHT BUTTOCK: ICD-10-CM

## 2022-09-12 DIAGNOSIS — R10.84 ABDOMINAL DISCOMFORT, GENERALIZED: Primary | ICD-10-CM

## 2022-09-12 DIAGNOSIS — Z88.9 HISTORY OF MULTIPLE ALLERGIES: ICD-10-CM

## 2022-09-12 PROCEDURE — 3078F DIAST BP <80 MM HG: CPT | Performed by: FAMILY MEDICINE

## 2022-09-12 PROCEDURE — 3074F SYST BP LT 130 MM HG: CPT | Performed by: FAMILY MEDICINE

## 2022-09-12 PROCEDURE — 3008F BODY MASS INDEX DOCD: CPT | Performed by: FAMILY MEDICINE

## 2022-09-12 PROCEDURE — 99214 OFFICE O/P EST MOD 30 MIN: CPT | Performed by: FAMILY MEDICINE

## 2022-09-12 NOTE — PROGRESS NOTES
Tamara Valdivia D.O.   [facility] in [address], [address]  [place] [address]  [phone]  Fax [phone]          History of Present Illness   Patient ID: Adeola Rees is a 66 y.o. female.    Subjective   Follow up eval    HPI  Pt states here for follow up  ---buttock area seen by Dr Garces  ---did not finish abx therapy continues on tumeric/otc-garlic preparation and sitz bathes.    Pt still w/complaints of increased abdominal pain/ discomfort  started on tea w/probiotc  ----ct of abdomen --> Not done----ct of pelvis completed and was Negative.    Due to see Dr Mooney  /this week for follow up  --ct of abdomen on hold at this   time due to --pt schedule    Feels may have allergy to food and would liek to see allergist   Past Medical/Surgical/Family/Social History     The following have been reviewed and updated as appropriate in this visit:   Meds  Problems           Past Medical History:   Diagnosis Date    Cancer (CMS/[facility])     skin cancer-basal    Celiac disease     Chemical sensitivity     COPD (chronic obstructive pulmonary disease) (CMS/[facility])     Depression     Environmental allergies     Female infertility     H/O gastroesophageal reflux (GERD)     High cholesterol     History of bronchitis     History of sinusitis     History of skin cancer     History of traumatic head injury     Hypertension     Nasal sinus polyp     Osteoarthritis     Osteopenia     Psoriasis     Sleep apnea        Past Surgical History:   Procedure Laterality Date    APPENDECTOMY      APPENDECTOMY  1970    COLONOSCOPY  07/2019    HYSTERECTOMY  1995    OOPHORECTOMY      TONSILLECTOMY      TONSILLECTOMY  1964    UPPER GASTROINTESTINAL ENDOSCOPY  07/2019    WISDOM TOOTH EXTRACTION  1976       Family History   Problem Relation Age of Onset    Hypertension Biological Mother     Obesity Biological Mother     Kidney disease Biological Mother     Hypertension  Biological Father     Obesity Biological Father     Heart disease Biological Father     Diabetes Biological Father     Arthritis Biological Father     Thyroid cancer Biological Sister     Cancer Biological Sister     ADD / ADHD Biological Sister     Hypertension Biological Brother     Eczema Biological Brother     Heart disease Maternal Grandmother     Hypertension Maternal Grandmother     Obesity Maternal Grandmother     Depression Maternal Grandmother     Hypertension Maternal Grandfather     Heart disease Maternal Grandfather     Stroke Maternal Grandfather     Dementia Paternal Grandmother     Diabetes Paternal Grandfather     Cancer Paternal Grandfather        Social History     Socioeconomic History    Marital status:      Spouse name: Cristopher     Number of children: 2    Years of education: Not on file    Highest education level: Not on file   Occupational History    Occupation: homemaker   Tobacco Use    Smoking status: Former Smoker     Packs/day: 1.00     Years: 48.00     Pack years: 48.00     Types: Cigarettes     Start date: 1968     Quit date: 2016     Years since quittin.8    Smokeless tobacco: Never Used    Tobacco comment: Uses lozenges   Vaping Use    Vaping Use: Never used   Substance and Sexual Activity    Alcohol use: Not Currently    Drug use: Never    Sexual activity: Defer   Other Topics Concern    Not on file   Social History Narrative    Lives with her , has two adopted children. (22).     Social Determinants of Health     Financial Resource Strain: Not on file   Food Insecurity: No Food Insecurity    Worried About Running Out of Food in the Last Year: Never true    Ran Out of Food in the Last Year: Never true   Transportation Needs: Not on file   Physical Activity: Not on file   Stress: Not on file   Social Connections: Not on file   Intimate Partner Violence: Not on file   Housing Stability: Not on file      Allergies and  Medications     Allergies   Allergen Reactions    Bee Sting [Bee Venom Protein (Honey Bee)] Anaphylaxis    Honey     Penicillins Anaphylaxis     Other reaction(s): Anaphylaxis    Shellfish Derived Hives    Sulfa (Sulfonamide Antibiotics) Anaphylaxis    Clindamycin     [address] Itching and Other (see comments)     swelling    Nickel Itching and Other (see comments)     swelling    Ceftin [Cefuroxime Axetil] Other (see comments)     Per patient bone pain    Ciprofibrate     Ciprofloxacin      Other reaction(s): Muscular pain         Current Outpatient Medications:     CALCIUM ORAL, Take by mouth. Natural Vitality Calm and Calcium 1 tsp BID, Disp: , Rfl:     cholecalciferol, vitamin D3, 5,000 unit (125 mcg) capsule, Take 5,000 Units by mouth daily., Disp: , Rfl:     clotrimazole-betamethasone (LOTRISONE) 1-0.05 % cream, Apply topically 2 (two) times a day., Disp: 45 g, Rfl: 1    cyanocobalamin, vitamin B-12, (VITAMIN B-12 ORAL), Take 5,000 mcg by mouth. Vitamin shoppe, Disp: , Rfl:     MAGNESIUM ORAL, Take by mouth., Disp: , Rfl:     nicotine polacrilex (COMMIT) 4 mg lozenge, Apply 4 mg to cheek See admin instr. q 5 hours PRN, Disp: , Rfl:     tretinoin (RETIN-A TOP), Apply topically., Disp: , Rfl:    Review of Systems       Review of Systems   Constitutional: Negative for activity change, appetite change, chills, diaphoresis, fatigue and unexpected weight change.   HENT: Negative for congestion, hearing loss, postnasal drip, sinus pressure, sinus pain and voice change.    Eyes: Negative for pain, discharge, redness, itching and visual disturbance.   Respiratory: Negative for cough, chest tightness, shortness of breath and wheezing.    Cardiovascular: Negative for chest pain, palpitations and leg swelling.   Gastrointestinal: Positive for abdominal pain.        Complains of abdominal pain off and on   Skin:        glute area without any induration noted, no redness ntoed      Physical Examination    "    Objective     Vitals:    09/12/22 1210   BP: 128/76   Pulse: 82   Resp: 14   Temp: 36.8 °C (98.2 °F)   SpO2: 98%       Vitals:    09/12/22 1210   BP: 128/76   BP Location: Left upper arm   Patient Position: Sitting   Pulse: 82   Resp: 14   Temp: 36.8 °C (98.2 °F)   TempSrc: Temporal   SpO2: 98%   Weight: 74.8 kg (165 lb)   Height: 1.651 m (5' 5\")       Wt Readings from Last 3 Encounters:   09/12/22 74.8 kg (165 lb)   09/07/22 73.9 kg (163 lb)   08/31/22 73.9 kg (163 lb)       Ht Readings from Last 3 Encounters:   09/12/22 1.651 m (5' 5\")   09/07/22 1.651 m (5' 5\")   08/21/22 1.651 m (5' 5\")       BP Readings from Last 3 Encounters:   09/12/22 128/76   08/31/22 130/80   08/26/22 134/62       Physical Exam  Vitals and nursing note reviewed.   Constitutional:       Appearance: Normal appearance. She is well-developed.   HENT:      Head: Normocephalic and atraumatic.      Right Ear: External ear normal.      Left Ear: External ear normal.      Nose: Nose normal.      Mouth/Throat:      Mouth: Mucous membranes are moist.      Pharynx: Oropharynx is clear.   Eyes:      Extraocular Movements: Extraocular movements intact.      Conjunctiva/sclera: Conjunctivae normal.      Pupils: Pupils are equal, round, and reactive to light.   Neck:      Thyroid: No thyromegaly.   Cardiovascular:      Rate and Rhythm: Normal rate and regular rhythm.      Heart sounds: Normal heart sounds.   Pulmonary:      Effort: Pulmonary effort is normal.      Breath sounds: Normal breath sounds.   Abdominal:      General: Abdomen is flat. There is no distension.      Palpations: Abdomen is soft. There is no mass.      Tenderness: There is no abdominal tenderness. There is no right CVA tenderness, left CVA tenderness, guarding or rebound.   Musculoskeletal:      Cervical back: Normal range of motion and neck supple.   Lymphadenopathy:      Cervical: No cervical adenopathy.   Skin:     Findings: No erythema or rash.   Neurological:      General: No " focal deficit present.      Mental Status: She is oriented to person, place, and time.        Laboratory Results     Lab Results   Component Value Date    WBC 4.68 08/24/2022    HGB 12.6 08/24/2022    HCT 38.4 08/24/2022    MCV 92.3 08/24/2022     08/24/2022          Chemistry        Component Value Date/Time     09/02/2022 0752     08/24/2022 0409    K 4.7 09/02/2022 0752    K 3.8 08/24/2022 0409     09/02/2022 0752     08/24/2022 0409    CO2 28 09/02/2022 0752    CO2 28 08/24/2022 0409    BUN 10 09/02/2022 0752    BUN 10 08/24/2022 0409    CREATININE 0.72 09/02/2022 0752    CREATININE 0.5 (L) 08/24/2022 0409        Component Value Date/Time    CALCIUM 9.6 09/02/2022 0752    CALCIUM 9.0 08/24/2022 0409    ALKPHOS 57 08/24/2022 0409    AST 18 08/24/2022 0409    ALT 23 08/24/2022 0409    BILITOT 0.6 08/24/2022 0409            Lab Results   Component Value Date    GLUCOSE 88 09/02/2022    CALCIUM 9.6 09/02/2022     09/02/2022    K 4.7 09/02/2022    CO2 28 09/02/2022     09/02/2022    BUN 10 09/02/2022    CREATININE 0.72 09/02/2022       Lab Results   Component Value Date    CHOL 223 (H) 09/12/2020    CHOL 234 (H) 05/29/2020    CHOL 222 (H) 11/20/2019     Lab Results   Component Value Date    HDL 47 (L) 09/12/2020    HDL 51 (L) 05/29/2020    HDL 48 (L) 11/20/2019     Lab Results   Component Value Date    LDLCALC 152 (H) 09/12/2020    LDLCALC 163 (H) 05/29/2020    LDLCALC 150 (H) 11/20/2019     Lab Results   Component Value Date    TRIG 118 09/12/2020    TRIG 99 05/29/2020    TRIG 122 11/20/2019     No results found for: CHOLHDL    Lab Results   Component Value Date    TSH 2.28 03/27/2022       No results found for: HGBA1C    No results found for: HEPCAB      There is no immunization history for the selected administration types on file for this patient.       Assessment and Plan       Assessment/Plan     Problem List Items Addressed This Visit        Nervous    Abdominal  discomfort, generalized - Primary    Current Assessment & Plan     May be related to IBS  --has order to complete imaging  ---now has probiotic to helpt w/symptoms    No additional treatment at this time.              Musculoskeletal    Abscess of right buttock    Current Assessment & Plan     Pt stopped all previous abx and using holistic remedy to improve area              Other    History of multiple allergies    Current Assessment & Plan     Feels most of her symptoms due to underlying allergies---will send for reval           Relevant Orders    Ambulatory referral to Allergy          No follow-ups on file.    Orders Placed This Encounter   Procedures    Ambulatory referral to Allergy     Standing Status:   Future     Standing Expiration Date:   3/12/2023     Referral Priority:   Routine     Referral Type:   Allergy, Asthma, and Immunology     Referral Reason:   Specialty Services Required     Referred to Provider:   VANESSA Rainey MD     Requested Specialty:   Allergy     Number of Visits Requested:   10          Tamara Valdivia DO  9/24/2022

## 2022-09-22 ENCOUNTER — TRANSCRIBE ORDERS (OUTPATIENT)
Dept: SCHEDULING | Age: 67
End: 2022-09-22

## 2022-09-22 DIAGNOSIS — Z12.31 ENCOUNTER FOR SCREENING MAMMOGRAM FOR MALIGNANT NEOPLASM OF BREAST: Primary | ICD-10-CM

## 2022-09-22 DIAGNOSIS — Z80.3 FAMILY HISTORY OF MALIGNANT NEOPLASM OF BREAST: ICD-10-CM

## 2022-09-23 ENCOUNTER — HOSPITAL ENCOUNTER (OUTPATIENT)
Dept: RADIOLOGY | Age: 67
Discharge: HOME | End: 2022-09-23
Attending: SPECIALIST
Payer: COMMERCIAL

## 2022-09-23 DIAGNOSIS — Z80.3 FAMILY HISTORY OF MALIGNANT NEOPLASM OF BREAST: ICD-10-CM

## 2022-09-23 DIAGNOSIS — Z12.31 ENCOUNTER FOR SCREENING MAMMOGRAM FOR MALIGNANT NEOPLASM OF BREAST: ICD-10-CM

## 2022-09-23 PROCEDURE — 77063 BREAST TOMOSYNTHESIS BI: CPT

## 2022-09-24 PROBLEM — R10.84 ABDOMINAL DISCOMFORT, GENERALIZED: Status: ACTIVE | Noted: 2022-09-24

## 2022-09-24 PROBLEM — Z88.9 HISTORY OF MULTIPLE ALLERGIES: Status: ACTIVE | Noted: 2022-09-24

## 2022-09-24 ASSESSMENT — ENCOUNTER SYMPTOMS
UNEXPECTED WEIGHT CHANGE: 0
EYE ITCHING: 0
EYE PAIN: 0
ACTIVITY CHANGE: 0
CHEST TIGHTNESS: 0
FATIGUE: 0
VOICE CHANGE: 0
EYE REDNESS: 0
WHEEZING: 0
PALPITATIONS: 0
CHILLS: 0
COUGH: 0
SHORTNESS OF BREATH: 0
APPETITE CHANGE: 0
DIAPHORESIS: 0
EYE DISCHARGE: 0
SINUS PAIN: 0
ABDOMINAL PAIN: 1
SINUS PRESSURE: 0

## 2022-09-24 NOTE — ASSESSMENT & PLAN NOTE
May be related to IBS  --has order to complete imaging  ---now has probiotic to helpt w/symptoms    No additional treatment at this time.

## 2022-09-27 ENCOUNTER — APPOINTMENT (OUTPATIENT)
Dept: URBAN - METROPOLITAN AREA CLINIC 203 | Age: 67
Setting detail: DERMATOLOGY
End: 2022-09-28

## 2022-09-27 DIAGNOSIS — L30.4 ERYTHEMA INTERTRIGO: ICD-10-CM

## 2022-09-27 DIAGNOSIS — L57.8 OTHER SKIN CHANGES DUE TO CHRONIC EXPOSURE TO NONIONIZING RADIATION: ICD-10-CM

## 2022-09-27 DIAGNOSIS — B95.61 METHICILLIN SUSCEPTIBLE STAPHYLOCOCCUS AUREUS INFECTION AS THE CAUSE OF DISEASES CLASSIFIED ELSEWHERE: ICD-10-CM

## 2022-09-27 DIAGNOSIS — B07.8 OTHER VIRAL WARTS: ICD-10-CM

## 2022-09-27 DIAGNOSIS — Z85.828 PERSONAL HISTORY OF OTHER MALIGNANT NEOPLASM OF SKIN: ICD-10-CM

## 2022-09-27 DIAGNOSIS — Z11.52 ENCOUNTER FOR SCREENING FOR COVID-19: ICD-10-CM

## 2022-09-27 DIAGNOSIS — L82.1 OTHER SEBORRHEIC KERATOSIS: ICD-10-CM

## 2022-09-27 PROCEDURE — 99213 OFFICE O/P EST LOW 20 MIN: CPT | Mod: 25

## 2022-09-27 PROCEDURE — OTHER COUNSELING: OTHER

## 2022-09-27 PROCEDURE — 17110 DESTRUCT B9 LESION 1-14: CPT

## 2022-09-27 PROCEDURE — OTHER SCREENING FOR COVID-19: OTHER

## 2022-09-27 PROCEDURE — OTHER SUNSCREEN RECOMMENDATIONS: OTHER

## 2022-09-27 PROCEDURE — OTHER PRESCRIPTION: OTHER

## 2022-09-27 PROCEDURE — OTHER LIQUID NITROGEN: OTHER

## 2022-09-27 PROCEDURE — OTHER PRESCRIPTION MEDICATION MANAGEMENT: OTHER

## 2022-09-27 PROCEDURE — OTHER REASSURANCE: OTHER

## 2022-09-27 RX ORDER — NYSTATIN AND TRIAMCINOLONE ACETONIDE 100000; 1 [USP'U]/G; MG/G
CREAM TOPICAL
Qty: 30 | Refills: 5 | Status: ERX | COMMUNITY
Start: 2022-09-27

## 2022-09-27 ASSESSMENT — LOCATION DETAILED DESCRIPTION DERM
LOCATION DETAILED: RIGHT CENTRAL LATERAL NECK
LOCATION DETAILED: LEFT MEDIAL BREAST 11-12:00 REGION
LOCATION DETAILED: LEFT INFRAMAMMARY CREASE (INNER QUADRANT)
LOCATION DETAILED: RIGHT INFRAMAMMARY CREASE (INNER QUADRANT)
LOCATION DETAILED: RIGHT SUPERIOR UPPER BACK
LOCATION DETAILED: RIGHT BUTTOCK
LOCATION DETAILED: RIGHT DISTAL POSTERIOR THIGH
LOCATION DETAILED: EPIGASTRIC SKIN
LOCATION DETAILED: LEFT MEDIAL BREAST 10-11:00 REGION
LOCATION DETAILED: LEFT CENTRAL BUCCAL CHEEK
LOCATION DETAILED: RIGHT GLUTEAL CREASE
LOCATION DETAILED: LEFT RIB CAGE
LOCATION DETAILED: RIGHT RIB CAGE

## 2022-09-27 ASSESSMENT — LOCATION SIMPLE DESCRIPTION DERM
LOCATION SIMPLE: LEFT BREAST
LOCATION SIMPLE: NECK
LOCATION SIMPLE: RIGHT BUTTOCK
LOCATION SIMPLE: LEFT CHEEK
LOCATION SIMPLE: RIGHT POSTERIOR THIGH
LOCATION SIMPLE: RIGHT GLUTEAL CREASE
LOCATION SIMPLE: RIGHT UPPER BACK
LOCATION SIMPLE: RIGHT BREAST
LOCATION SIMPLE: ABDOMEN

## 2022-09-27 ASSESSMENT — LOCATION ZONE DERM
LOCATION ZONE: NECK
LOCATION ZONE: TRUNK
LOCATION ZONE: FACE
LOCATION ZONE: LEG

## 2022-09-27 ASSESSMENT — PAIN INTENSITY VAS: HOW INTENSE IS YOUR PAIN 0 BEING NO PAIN, 10 BEING THE MOST SEVERE PAIN POSSIBLE?: NO PAIN

## 2022-09-27 NOTE — PROCEDURE: LIQUID NITROGEN
Show Aperture Variable?: Yes
Add 52 Modifier (Optional): no
Medical Necessity Information: It is in your best interest to select a reason for this procedure from the list below. All of these items fulfill various CMS LCD requirements except the new and changing color options.
Post-Care Instructions: I reviewed with the patient in detail post-care instructions. Patient is to wear sunprotection, and avoid picking at any of the treated lesions. Pt may apply Vaseline to crusted or scabbing areas.
Medical Necessity Clause: This procedure was medically necessary because the lesions that were treated were:
Spray Paint Text: The liquid nitrogen was applied to the skin utilizing a spray paint frosting technique.
Consent: The patient's consent was obtained including but not limited to risks of crusting, scabbing, blistering, scarring, darker or lighter pigmentary change, recurrence, incomplete removal and infection.
Detail Level: Detailed

## 2022-09-27 NOTE — PROCEDURE: PRESCRIPTION MEDICATION MANAGEMENT
Plan: Pt was hospitalized from [facility] and had reaction to vancomycin. Still having pain Plan: Pt was hospitalized from [facility] and had reaction to vancomycin. Still having pain

## 2022-09-30 ENCOUNTER — HOSPITAL ENCOUNTER (OUTPATIENT)
Dept: RADIOLOGY | Age: 67
Discharge: HOME | End: 2022-09-30
Attending: RADIOLOGY
Payer: COMMERCIAL

## 2022-09-30 DIAGNOSIS — N64.89 BREAST ASYMMETRY: ICD-10-CM

## 2022-09-30 PROCEDURE — 77065 DX MAMMO INCL CAD UNI: CPT | Mod: RT

## 2022-10-19 ENCOUNTER — RX ONLY (RX ONLY)
Age: 67
End: 2022-10-19

## 2022-10-19 RX ORDER — TAZAROTENE 0.1 MG/G
.1% CREAM CUTANEOUS QD
Qty: 30 | Refills: 5 | Status: ERX

## 2022-10-21 ENCOUNTER — TELEPHONE (OUTPATIENT)
Dept: PRIMARY CARE | Facility: CLINIC | Age: 67
End: 2022-10-21
Payer: COMMERCIAL

## 2022-10-24 NOTE — TELEPHONE ENCOUNTER
Precert was approved.     Authorization #[id number]  Valid 10/24/22 to 04/22/23  Elma Hassan/CARLINE

## 2022-10-25 ENCOUNTER — TELEPHONE (OUTPATIENT)
Dept: PULMONOLOGY | Facility: HOSPITAL | Age: 67
End: 2022-10-25
Payer: COMMERCIAL

## 2022-10-25 ENCOUNTER — TRANSCRIBE ORDERS (OUTPATIENT)
Dept: SCHEDULING | Age: 67
End: 2022-10-25

## 2022-10-25 VITALS — WEIGHT: 165 LBS | HEIGHT: 65 IN | BODY MASS INDEX: 27.49 KG/M2

## 2022-10-25 NOTE — TELEPHONE ENCOUNTER
Adeola called to schedule her annual lung screening but the insurance denied the screened.  The order is for a ct lung nodule follow up which is not a screening but a diagnostic study using a low dose protocol.

## 2022-10-27 ENCOUNTER — HOSPITAL ENCOUNTER (OUTPATIENT)
Dept: RADIOLOGY | Age: 67
Discharge: HOME | End: 2022-10-27
Attending: FAMILY MEDICINE
Payer: COMMERCIAL

## 2022-10-27 DIAGNOSIS — Z87.891 FORMER SMOKER: ICD-10-CM

## 2022-10-27 DIAGNOSIS — J44.9 CHRONIC OBSTRUCTIVE PULMONARY DISEASE, UNSPECIFIED COPD TYPE (CMS/HCC): ICD-10-CM

## 2022-10-27 PROCEDURE — G1004 CDSM NDSC: HCPCS

## 2022-10-27 PROCEDURE — 71250 CT THORAX DX C-: CPT | Mod: MF

## 2022-10-31 NOTE — RESULT ENCOUNTER NOTE
Ok to notify Adeola that her ct of lung looks ok,   has 2 nodules  and due to smoking history will need a follow up scan again in 1 year as recheck.     Nodules look ok  1 at 5mm other at 3mm.

## 2023-01-05 ENCOUNTER — TRANSCRIBE ORDERS (OUTPATIENT)
Dept: REGISTRATION | Age: 68
End: 2023-01-05

## 2023-01-05 ENCOUNTER — APPOINTMENT (OUTPATIENT)
Dept: LAB | Age: 68
End: 2023-01-05
Attending: INTERNAL MEDICINE
Payer: MEDICARE

## 2023-01-05 DIAGNOSIS — R14.0 ABDOMINAL DISTENSION (GASEOUS): Primary | ICD-10-CM

## 2023-01-05 DIAGNOSIS — K90.0 CELIAC DISEASE: ICD-10-CM

## 2023-01-05 DIAGNOSIS — F90.1 ATTENTION-DEFICIT HYPERACTIVITY DISORDER, PREDOMINANTLY HYPERACTIVE TYPE: ICD-10-CM

## 2023-01-05 DIAGNOSIS — K59.00 CONSTIPATION: ICD-10-CM

## 2023-01-05 DIAGNOSIS — F41.9 ANXIETY DISORDER, UNSPECIFIED: ICD-10-CM

## 2023-01-05 DIAGNOSIS — I10 ESSENTIAL (PRIMARY) HYPERTENSION: ICD-10-CM

## 2023-01-05 DIAGNOSIS — R14.0 ABDOMINAL DISTENSION (GASEOUS): ICD-10-CM

## 2023-01-05 DIAGNOSIS — E63.9 NUTRITIONAL DEFICIENCY, UNSPECIFIED: ICD-10-CM

## 2023-01-05 DIAGNOSIS — K21.9 GASTRO-ESOPHAGEAL REFLUX DISEASE WITHOUT ESOPHAGITIS: ICD-10-CM

## 2023-01-05 LAB
BASOPHILS # BLD: 0.06 K/UL (ref 0.01–0.1)
BASOPHILS NFR BLD: 1.1 %
DIFFERENTIAL METHOD BLD: ABNORMAL
EOSINOPHIL # BLD: 0.21 K/UL (ref 0.04–0.36)
EOSINOPHIL NFR BLD: 3.8 %
ERYTHROCYTE [DISTWIDTH] IN BLOOD BY AUTOMATED COUNT: 12.2 % (ref 11.7–14.4)
HCT VFR BLDCO AUTO: 45.8 % (ref 35–45)
HGB BLD-MCNC: 15 G/DL (ref 11.8–15.7)
IMM GRANULOCYTES # BLD AUTO: 0.04 K/UL (ref 0–0.08)
IMM GRANULOCYTES NFR BLD AUTO: 0.7 %
LYMPHOCYTES # BLD: 1.85 K/UL (ref 1.2–3.5)
LYMPHOCYTES NFR BLD: 33.2 %
MCH RBC QN AUTO: 31.4 PG (ref 28–33.2)
MCHC RBC AUTO-ENTMCNC: 32.8 G/DL (ref 32.2–35.5)
MCV RBC AUTO: 96 FL (ref 83–98)
MONOCYTES # BLD: 0.41 K/UL (ref 0.28–0.8)
MONOCYTES NFR BLD: 7.3 %
NEUTROPHILS # BLD: 3.01 K/UL (ref 1.7–7)
NEUTS SEG NFR BLD: 53.9 %
NRBC BLD-RTO: 0 %
PDW BLD AUTO: 9.5 FL (ref 9.4–12.3)
PLATELET # BLD AUTO: 252 K/UL (ref 150–369)
RBC # BLD AUTO: 4.77 M/UL (ref 3.93–5.22)
WBC # BLD AUTO: 5.58 K/UL (ref 3.8–10.5)

## 2023-01-05 PROCEDURE — 82542 COL CHROMOTOGRAPHY QUAL/QUAN: CPT

## 2023-01-05 PROCEDURE — 84425 ASSAY OF VITAMIN B-1: CPT

## 2023-01-05 PROCEDURE — 83090 ASSAY OF HOMOCYSTEINE: CPT

## 2023-01-05 PROCEDURE — 84630 ASSAY OF ZINC: CPT

## 2023-01-05 PROCEDURE — 84439 ASSAY OF FREE THYROXINE: CPT

## 2023-01-05 PROCEDURE — 81291 MTHFR GENE: CPT

## 2023-01-05 PROCEDURE — 84443 ASSAY THYROID STIM HORMONE: CPT

## 2023-01-05 PROCEDURE — 86038 ANTINUCLEAR ANTIBODIES: CPT

## 2023-01-05 PROCEDURE — 82542 COL CHROMOTOGRAPHY QUAL/QUAN: CPT | Mod: 59

## 2023-01-05 PROCEDURE — 82977 ASSAY OF GGT: CPT | Mod: GA

## 2023-01-05 PROCEDURE — 82607 VITAMIN B-12: CPT

## 2023-01-05 PROCEDURE — 84252 ASSAY OF VITAMIN B-2: CPT

## 2023-01-05 PROCEDURE — 83540 ASSAY OF IRON: CPT

## 2023-01-05 PROCEDURE — 80053 COMPREHEN METABOLIC PANEL: CPT

## 2023-01-05 PROCEDURE — 82525 ASSAY OF COPPER: CPT

## 2023-01-05 PROCEDURE — 84481 FREE ASSAY (FT-3): CPT

## 2023-01-05 PROCEDURE — 82306 VITAMIN D 25 HYDROXY: CPT

## 2023-01-05 PROCEDURE — 82728 ASSAY OF FERRITIN: CPT

## 2023-01-05 PROCEDURE — 86140 C-REACTIVE PROTEIN: CPT

## 2023-01-05 PROCEDURE — 83525 ASSAY OF INSULIN: CPT

## 2023-01-05 PROCEDURE — 36415 COLL VENOUS BLD VENIPUNCTURE: CPT

## 2023-01-05 PROCEDURE — 85025 COMPLETE CBC W/AUTO DIFF WBC: CPT

## 2023-01-05 PROCEDURE — 84255 ASSAY OF SELENIUM: CPT

## 2023-01-05 PROCEDURE — 83735 ASSAY OF MAGNESIUM: CPT

## 2023-01-05 PROCEDURE — 84207 ASSAY OF VITAMIN B-6: CPT | Mod: GA

## 2023-01-05 PROCEDURE — 83921 ORGANIC ACID SINGLE QUANT: CPT

## 2023-01-06 LAB
25(OH)D3 SERPL-MCNC: 24 NG/ML (ref 30–100)
ALBUMIN SERPL-MCNC: 4 G/DL (ref 3.4–5)
ALP SERPL-CCNC: 74 IU/L (ref 35–126)
ALT SERPL-CCNC: 27 IU/L (ref 11–54)
ANA SER QL IA: NEGATIVE
ANION GAP SERPL CALC-SCNC: 13 MEQ/L (ref 3–15)
AST SERPL-CCNC: 24 IU/L (ref 15–41)
BILIRUB SERPL-MCNC: 1.2 MG/DL (ref 0.3–1.2)
BUN SERPL-MCNC: 10 MG/DL (ref 8–20)
CALCIUM SERPL-MCNC: 9.3 MG/DL (ref 8.9–10.3)
CHLORIDE SERPL-SCNC: 98 MEQ/L (ref 98–109)
CO2 SERPL-SCNC: 23 MEQ/L (ref 22–32)
CREAT SERPL-MCNC: 0.7 MG/DL (ref 0.6–1.1)
CRP SERPL-MCNC: <6 MG/L
FERRITIN SERPL-MCNC: 130 NG/ML (ref 11–250)
GFR SERPL CREATININE-BSD FRML MDRD: >60 ML/MIN/1.73M*2
GGT SERPL-CCNC: 14 IU/L (ref 7–50)
GLUCOSE SERPL-MCNC: 73 MG/DL (ref 70–99)
HCYS SERPL-SCNC: 11 UMOL/L (ref 4.5–15)
INSULIN SERPL-ACNC: 5.9 MU/L (ref 1.9–23)
IRON SERPL-MCNC: 124 UG/DL (ref 35–150)
POTASSIUM SERPL-SCNC: 4.6 MEQ/L (ref 3.6–5.1)
PROT SERPL-MCNC: 6.6 G/DL (ref 6–8.2)
SODIUM SERPL-SCNC: 134 MEQ/L (ref 136–144)
T3FREE SERPL-MCNC: 3.39 PG/ML (ref 2.1–3.7)
T4 FREE SERPL-MCNC: 0.99 NG/DL (ref 0.58–1.64)
TSH SERPL DL<=0.05 MIU/L-ACNC: 1.47 MIU/L (ref 0.34–5.6)
VIT B12 SERPL-MCNC: 528 PG/ML (ref 180–914)

## 2023-01-10 LAB
COPPER SERPL-MCNC: 89 MCG/DL (ref 70–175)
METHYLMALONATE SERPL-SCNC: 144 NMOL/L (ref 87–318)
SELENIUM SERPL-MCNC: 154 MCG/L (ref 63–160)
ZINC SERPL-MCNC: 81 MCG/DL (ref 60–130)

## 2023-01-11 LAB — VIT B2 SERPL-SCNC: 23.5 NMOL/L (ref 6.2–39)

## 2023-01-12 LAB
AA+LINOLEATE/FATTY ACIDS.C14-C22 MFR BLD: 41 % BY WT
AA/EPA BLD: 18.4 {RATIO} (ref 3.7–40.7)
AA/FATTY ACIDS.C14-C22 MFR BLD: 9.2 % BY WT (ref 8.6–15.6)
DHA/FATTY ACIDS.C14-C22 MFR BLD: 1.6 % BY WT (ref 1.4–5.1)
DPA/FATTY ACIDS.C14-C22 MFR BLD: 0.8 % BY WT (ref 0.8–1.8)
EPA+DPA+DHA/FA.C14-C22 RISK BLD IMP: 2.9 % BY WT
EPA+DPA+DHA/FATTY ACIDS.C14-C22 MFR BLD: 2.9 % BY WT
EPA/FATTY ACIDS.C14-C22 MFR BLD: 0.5 % BY WT (ref 0.2–2.3)
LINOLEATE/FATTY ACIDS.C14-C22 MFR BLD: 28.6 % BY WT (ref 18.6–29.5)
MAGNESIUM RBC-MCNC: 5.9 MG/DL (ref 4–6.4)
UBIQUINONE10 SERPL-MCNC: 1.42 UG/ML
VIT B1 SERPL-SCNC: 12 NMOL/L (ref 8–30)
W6 FA/W3 FA BLD: 14.1 {RATIO} (ref 3.7–14.4)

## 2023-01-14 LAB
MTHFR GENE MUT ANL BLD/T: NORMAL
MTHFR GENE MUT ANL BLD/T: POSITIVE

## 2023-01-19 LAB — VIT B6 SERPL-MCNC: 16 NG/ML (ref 2.1–21.7)

## 2023-04-13 ENCOUNTER — TELEPHONE (OUTPATIENT)
Dept: PRIMARY CARE | Facility: CLINIC | Age: 68
End: 2023-04-13
Payer: MEDICARE

## 2023-04-13 NOTE — TELEPHONE ENCOUNTER
"Pt scheduled appt for 6/7 with comment: \"Pain in right kidney area\". Spoke to pt and asked if she wanted to be seen sooner. She said yes but did not want to be billed for emergency visit. Scheduled for OV next week with Dr. Winn. Pt said this is an issue she has seen another doctor about before, who told her to follow up with primary (not emergent need).  "

## 2023-04-19 ENCOUNTER — OFFICE VISIT (OUTPATIENT)
Dept: PRIMARY CARE | Facility: CLINIC | Age: 68
End: 2023-04-19
Payer: MEDICARE

## 2023-04-19 VITALS
BODY MASS INDEX: 30.25 KG/M2 | DIASTOLIC BLOOD PRESSURE: 90 MMHG | RESPIRATION RATE: 12 BRPM | WEIGHT: 177.2 LBS | HEIGHT: 64 IN | SYSTOLIC BLOOD PRESSURE: 150 MMHG | HEART RATE: 84 BPM | OXYGEN SATURATION: 97 % | TEMPERATURE: 97.2 F

## 2023-04-19 DIAGNOSIS — M54.50 PAIN IN RIGHT LUMBAR REGION OF BACK: Primary | ICD-10-CM

## 2023-04-19 PROCEDURE — G8755 DIAS BP > OR = 90: HCPCS | Performed by: FAMILY MEDICINE

## 2023-04-19 PROCEDURE — 99213 OFFICE O/P EST LOW 20 MIN: CPT | Performed by: FAMILY MEDICINE

## 2023-04-19 PROCEDURE — G8753 SYS BP > OR = 140: HCPCS | Performed by: FAMILY MEDICINE

## 2023-04-19 NOTE — PROGRESS NOTES
Rt back pain, since August - 3 herniated disc in back - burning feeling on Rt side comes/goes    Staph in stool per [facility] - PT report    MOLD In basement found - BLACK MOLD - had a fungus ball in sinus removed

## 2023-04-19 NOTE — PROGRESS NOTES
Patient ID: Adeola Rees is a 67 y.o. female.    Subjective     HPI, patient was mistakenly thinking that she had permanent renal problems.  I reviewed with her her BUN and creatinine which are normal patient has some tenderness noticed in the right lumbar region comes and goes she has no blood in the urine she has no urinary frequency or dysuria  Chief Complaint   Patient presents with   • Rt kidney hurting since August       Patient Active Problem List   Diagnosis   • Left leg pain   • Left leg numbness   • Bite, insect   • Constipation   • Abdominal bloating   • Fecal impaction (CMS/HCC)   • Headache, chronic daily   • Memory loss   • Well adult health check   • Fatty liver   • Screening for lipid disorders   • Screening for diabetes mellitus (DM)   • Smoker   • Hypertension   • Chronic bilateral low back pain with bilateral sciatica   • Vitamin D deficiency   • B12 deficiency   • Chronic right lower quadrant pain   • Localized edema   • Hair loss   • Homocystinemia (CMS/HCC)   • Acute pain of left shoulder   • Former smoker   • Abscess of right buttock   • Exposure to COVID-19 virus   • Rash   • Abdominal discomfort, generalized   • History of multiple allergies       Past Medical History:   Diagnosis Date   • Cancer (CMS/HCC)     skin cancer-basal   • Celiac disease    • Chemical sensitivity    • COPD (chronic obstructive pulmonary disease) (CMS/HCC)    • Depression    • Environmental allergies    • Female infertility    • H/O gastroesophageal reflux (GERD)    • High cholesterol    • History of bronchitis    • History of sinusitis    • History of skin cancer    • History of traumatic head injury    • Hypertension    • Nasal sinus polyp    • Osteoarthritis    • Osteopenia    • Psoriasis    • Sleep apnea        Past Surgical History:   Procedure Laterality Date   • APPENDECTOMY     • APPENDECTOMY  1970   • COLONOSCOPY  07/2019   • HYSTERECTOMY  1995   • OOPHORECTOMY     • TONSILLECTOMY     • TONSILLECTOMY   1964   • UPPER GASTROINTESTINAL ENDOSCOPY  2019   • WISDOM TOOTH EXTRACTION  1976       Family History   Problem Relation Age of Onset   • Hypertension Biological Mother    • Obesity Biological Mother    • Kidney disease Biological Mother    • Hypertension Biological Father    • Obesity Biological Father    • Heart disease Biological Father    • Diabetes Biological Father    • Arthritis Biological Father    • Thyroid cancer Biological Sister    • Cancer Biological Sister    • ADD / ADHD Biological Sister    • Hypertension Biological Brother    • Eczema Biological Brother    • Heart disease Maternal Grandmother    • Hypertension Maternal Grandmother    • Obesity Maternal Grandmother    • Depression Maternal Grandmother    • Hypertension Maternal Grandfather    • Heart disease Maternal Grandfather    • Stroke Maternal Grandfather    • Dementia Paternal Grandmother    • Diabetes Paternal Grandfather    • Cancer Paternal Grandfather    • Lung cancer Neg Hx        Social History     Socioeconomic History   • Marital status:      Spouse name: Cristopher    • Number of children: 2   • Years of education: None   • Highest education level: None   Occupational History   • Occupation: homemaker   Tobacco Use   • Smoking status: Former     Packs/day: 1.00     Years: 48.00     Pack years: 48.00     Types: Cigarettes     Start date: 1968     Quit date: 2016     Years since quittin.3     Passive exposure: Past   • Smokeless tobacco: Never   • Tobacco comments:     Uses lozenges   Vaping Use   • Vaping status: Never Used     Passive vaping exposure: Yes   Substance and Sexual Activity   • Alcohol use: Not Currently   • Drug use: Never   • Sexual activity: Defer   Social History Narrative    Lives with her , has two adopted children. (22).     Social Determinants of Health     Food Insecurity: No Food Insecurity (2022)    Hunger Vital Sign    • Worried About Running Out of Food in the Last Year:  "Never true    • Ran Out of Food in the Last Year: Never true       Current Outpatient Medications   Medication Sig Dispense Refill   • CALCIUM ORAL Take by mouth. Natural Vitality Calm and Calcium 1 tsp BID     • MAGNESIUM ORAL Take by mouth.     • nicotine polacrilex (COMMIT) 4 mg lozenge Apply 4 mg to cheek See admin instr. q 5 hours PRN     • tretinoin (RETIN-A TOP) Apply topically.     • cholecalciferol, vitamin D3, 5,000 unit (125 mcg) capsule Take 5,000 Units by mouth daily.     • cyanocobalamin, vitamin B-12, (VITAMIN B-12 ORAL) Take 3 drops by mouth 3 (three) times a day. Vitamin shoppe       No current facility-administered medications for this visit.       Alllergies   Bee sting [bee venom protein (honey bee)], Honey, Penicillins, Shellfish derived, Sulfa (sulfonamide antibiotics), Clindamycin, Gold au 198, Nickel, Ceftin [cefuroxime axetil], Ciprofibrate, and Ciprofloxacin      The following have been reviewed and updated as appropriate in this visit:        Review of Systems no fevers chills chest pain shortness of breath no hematuria no dysuria frequency or urgencyObjective     Vitals:    04/19/23 1411   BP: (!) 150/90   BP Location: Right upper arm   Patient Position: Sitting   Pulse: 84   Resp: 12   Temp: 36.2 °C (97.2 °F)   SpO2: 97%   Weight: 80.4 kg (177 lb 3.2 oz)   Height: 1.626 m (5' 4\")     Body mass index is 30.42 kg/m².    Physical Exam heart regular rate and rhythm lungs clear to auscultation patient has normal flexion extension lateral bends she has no CVA tenderness no suprapubic tenderness she has point muscle tenderness in the right paraspinal muscles    Assessment/Plan   Problem List Items Addressed This Visit    None  Blood pressure elevated patient should return to office for rechecks of blood pressure  Patient does not like to take medication, patient will use a heating pad 20 minutes twice a day she will try rubbing the area if she is not improving she should return to the office " she could also consider getting an x-ray which she will put off for right now

## 2023-07-18 ENCOUNTER — APPOINTMENT (OUTPATIENT)
Dept: LAB | Age: 68
End: 2023-07-18
Payer: MEDICARE

## 2023-07-18 ENCOUNTER — OFFICE VISIT (OUTPATIENT)
Dept: PRIMARY CARE | Facility: CLINIC | Age: 68
End: 2023-07-18
Payer: MEDICARE

## 2023-07-18 VITALS
TEMPERATURE: 97.8 F | HEIGHT: 67 IN | WEIGHT: 170.2 LBS | OXYGEN SATURATION: 98 % | SYSTOLIC BLOOD PRESSURE: 118 MMHG | RESPIRATION RATE: 18 BRPM | BODY MASS INDEX: 26.71 KG/M2 | HEART RATE: 72 BPM | DIASTOLIC BLOOD PRESSURE: 74 MMHG

## 2023-07-18 DIAGNOSIS — R79.83 HOMOCYSTINEMIA: ICD-10-CM

## 2023-07-18 DIAGNOSIS — I10 PRIMARY HYPERTENSION: ICD-10-CM

## 2023-07-18 DIAGNOSIS — Z78.0 MENOPAUSE: ICD-10-CM

## 2023-07-18 DIAGNOSIS — I10 PRIMARY HYPERTENSION: Primary | ICD-10-CM

## 2023-07-18 DIAGNOSIS — R73.09 ELEVATED GLUCOSE: ICD-10-CM

## 2023-07-18 DIAGNOSIS — J44.9 CHRONIC OBSTRUCTIVE PULMONARY DISEASE, UNSPECIFIED COPD TYPE (CMS/HCC): ICD-10-CM

## 2023-07-18 DIAGNOSIS — Z13.228 ENCOUNTER FOR SCREENING FOR METABOLIC DISORDER: ICD-10-CM

## 2023-07-18 LAB
ALBUMIN SERPL-MCNC: 4.4 G/DL (ref 3.5–5.7)
ALP SERPL-CCNC: 65 IU/L (ref 34–104)
ALT SERPL-CCNC: 28 IU/L (ref 7–52)
ANION GAP SERPL CALC-SCNC: 8 MEQ/L (ref 3–15)
AST SERPL-CCNC: 28 IU/L (ref 13–39)
BASOPHILS # BLD: 0.04 K/UL (ref 0.01–0.1)
BASOPHILS NFR BLD: 0.8 %
BILIRUB SERPL-MCNC: 0.7 MG/DL (ref 0.3–1)
BUN SERPL-MCNC: 13 MG/DL (ref 7–25)
CALCIUM SERPL-MCNC: 9 MG/DL (ref 8.6–10.3)
CHLORIDE SERPL-SCNC: 104 MEQ/L (ref 98–107)
CHOLEST SERPL-MCNC: 230 MG/DL
CO2 SERPL-SCNC: 28 MEQ/L (ref 21–31)
CREAT SERPL-MCNC: 0.7 MG/DL (ref 0.6–1.2)
CREAT UR-MCNC: 40.3 MG/DL
DIFFERENTIAL METHOD BLD: ABNORMAL
EOSINOPHIL # BLD: 0.2 K/UL (ref 0.04–0.36)
EOSINOPHIL NFR BLD: 3.9 %
ERYTHROCYTE [DISTWIDTH] IN BLOOD BY AUTOMATED COUNT: 12.8 % (ref 11.7–14.4)
EST. AVERAGE GLUCOSE BLD GHB EST-MCNC: 108 MG/DL
GFR SERPL CREATININE-BSD FRML MDRD: >60 ML/MIN/1.73M*2
GLUCOSE SERPL-MCNC: 73 MG/DL (ref 70–99)
HBA1C MFR BLD: 5.4 %
HCT VFR BLDCO AUTO: 44.5 % (ref 35–45)
HDLC SERPL-MCNC: 66 MG/DL
HDLC SERPL: 3.5 {RATIO}
HGB BLD-MCNC: 14.2 G/DL (ref 11.8–15.7)
IMM GRANULOCYTES # BLD AUTO: 0.03 K/UL (ref 0–0.08)
IMM GRANULOCYTES NFR BLD AUTO: 0.6 %
LDLC SERPL CALC-MCNC: 142 MG/DL
LYMPHOCYTES # BLD: 1.65 K/UL (ref 1.2–3.5)
LYMPHOCYTES NFR BLD: 31.9 %
MCH RBC QN AUTO: 29.9 PG (ref 28–33.2)
MCHC RBC AUTO-ENTMCNC: 31.9 G/DL (ref 32.2–35.5)
MCV RBC AUTO: 93.7 FL (ref 83–98)
MICROALBUMIN UR-MCNC: <2 MG/L
MICROALBUMIN/CREAT UR: NORMAL MG/G{CREAT}
MONOCYTES # BLD: 0.46 K/UL (ref 0.28–0.8)
MONOCYTES NFR BLD: 8.9 %
NEUTROPHILS # BLD: 2.79 K/UL (ref 1.7–7)
NEUTS SEG NFR BLD: 53.9 %
NONHDLC SERPL-MCNC: 164 MG/DL
NRBC BLD-RTO: 0 %
PDW BLD AUTO: 9.8 FL (ref 9.4–12.3)
PLATELET # BLD AUTO: 255 K/UL (ref 150–369)
POTASSIUM SERPL-SCNC: 4.3 MEQ/L (ref 3.5–5.1)
PROT SERPL-MCNC: 6.6 G/DL (ref 6.4–8.9)
RBC # BLD AUTO: 4.75 M/UL (ref 3.93–5.22)
SODIUM SERPL-SCNC: 140 MEQ/L (ref 136–145)
TRIGL SERPL-MCNC: 111 MG/DL
TSH SERPL DL<=0.05 MIU/L-ACNC: 1.93 MIU/L (ref 0.34–5.6)
WBC # BLD AUTO: 5.17 K/UL (ref 3.8–10.5)

## 2023-07-18 PROCEDURE — 84443 ASSAY THYROID STIM HORMONE: CPT

## 2023-07-18 PROCEDURE — 85025 COMPLETE CBC W/AUTO DIFF WBC: CPT

## 2023-07-18 PROCEDURE — 83036 HEMOGLOBIN GLYCOSYLATED A1C: CPT

## 2023-07-18 PROCEDURE — 80061 LIPID PANEL: CPT

## 2023-07-18 PROCEDURE — G8754 DIAS BP LESS 90: HCPCS

## 2023-07-18 PROCEDURE — G8752 SYS BP LESS 140: HCPCS

## 2023-07-18 PROCEDURE — 36415 COLL VENOUS BLD VENIPUNCTURE: CPT

## 2023-07-18 PROCEDURE — 82043 UR ALBUMIN QUANTITATIVE: CPT

## 2023-07-18 PROCEDURE — 99214 OFFICE O/P EST MOD 30 MIN: CPT

## 2023-07-18 PROCEDURE — 80053 COMPREHEN METABOLIC PANEL: CPT

## 2023-07-18 NOTE — PROGRESS NOTES
HISTORY OF PRESENT ILLNESS:  Cesario Gonzalez is a 72 year old right-handed male whom returns regarding Advanced Glenohumeral Osteoarthritis, LEFT Shoulder. He was advised to proceed with CT Scan in preparation for LEFT REVERSE Total Shoulder. Here to review his CT Scan and discuss whether surgery is even an option due to the amount of erosion and destruction of his glenoid.     Diabetic, Metformin and Jardiance, Last A1c is unknown.      Accompanied by No One  Date of Onset: Several months ago     Occupation/School: None  PT: None    Review of Systems   Constitutional: Negative for chills and fever.   HENT: Negative for congestion, sore throat and tinnitus.    Eyes: Negative for redness.   Respiratory: Negative for cough, shortness of breath, wheezing and stridor.    Cardiovascular: Negative for leg swelling.   Gastrointestinal: Negative for diarrhea, nausea and vomiting.   Endocrine: Negative for cold intolerance and heat intolerance.   Musculoskeletal: Negative for arthralgias, joint swelling and myalgias.   Skin: Negative for color change and pallor.   Neurological: Negative for syncope, weakness and numbness.   Hematological: Does not bruise/bleed easily.   Psychiatric/Behavioral: Negative for agitation, confusion and self-injury.       No past medical history on file.     No past surgical history on file.     No family history on file.     Social History     Tobacco Use   Smoking Status Former Smoker   Smokeless Tobacco Never Used      Social History     Substance and Sexual Activity   Alcohol Use Yes    Comment: socially      Social History     Substance and Sexual Activity   Drug Use Never         MEDICATIONS:  Current Outpatient Medications   Medication Sig Dispense Refill   • atorvastatin (LIPITOR) 20 MG tablet Take 1 tablet by mouth daily. 90 tablet 3   • empagliflozin (JARDIANCE) 25 MG tablet Take 1 tablet by mouth daily (before breakfast). 30 tablet 0   • metformin (GLUCOPHAGE) 1000 MG tablet Take 1  Subjective      Annual Exam     Patient ID: Adeolatriny Rees is a 67 y.o. female presents today c/o:    HPI  Pt presents today for physical exam.  Persistent chronic pain over Right kidney.  Reports that it increases about an hour after she eats every day.  Pt reports that she is thirsty in AM.  Drinks a lot of water.    Pt declines welcome to medicare today    Nephrology:   Dentist: every 6 months    Gynecology: follows yearly - hx of hysterectomy - reports that there is a lot of scar tissue  Lots of scar tissue and hx abdominal hernias    Diet: avoids dairy, mostly vegan.    Exercise: pt rows     Mammogram: pt declines today.   Colonoscopy: declines today      The following have been reviewed and updated as appropriate in this visit:   Tobacco  Allergies  Meds  Problems  Med Hx  Surg Hx  Fam Hx       Review of Systems   All other systems reviewed and are negative.    Current Outpatient Medications   Medication Sig Dispense Refill   • omega-3-dha-epa-dpa-fish oil 1,050 mg(300 mg -675 mg-75 mg) capsule Take 1 capsule by mouth daily.     • VIT D3-VIT K2-OLIVE LEAF EXT ORAL Take 2 drops by mouth 2 (two) times a day. 2 drops + 1000 IU Vit d     • CALCIUM ORAL Take by mouth. Natural Vitality Calm and Calcium 1 tsp BID     • MAGNESIUM ORAL Take by mouth.     • nicotine polacrilex (COMMIT) 4 mg lozenge Apply 4 mg to cheek See admin instr. q 5 hours PRN     • tretinoin (RETIN-A TOP) Apply topically.       No current facility-administered medications for this visit.     Past Medical History:   Diagnosis Date   • Cancer (CMS/[facility])     skin cancer-basal   • Celiac disease    • Chemical sensitivity    • COPD (chronic obstructive pulmonary disease) (CMS/[facility])    • Depression    • Environmental allergies    • Female infertility    • H/O gastroesophageal reflux (GERD)    • High cholesterol    • History of bronchitis    • History of sinusitis    • History of skin cancer    • History of traumatic head injury    • Hypertension     • Nasal sinus polyp    • Osteoarthritis    • Osteopenia    • Psoriasis    • Sleep apnea      Family History   Problem Relation Age of Onset   • Hypertension Biological Mother    • Obesity Biological Mother    • Kidney disease Biological Mother    • Hypertension Biological Father    • Obesity Biological Father    • Heart disease Biological Father    • Diabetes Biological Father    • Arthritis Biological Father    • Thyroid cancer Biological Sister    • Cancer Biological Sister    • ADD / ADHD Biological Sister    • Hypertension Biological Brother    • Eczema Biological Brother    • Heart disease Maternal Grandmother    • Hypertension Maternal Grandmother    • Obesity Maternal Grandmother    • Depression Maternal Grandmother    • Hypertension Maternal Grandfather    • Heart disease Maternal Grandfather    • Stroke Maternal Grandfather    • Dementia Paternal Grandmother    • Diabetes Paternal Grandfather    • Cancer Paternal Grandfather    • Lung cancer Neg Hx      Allergies   Allergen Reactions   • Bee Sting [Bee Venom Protein (Honey Bee)] Anaphylaxis   • Honey    • Shellfish Derived Hives   • Sulfa (Sulfonamide Antibiotics) Anaphylaxis   • Clindamycin    • Gold Au 198 Itching and Other (see comments)     swelling   • Nickel Itching and Other (see comments)     swelling   • Vancomycin      Kidney failure due to high levels  August 2022   • Ceftin [Cefuroxime Axetil] Other (see comments)     Per patient bone pain   • Ciprofibrate    • Ciprofloxacin      Other reaction(s): Muscular pain     Past Surgical History:   Procedure Laterality Date   • APPENDECTOMY     • APPENDECTOMY  1970   • COLONOSCOPY  07/2019   • HYSTERECTOMY  1995   • OOPHORECTOMY     • TONSILLECTOMY     • TONSILLECTOMY  1964   • UPPER GASTROINTESTINAL ENDOSCOPY  07/2019   • WISDOM TOOTH EXTRACTION  1976     Social History     Socioeconomic History   • Marital status:      Spouse name: Cristopher    • Number of children: 2   • Years of education:  tablet by mouth 2 times daily. 180 tablet 0   • Alcohol Swabs (B-D SINGLE USE SWABS REGULAR) Pads      • Santyl 250 UNIT/GM ointment      • True Metrix Blood Glucose Test test strip      • TRUEplus Lancets 33G Misc      • aspirin 81 MG chewable tablet daily.     • metoPROLOL tartrate (LOPRESSOR) 25 MG tablet Take 1 tablet by mouth every 12 hours. 60 tablet 0   • tamsulosin (FLOMAX) 0.4 MG Cap Take 1 capsule by mouth daily. 30 capsule 0   • acetaminophen (TYLENOL) 325 MG tablet Take 650 mg by mouth every 4 hours as needed for Fever or Pain (mild pain). Do not start before May 4, 2021.       No current facility-administered medications for this visit.        ALLERGIES:  No Known Allergies    PHYSICAL EXAMINATION:    LEFT Shoulder: No TTP Anterior Cuff, Mild TTP Biceps Tendon, Mild TTP AC Joint, Posterior or Greater Tuberosity. No Atrophy. AROM: FF = 40 / ABD = 90 / ER1 = 20 / ER2 = unable to perform  / IR = hip pocket. Significant glenohumeral crepitus. Strength: Supraspinatus 4/5 with pain, Infraspinatus 4/5 with pain, Subscapularis 5/5 without pain. Negative ER Lag. Negative Lift-Off. Negative Impingement Sign. Positive Guerra. Negative Arapahoe's. Negative Speed's. Negative Yergason's. Negative Load-Shift. No Anterior Apprehension. No anterior or posterior instability noted. NVI distally.    IMAGING & TESTING:     CT Scan LEFT Shoulder (8/31/2021):  Pre-Operative Planning Ct Scan - Severe advanced degenerative changes about the left shoulder.     Multiple Views of the LEFT Shoulder (8/13/2021): There is evidence of bone-on-bone arthritis with no joint space remaining, large inferior humeral head and glenoid osteophytes, and complete collapse of the humeral head seen on the axillary view.    ASSESSMENT & PLAN:  Cesario Gonzalez is a 72 year old male Advanced Glenohumeral Osteoarthritis, LEFT Shoulder.     Plan as follows:  1. Reviewed his Radiographs and CT Scan Biomet Signature Glenoid... surgery looks possible  2.  "None   • Highest education level: None   Occupational History   • Occupation: homemaker   Tobacco Use   • Smoking status: Former     Packs/day: 1.00     Years: 48.00     Pack years: 48.00     Types: Cigarettes     Start date: 1968     Quit date: 2016     Years since quittin.6     Passive exposure: Past   • Smokeless tobacco: Never   • Tobacco comments:     Uses lozenges   Vaping Use   • Vaping Use: Never used   Substance and Sexual Activity   • Alcohol use: Not Currently   • Drug use: Never   • Sexual activity: Defer   Social History Narrative    Lives with her , has two adopted children. (22).     Social Determinants of Health     Food Insecurity: No Food Insecurity (2022)    Hunger Vital Sign    • Worried About Running Out of Food in the Last Year: Never true    • Ran Out of Food in the Last Year: Never true            Objective     Vitals:   Vitals:    23 0921   BP: 118/74   Pulse: 72   Resp: 18   Temp: 36.6 °C (97.8 °F)   SpO2: 98%   Weight: 77.2 kg (170 lb 3.2 oz)   Height: 1.69 m (5' 6.54\")       Physical Exam  Vitals reviewed.   Constitutional:       Appearance: Normal appearance. She is normal weight.   HENT:      Head: Normocephalic and atraumatic.      Right Ear: Tympanic membrane normal.      Left Ear: Tympanic membrane and ear canal normal.      Nose: Nose normal.      Mouth/Throat:      Mouth: Mucous membranes are moist.      Pharynx: Oropharynx is clear.   Eyes:      Extraocular Movements: Extraocular movements intact.      Conjunctiva/sclera: Conjunctivae normal.      Pupils: Pupils are equal, round, and reactive to light.   Neck:      Thyroid: No thyroid mass, thyromegaly or thyroid tenderness.   Cardiovascular:      Rate and Rhythm: Normal rate and regular rhythm.      Pulses: Normal pulses.      Heart sounds: Normal heart sounds. No murmur heard.  Pulmonary:      Effort: Pulmonary effort is normal.      Breath sounds: Normal breath sounds. No wheezing. " Discussed REVERSE Total Shoulder Arthroplasty in great detail.   3. Reviewed the diagnosis and treatment options at this point. Surgery is the next step to consider. Surgery was explained in detail, the procedure, the risks and benefits and the post-operative rehab and recovery. All questions were answered. When ready, the patient will contact my Surgical Scheduler to arrange for LEFT REVERSE Total Shoulder Arthroplasty   4. He will call my Surgery Scheduler to arrange.     Medical Decision Making:, Personally Reviewed outside xrays, Personally Reviewed other outside imaging including: CT Scan and Personally Discussed Surgery: LEFT REVERSE Total Shoulder Arthroplasty  and the risks including, but not limited to infection, blood loss, and death     Gabriele Baum MD  09/02/21     Abdominal:      General: Abdomen is flat. Bowel sounds are normal.      Palpations: Abdomen is soft.      Tenderness: There is no abdominal tenderness.   Musculoskeletal:         General: Normal range of motion.      Cervical back: Normal range of motion and neck supple.   Lymphadenopathy:      Cervical: Cervical adenopathy present.   Skin:     General: Skin is warm and dry.   Neurological:      General: No focal deficit present.      Mental Status: She is alert and oriented to person, place, and time.   Psychiatric:         Mood and Affect: Mood normal.         Behavior: Behavior normal.         Thought Content: Thought content normal.         Judgment: Judgment normal.         Labs  Lab Results   Component Value Date    WBC 5.58 01/05/2023    HGB 15.0 01/05/2023    HCT 45.8 (H) 01/05/2023     01/05/2023    CHOL 223 (H) 09/12/2020    TRIG 118 09/12/2020    HDL 47 (L) 09/12/2020    LDLCALC 152 (H) 09/12/2020    ALT 27 01/05/2023    AST 24 01/05/2023     (L) 01/05/2023    K 4.6 01/05/2023    GLUCOSE 73 01/05/2023    CL 98 01/05/2023    CREATININE 0.7 01/05/2023    BUN 10 01/05/2023    CO2 23 01/05/2023    TSH 1.47 01/05/2023    T4F 1.1 03/27/2022    EGFR >60.0 01/05/2023              Assessment/Plan   Adeola was seen today for annual exam.    Diagnoses and all orders for this visit:    Primary hypertension  Comments:  will get routine labwork  Orders:  -     Comprehensive metabolic panel; Future  -     CBC and differential; Future  -     Comprehensive metabolic panel  -     CBC and differential    Encounter for screening for metabolic disorder  Comments:  routine labwork ordered  Orders:  -     Hemoglobin A1c; Future  -     Lipid panel; Future  -     TSH w reflex FT4; Future  -     Microalbumin / creatinine urine ratio; Future  -     Hemoglobin A1c  -     Lipid panel  -     TSH w reflex FT4  -     Microalbumin / creatinine urine ratio    Elevated glucose  Comments:  routine labwork ordered  Orders:  -      Microalbumin / creatinine urine ratio; Future  -     Microalbumin / creatinine urine ratio    Menopause  Comments:  dexa scan ordered  Orders:  -     DEXA BONE DENSITY; Future    Chronic obstructive pulmonary disease, unspecified COPD type (CMS/HCC)  Comments:  managed by PCP    Homocystinemia (CMS/[facility])  Comments:  managed by pcp         Patient verbalizes understanding and agrees with plan of care today.        RAI Otto  7/18/2023

## 2023-08-16 ENCOUNTER — HOSPITAL ENCOUNTER (OUTPATIENT)
Dept: RADIOLOGY | Age: 68
Discharge: HOME | End: 2023-08-16
Payer: MEDICARE

## 2023-08-16 DIAGNOSIS — Z78.0 MENOPAUSE: ICD-10-CM

## 2023-08-16 PROCEDURE — 77080 DXA BONE DENSITY AXIAL: CPT

## 2023-09-27 ENCOUNTER — APPOINTMENT (OUTPATIENT)
Dept: URBAN - METROPOLITAN AREA CLINIC 203 | Age: 68
Setting detail: DERMATOLOGY
End: 2023-09-27

## 2023-09-27 ENCOUNTER — RX ONLY (RX ONLY)
Age: 68
End: 2023-09-27

## 2023-09-27 DIAGNOSIS — B07.8 OTHER VIRAL WARTS: ICD-10-CM

## 2023-09-27 DIAGNOSIS — L57.8 OTHER SKIN CHANGES DUE TO CHRONIC EXPOSURE TO NONIONIZING RADIATION: ICD-10-CM

## 2023-09-27 DIAGNOSIS — D485 NEOPLASM OF UNCERTAIN BEHAVIOR OF SKIN: ICD-10-CM

## 2023-09-27 DIAGNOSIS — Z85.828 PERSONAL HISTORY OF OTHER MALIGNANT NEOPLASM OF SKIN: ICD-10-CM

## 2023-09-27 DIAGNOSIS — D22 MELANOCYTIC NEVI: ICD-10-CM

## 2023-09-27 PROBLEM — D22.61 MELANOCYTIC NEVI OF RIGHT UPPER LIMB, INCLUDING SHOULDER: Status: ACTIVE | Noted: 2023-09-27

## 2023-09-27 PROBLEM — D48.5 NEOPLASM OF UNCERTAIN BEHAVIOR OF SKIN: Status: ACTIVE | Noted: 2023-09-27

## 2023-09-27 PROCEDURE — OTHER BIOPSY BY SHAVE METHOD: OTHER

## 2023-09-27 PROCEDURE — 11103 TANGNTL BX SKIN EA SEP/ADDL: CPT | Mod: 59

## 2023-09-27 PROCEDURE — OTHER SUNSCREEN RECOMMENDATIONS: OTHER

## 2023-09-27 PROCEDURE — 99213 OFFICE O/P EST LOW 20 MIN: CPT | Mod: 25

## 2023-09-27 PROCEDURE — 11102 TANGNTL BX SKIN SINGLE LES: CPT | Mod: 59

## 2023-09-27 PROCEDURE — 17110 DESTRUCT B9 LESION 1-14: CPT

## 2023-09-27 PROCEDURE — OTHER LIQUID NITROGEN: OTHER

## 2023-09-27 PROCEDURE — OTHER COUNSELING: OTHER

## 2023-09-27 PROCEDURE — OTHER PHOTO-DOCUMENTATION: OTHER

## 2023-09-27 RX ORDER — SODIUM SULFACETAMIDE AND SULFUR 80; 40 MG/ML; MG/ML
LOTION TOPICAL
Qty: 1 | Refills: 6 | Status: ERX

## 2023-09-27 RX ORDER — TAZAROTENE 0.1 MG/G
.1% CREAM CUTANEOUS QD
Qty: 30 | Refills: 5 | Status: ERX

## 2023-09-27 ASSESSMENT — LOCATION DETAILED DESCRIPTION DERM
LOCATION DETAILED: EPIGASTRIC SKIN
LOCATION DETAILED: RIGHT ANTERIOR DISTAL UPPER ARM
LOCATION DETAILED: LEFT MEDIAL BREAST 10-11:00 REGION
LOCATION DETAILED: LEFT MEDIAL BREAST 11-12:00 REGION
LOCATION DETAILED: RIGHT LATERAL SHOULDER
LOCATION DETAILED: LEFT LATERAL PROXIMAL PRETIBIAL REGION

## 2023-09-27 ASSESSMENT — LOCATION SIMPLE DESCRIPTION DERM
LOCATION SIMPLE: RIGHT SHOULDER
LOCATION SIMPLE: LEFT BREAST
LOCATION SIMPLE: ABDOMEN
LOCATION SIMPLE: LEFT PRETIBIAL REGION
LOCATION SIMPLE: RIGHT UPPER ARM

## 2023-09-27 ASSESSMENT — LOCATION ZONE DERM
LOCATION ZONE: LEG
LOCATION ZONE: ARM
LOCATION ZONE: TRUNK

## 2023-09-27 NOTE — PROCEDURE: LIQUID NITROGEN
Add 52 Modifier (Optional): no
Consent: The patient's consent was obtained including but not limited to risks of crusting, scabbing, blistering, scarring, darker or lighter pigmentary change, recurrence, incomplete removal and infection.
Spray Paint Text: The liquid nitrogen was applied to the skin utilizing a spray paint frosting technique.
Medical Necessity Clause: This procedure was medically necessary because the lesions that were treated were:
Spray Paint Technique: Yes
Post-Care Instructions: I reviewed with the patient in detail post-care instructions. Patient is to wear sunprotection, and avoid picking at any of the treated lesions. Pt may apply Vaseline to crusted or scabbing areas.
Medical Necessity Information: It is in your best interest to select a reason for this procedure from the list below. All of these items fulfill various CMS LCD requirements except the new and changing color options.
Detail Level: Detailed

## 2023-09-27 NOTE — PROCEDURE: BIOPSY BY SHAVE METHOD
Include Location In Plan?: No Detail Level: Generalized Information: Selecting Yes will display possible errors in your note based on the variables you have selected. This validation is only offered as a suggestion for you. PLEASE NOTE THAT THE VALIDATION TEXT WILL BE REMOVED WHEN YOU FINALIZE YOUR NOTE. IF YOU WANT TO FAX A PRELIMINARY NOTE YOU WILL NEED TO TOGGLE THIS TO 'NO' IF YOU DO NOT WANT IT IN YOUR FAXED NOTE.

## 2023-10-16 ENCOUNTER — OFFICE VISIT (OUTPATIENT)
Dept: PRIMARY CARE | Facility: CLINIC | Age: 68
End: 2023-10-16
Payer: MEDICARE

## 2023-10-16 VITALS
DIASTOLIC BLOOD PRESSURE: 88 MMHG | TEMPERATURE: 97.7 F | SYSTOLIC BLOOD PRESSURE: 128 MMHG | HEIGHT: 66 IN | HEART RATE: 91 BPM | BODY MASS INDEX: 28.48 KG/M2 | WEIGHT: 177.2 LBS | RESPIRATION RATE: 16 BRPM | OXYGEN SATURATION: 99 %

## 2023-10-16 DIAGNOSIS — Z87.891 FORMER SMOKER: ICD-10-CM

## 2023-10-16 DIAGNOSIS — G89.29 RIGHT FLANK PAIN, CHRONIC: Primary | ICD-10-CM

## 2023-10-16 DIAGNOSIS — R10.9 RIGHT FLANK PAIN, CHRONIC: Primary | ICD-10-CM

## 2023-10-16 DIAGNOSIS — F17.200 SMOKER: ICD-10-CM

## 2023-10-16 PROCEDURE — G8752 SYS BP LESS 140: HCPCS | Performed by: FAMILY MEDICINE

## 2023-10-16 PROCEDURE — 99213 OFFICE O/P EST LOW 20 MIN: CPT | Performed by: FAMILY MEDICINE

## 2023-10-16 PROCEDURE — G8754 DIAS BP LESS 90: HCPCS | Performed by: FAMILY MEDICINE

## 2023-10-16 ASSESSMENT — ENCOUNTER SYMPTOMS
GASTROINTESTINAL NEGATIVE: 1
EYE ITCHING: 0
WHEEZING: 0
UNEXPECTED WEIGHT CHANGE: 0
COUGH: 0
APPETITE CHANGE: 0
SINUS PAIN: 0
CHILLS: 0
PALPITATIONS: 0
EYE PAIN: 0
FATIGUE: 0
ACTIVITY CHANGE: 0
FLANK PAIN: 1
SINUS PRESSURE: 0
EYE DISCHARGE: 0
DIAPHORESIS: 0
VOICE CHANGE: 0
EYE REDNESS: 0
SHORTNESS OF BREATH: 0
CHEST TIGHTNESS: 0

## 2023-10-16 ASSESSMENT — PATIENT HEALTH QUESTIONNAIRE - PHQ9: SUM OF ALL RESPONSES TO PHQ9 QUESTIONS 1 & 2: 0

## 2023-10-16 NOTE — PROGRESS NOTES
Tamara Valdivia D.O.   [facility] in [address], [address]  [place] [address]  [phone]  Fax [phone]          History of Present Illness   Patient ID: Adeola Rees is a 67 y.o. female.    Subjective   Flank pain    HPI  Right flank pain-- off and on--- started since last hospital visit 2022---Pt has pain every day and night.   ---has not trouble with sleep.  Patient does not have any frequency urgency or burning on urination.  Patient has no abdominal complaints.  Patient is aware that she has had multiple images in the past --not of her kidney.  Feels that she may have a stone somewhere.  No hematuria is noted    No fever is noted        Pt also due for ct lung/low dose.  Needs new prescription.       Past Medical/Surgical/Family/Social History     The following have been reviewed and updated as appropriate in this visit:   Allergies  Meds  Problems         Past Medical History:   Diagnosis Date    Cancer (CMS/[facility])     skin cancer-basal    Celiac disease     Chemical sensitivity     COPD (chronic obstructive pulmonary disease) (CMS/[facility])     Depression     Environmental allergies     Female infertility     H/O gastroesophageal reflux (GERD)     High cholesterol     History of bronchitis     History of sinusitis     History of skin cancer     History of traumatic head injury     Hypertension     Nasal sinus polyp     Osteoarthritis     Osteopenia     Psoriasis     Sleep apnea        Past Surgical History:   Procedure Laterality Date    APPENDECTOMY      APPENDECTOMY  1970    COLONOSCOPY  07/2019    HYSTERECTOMY  1995    OOPHORECTOMY      TONSILLECTOMY      TONSILLECTOMY  1964    UPPER GASTROINTESTINAL ENDOSCOPY  07/2019    WISDOM TOOTH EXTRACTION  1976       Family History   Problem Relation Age of Onset    Hypertension Biological Mother     Obesity Biological Mother     Kidney disease Biological Mother     Hypertension  Biological Father     Obesity Biological Father     Heart disease Biological Father     Diabetes Biological Father     Arthritis Biological Father     Thyroid cancer Biological Sister     Cancer Biological Sister     ADD / ADHD Biological Sister     Hypertension Biological Brother     Eczema Biological Brother     Heart disease Maternal Grandmother     Hypertension Maternal Grandmother     Obesity Maternal Grandmother     Depression Maternal Grandmother     Hypertension Maternal Grandfather     Heart disease Maternal Grandfather     Stroke Maternal Grandfather     Dementia Paternal Grandmother     Diabetes Paternal Grandfather     Cancer Paternal Grandfather     Lung cancer Neg Hx        Social History     Socioeconomic History    Marital status:      Spouse name: Cristopher     Number of children: 2    Years of education: Not on file    Highest education level: Not on file   Occupational History    Occupation: homemaker   Tobacco Use    Smoking status: Former     Packs/day: 1.00     Years: 48.00     Additional pack years: 0.00     Total pack years: 48.00     Types: Cigarettes     Start date: 1968     Quit date: 2016     Years since quittin.8     Passive exposure: Past    Smokeless tobacco: Never    Tobacco comments:     Uses lozenges   Vaping Use    Vaping Use: Never used   Substance and Sexual Activity    Alcohol use: Not Currently    Drug use: Never    Sexual activity: Defer   Other Topics Concern    Not on file   Social History Narrative    Lives with her , has two adopted children. (22).     Social Determinants of Health     Financial Resource Strain: Not on file   Food Insecurity: No Food Insecurity (2022)    Hunger Vital Sign     Worried About Running Out of Food in the Last Year: Never true     Ran Out of Food in the Last Year: Never true   Transportation Needs: Not on file   Physical Activity: Not on file   Stress: Not on file   Social  Connections: Not on file   Intimate Partner Violence: Not on file   Housing Stability: Not on file      Allergies and Medications     Allergies   Allergen Reactions    Bee Sting [Bee Venom Protein (Honey Bee)] Anaphylaxis    Honey     Shellfish Derived Hives    Sulfa (Sulfonamide Antibiotics) Anaphylaxis    Clindamycin     Gold [address] Itching and Other (see comments)     swelling    Nickel Itching and Other (see comments)     swelling    Vancomycin      Kidney failure due to high levels  August 2022    Ceftin [Cefuroxime Axetil] Other (see comments)     Per patient bone pain    Ciprofibrate     Ciprofloxacin      Other reaction(s): Muscular pain         Current Outpatient Medications:     MAGNESIUM ORAL, Take by mouth., Disp: , Rfl:     nicotine polacrilex (COMMIT) 4 mg lozenge, Apply 4 mg to cheek See admin instr. q 5 hours PRN, Disp: , Rfl:     omega-3-dha-epa-dpa-fish oil 1,050 mg(300 mg -675 mg-75 mg) capsule, Take 1 capsule by mouth daily., Disp: , Rfl:     tretinoin (RETIN-A TOP), Apply topically., Disp: , Rfl:     VIT D3-VIT K2-OLIVE LEAF EXT ORAL, Take 2 drops by mouth 2 (two) times a day. 2 drops + 1000 IU Vit d, Disp: , Rfl:     CALCIUM ORAL, Take by mouth. Natural Vitality Calm and Calcium 1 tsp BID, Disp: , Rfl:    Review of Systems       Review of Systems   Constitutional: Negative for activity change, appetite change, chills, diaphoresis, fatigue and unexpected weight change.   HENT: Negative for congestion, hearing loss, postnasal drip, sinus pressure, sinus pain and voice change.    Eyes: Negative for pain, discharge, redness, itching and visual disturbance.   Respiratory: Negative for cough, chest tightness, shortness of breath and wheezing.    Cardiovascular: Negative for chest pain, palpitations and leg swelling.   Gastrointestinal: Negative.    Genitourinary: Positive for flank pain.      Physical Examination       Objective     Vitals:    10/16/23 1512   BP: 128/88   Pulse: 91  "  Resp: 16   Temp: 36.5 °C (97.7 °F)   SpO2: 99%       Vitals:    10/16/23 1512   BP: 128/88   BP Location: Left upper arm   Patient Position: Sitting   Pulse: 91   Resp: 16   Temp: 36.5 °C (97.7 °F)   TempSrc: Temporal   SpO2: 99%   Weight: 80.4 kg (177 lb 3.2 oz)   Height: 1.676 m (5' 6\")       Wt Readings from Last 3 Encounters:   10/16/23 80.4 kg (177 lb 3.2 oz)   07/18/23 77.2 kg (170 lb 3.2 oz)   04/19/23 80.4 kg (177 lb 3.2 oz)       Ht Readings from Last 3 Encounters:   10/16/23 1.676 m (5' 6\")   07/18/23 1.69 m (5' 6.54\")   04/19/23 1.626 m (5' 4\")       BP Readings from Last 3 Encounters:   10/16/23 128/88   07/18/23 118/74   04/19/23 (!) 150/90       Physical Exam  Vitals and nursing note reviewed.   Constitutional:       Appearance: Normal appearance. She is well-developed.   HENT:      Head: Normocephalic and atraumatic.      Right Ear: External ear normal.      Left Ear: External ear normal.      Nose: Nose normal.      Mouth/Throat:      Mouth: Mucous membranes are moist.      Pharynx: Oropharynx is clear.   Eyes:      Extraocular Movements: Extraocular movements intact.      Conjunctiva/sclera: Conjunctivae normal.      Pupils: Pupils are equal, round, and reactive to light.   Neck:      Thyroid: No thyromegaly.   Cardiovascular:      Rate and Rhythm: Normal rate and regular rhythm.      Heart sounds: Normal heart sounds. No murmur heard.  Pulmonary:      Effort: Pulmonary effort is normal.      Breath sounds: Normal breath sounds.   Abdominal:      General: Abdomen is flat. Bowel sounds are normal.      Palpations: Abdomen is soft.   Musculoskeletal:         General: Tenderness present.      Cervical back: Normal range of motion and neck supple.      Comments: Patient has paraspinal tenderness at the right lumbar area feels complaints are ALL due to kidney   Lymphadenopathy:      Cervical: No cervical adenopathy.   Neurological:      Mental Status: She is alert.        Laboratory Results     Lab " "Results   Component Value Date    WBC 5.17 07/18/2023    HGB 14.2 07/18/2023    HCT 44.5 07/18/2023    MCV 93.7 07/18/2023     07/18/2023          Chemistry        Component Value Date/Time     07/18/2023 1114    K 4.3 07/18/2023 1114     07/18/2023 1114    CO2 28 07/18/2023 1114    BUN 13 07/18/2023 1114    CREATININE 0.7 07/18/2023 1114        Component Value Date/Time    CALCIUM 9.0 07/18/2023 1114    ALKPHOS 65 07/18/2023 1114    AST 28 07/18/2023 1114    ALT 28 07/18/2023 1114    BILITOT 0.7 07/18/2023 1114            Lab Results   Component Value Date    GLUCOSE 73 07/18/2023    CALCIUM 9.0 07/18/2023     07/18/2023    K 4.3 07/18/2023    CO2 28 07/18/2023     07/18/2023    BUN 13 07/18/2023    CREATININE 0.7 07/18/2023       Lab Results   Component Value Date    CHOL 230 (H) 07/18/2023    CHOL 223 (H) 09/12/2020    CHOL 234 (H) 05/29/2020     Lab Results   Component Value Date    HDL 66 07/18/2023    HDL 47 (L) 09/12/2020    HDL 51 (L) 05/29/2020     Lab Results   Component Value Date    LDLCALC 142 (H) 07/18/2023    LDLCALC 152 (H) 09/12/2020    LDLCALC 163 (H) 05/29/2020     Lab Results   Component Value Date    TRIG 111 07/18/2023    TRIG 118 09/12/2020    TRIG 99 05/29/2020     No results found for: \"CHOLHDL\"    Lab Results   Component Value Date    TSH 1.93 07/18/2023       Lab Results   Component Value Date    HGBA1C 5.4 07/18/2023       No results found for: \"HEPCAB\"      There is no immunization history for the selected administration types on file for this patient.       Assessment and Plan       Assessment/Plan     Problem List Items Addressed This Visit        Nervous    Right flank pain, chronic - Primary    Current Assessment & Plan     Patient has had persistent flank pain attributing to kidney problems.    Aware that she has paraspinal tenderness which may be all related to her complaints but patient does not feel that way and recommending to complete " imaging    Ordered ultrasound of the kidneys         Relevant Orders    ULTRASOUND KIDNEYS       Mental Health    Smoker    Current Assessment & Plan     Patient is a former smoker and low-dose CT of the lung ordered         Former smoker    Relevant Orders    CT LUNG SCREENING WITHOUT IV CONTRAST       No follow-ups on file.    Orders Placed This Encounter   Procedures    ULTRASOUND KIDNEYS     Standing Status:   Future     Standing Expiration Date:   10/16/2024     Order Specific Question:   Release to patient     Answer:   Immediate [1]    CT LUNG SCREENING WITHOUT IV CONTRAST     Standing Status:   Future     Standing Expiration Date:   10/16/2024     Scheduling Instructions:      Call [phone] to schedule an appointment and meet your Lung Health Navigator.     Order Specific Question:   Does the patient show any signs or symptoms of lung cancer?     Answer:   No     Order Specific Question:   Is this the first (baseline) CT or an annual exam?     Answer:   Annual [2]     Order Specific Question:   Is this a low dose CT or a routine CT?     Answer:   Low Dose CT [1]     Order Specific Question:   Is there documentation of shared decision making?     Answer:   Yes     Order Specific Question:   Specify location patient would like procedure performed     Answer:   [facility]     Order Specific Question:   Release to patient     Answer:   Immediate [1]          Tamara Valdivia DO  10/16/2023

## 2023-10-17 NOTE — ASSESSMENT & PLAN NOTE
Patient has had persistent flank pain attributing to kidney problems.    Aware that she has paraspinal tenderness which may be all related to her complaints but patient does not feel that way and recommending to complete imaging    Ordered ultrasound of the kidneys

## 2023-11-07 ENCOUNTER — TELEPHONE (OUTPATIENT)
Dept: PULMONOLOGY | Facility: HOSPITAL | Age: 68
End: 2023-11-07
Payer: MEDICARE

## 2023-11-07 VITALS — BODY MASS INDEX: 28.45 KG/M2 | HEIGHT: 66 IN | WEIGHT: 177 LBS

## 2023-11-08 ENCOUNTER — HOSPITAL ENCOUNTER (OUTPATIENT)
Dept: RADIOLOGY | Age: 68
Discharge: HOME | End: 2023-11-08
Attending: FAMILY MEDICINE
Payer: MEDICARE

## 2023-11-08 DIAGNOSIS — R10.9 RIGHT FLANK PAIN, CHRONIC: ICD-10-CM

## 2023-11-08 DIAGNOSIS — G89.29 RIGHT FLANK PAIN, CHRONIC: ICD-10-CM

## 2023-11-08 PROCEDURE — 76775 US EXAM ABDO BACK WALL LIM: CPT

## 2023-11-10 ENCOUNTER — HOSPITAL ENCOUNTER (OUTPATIENT)
Dept: RADIOLOGY | Age: 68
Discharge: HOME | End: 2023-11-10
Attending: FAMILY MEDICINE
Payer: MEDICARE

## 2023-11-10 DIAGNOSIS — Z87.891 FORMER SMOKER: ICD-10-CM

## 2023-11-10 PROCEDURE — 71271 CT THORAX LUNG CANCER SCR C-: CPT

## 2023-11-15 NOTE — RESULT ENCOUNTER NOTE
Okay to notify Adeola that I did review her ultrasound she does not have any cysts or lesions noted in her kidney she also does not have any stones.  There is note of mild medical renal disease so I would double check a CMP and check kidney function I would also run a UA microalbumin.  Okay to order for patient Adequate: hears normal conversation without difficulty

## 2023-11-15 NOTE — RESULT ENCOUNTER NOTE
Okay to notify the patient I did review the CT lung screening that she had.  She does have multiple lung nodules present they seem to be benign and are stable    She has a new 3 mm nodule that is noted in the left upper lobe  Recommend for patient to have a follow-up of CT screening done again in 1 year.

## 2023-11-16 DIAGNOSIS — G89.29 RIGHT FLANK PAIN, CHRONIC: Primary | ICD-10-CM

## 2023-11-16 DIAGNOSIS — R10.9 RIGHT FLANK PAIN, CHRONIC: Primary | ICD-10-CM

## 2023-11-17 ENCOUNTER — TRANSCRIBE ORDERS (OUTPATIENT)
Dept: REGISTRATION | Age: 68
End: 2023-11-17

## 2023-11-17 ENCOUNTER — APPOINTMENT (OUTPATIENT)
Dept: LAB | Age: 68
End: 2023-11-17
Attending: FAMILY MEDICINE
Payer: MEDICARE

## 2023-11-17 DIAGNOSIS — G89.29 OTHER CHRONIC PAIN: ICD-10-CM

## 2023-11-17 DIAGNOSIS — R10.9 UNSPECIFIED ABDOMINAL PAIN: Primary | ICD-10-CM

## 2023-11-17 DIAGNOSIS — R10.9 UNSPECIFIED ABDOMINAL PAIN: ICD-10-CM

## 2023-11-17 LAB
ALBUMIN SERPL-MCNC: 4.4 G/DL (ref 3.5–5.7)
ALP SERPL-CCNC: 68 IU/L (ref 34–125)
ALT SERPL-CCNC: 22 IU/L (ref 7–52)
ANION GAP SERPL CALC-SCNC: 9 MEQ/L (ref 3–15)
AST SERPL-CCNC: 22 IU/L (ref 13–39)
BILIRUB SERPL-MCNC: 0.7 MG/DL (ref 0.3–1.2)
BUN SERPL-MCNC: 11 MG/DL (ref 7–25)
CALCIUM SERPL-MCNC: 9.6 MG/DL (ref 8.6–10.3)
CHLORIDE SERPL-SCNC: 103 MEQ/L (ref 98–107)
CO2 SERPL-SCNC: 27 MEQ/L (ref 21–31)
CREAT SERPL-MCNC: 0.8 MG/DL (ref 0.6–1.2)
CREAT UR-MCNC: 62.3 MG/DL
GFR SERPL CREATININE-BSD FRML MDRD: >60 ML/MIN/1.73M*2
GLUCOSE SERPL-MCNC: 87 MG/DL (ref 70–99)
MICROALBUMIN UR-MCNC: 6.2 MG/L
MICROALBUMIN/CREAT UR: 10 UG/MG
POTASSIUM SERPL-SCNC: 4.7 MEQ/L (ref 3.5–5.1)
PROT SERPL-MCNC: 6.9 G/DL (ref 6–8.2)
SODIUM SERPL-SCNC: 139 MEQ/L (ref 136–145)

## 2023-11-17 PROCEDURE — 36415 COLL VENOUS BLD VENIPUNCTURE: CPT

## 2023-11-17 PROCEDURE — 80053 COMPREHEN METABOLIC PANEL: CPT

## 2023-11-17 PROCEDURE — 82570 ASSAY OF URINE CREATININE: CPT

## 2024-04-01 ENCOUNTER — TELEPHONE (OUTPATIENT)
Dept: PRIMARY CARE | Facility: CLINIC | Age: 69
End: 2024-04-01
Payer: MEDICARE

## 2024-04-01 DIAGNOSIS — Z12.31 BREAST CANCER SCREENING BY MAMMOGRAM: Primary | ICD-10-CM

## 2024-04-09 ENCOUNTER — HOSPITAL ENCOUNTER (OUTPATIENT)
Dept: RADIOLOGY | Age: 69
Discharge: HOME | End: 2024-04-09
Attending: FAMILY MEDICINE
Payer: MEDICARE

## 2024-04-09 ENCOUNTER — TRANSCRIBE ORDERS (OUTPATIENT)
Dept: REGISTRATION | Age: 69
End: 2024-04-09

## 2024-04-09 DIAGNOSIS — Z12.31 ENCOUNTER FOR SCREENING MAMMOGRAM FOR MALIGNANT NEOPLASM OF BREAST: Primary | ICD-10-CM

## 2024-04-09 DIAGNOSIS — Z12.31 ENCOUNTER FOR SCREENING MAMMOGRAM FOR MALIGNANT NEOPLASM OF BREAST: ICD-10-CM

## 2024-04-09 PROCEDURE — 77067 SCR MAMMO BI INCL CAD: CPT

## 2024-05-10 ENCOUNTER — APPOINTMENT (OUTPATIENT)
Dept: LAB | Age: 69
End: 2024-05-10
Attending: INTERNAL MEDICINE
Payer: MEDICARE

## 2024-05-10 ENCOUNTER — TRANSCRIBE ORDERS (OUTPATIENT)
Dept: LAB | Age: 69
End: 2024-05-10

## 2024-05-10 DIAGNOSIS — N18.1 CHRONIC KIDNEY DISEASE, STAGE 1: Primary | ICD-10-CM

## 2024-05-10 DIAGNOSIS — N18.1 CHRONIC KIDNEY DISEASE, STAGE 1: ICD-10-CM

## 2024-05-10 LAB
ALBUMIN SERPL-MCNC: 4.5 G/DL (ref 3.5–5.7)
ANION GAP SERPL CALC-SCNC: 8 MEQ/L (ref 3–15)
BILIRUB UR QL STRIP.AUTO: NEGATIVE MG/DL
BUN SERPL-MCNC: 10 MG/DL (ref 7–25)
CALCIUM SERPL-MCNC: 9.9 MG/DL (ref 8.6–10.3)
CHLORIDE SERPL-SCNC: 101 MEQ/L (ref 98–107)
CLARITY UR REFRACT.AUTO: CLEAR
CO2 SERPL-SCNC: 28 MEQ/L (ref 21–31)
COLOR UR AUTO: YELLOW
CREAT SERPL-MCNC: 0.8 MG/DL (ref 0.6–1.2)
CREAT UR-MCNC: 47.9 MG/DL
EGFRCR SERPLBLD CKD-EPI 2021: >60 ML/MIN/1.73M*2
GLUCOSE SERPL-MCNC: 81 MG/DL (ref 70–99)
GLUCOSE UR STRIP.AUTO-MCNC: NEGATIVE MG/DL
HGB UR QL STRIP.AUTO: NEGATIVE
KETONES UR STRIP.AUTO-MCNC: ABNORMAL MG/DL
LEUKOCYTE ESTERASE UR QL STRIP.AUTO: NEGATIVE
MICROALBUMIN UR-MCNC: 2.6 MG/L
MICROALBUMIN/CREAT UR: 5.4 UG/MG
NITRITE UR QL STRIP.AUTO: NEGATIVE
PH UR STRIP.AUTO: 6 [PH]
PHOSPHATE SERPL-MCNC: 4 MG/DL (ref 2.4–4.7)
POTASSIUM SERPL-SCNC: 4.5 MEQ/L (ref 3.5–5.1)
PROT UR QL STRIP.AUTO: NEGATIVE
SODIUM SERPL-SCNC: 137 MEQ/L (ref 136–145)
SP GR UR REFRACT.AUTO: 1.01
UROBILINOGEN UR STRIP-ACNC: 0.2 EU/DL

## 2024-05-10 PROCEDURE — 82043 UR ALBUMIN QUANTITATIVE: CPT

## 2024-05-10 PROCEDURE — 82570 ASSAY OF URINE CREATININE: CPT

## 2024-05-10 PROCEDURE — 36415 COLL VENOUS BLD VENIPUNCTURE: CPT

## 2024-05-10 PROCEDURE — 80069 RENAL FUNCTION PANEL: CPT

## 2024-05-10 PROCEDURE — 81003 URINALYSIS AUTO W/O SCOPE: CPT

## 2024-08-08 ENCOUNTER — OFFICE VISIT (OUTPATIENT)
Dept: PRIMARY CARE | Facility: CLINIC | Age: 69
End: 2024-08-08
Payer: MEDICARE

## 2024-08-08 VITALS
TEMPERATURE: 97.5 F | OXYGEN SATURATION: 98 % | RESPIRATION RATE: 16 BRPM | WEIGHT: 173.6 LBS | HEIGHT: 66 IN | SYSTOLIC BLOOD PRESSURE: 132 MMHG | DIASTOLIC BLOOD PRESSURE: 86 MMHG | HEART RATE: 73 BPM | BODY MASS INDEX: 27.9 KG/M2

## 2024-08-08 DIAGNOSIS — R74.9 ABNORMAL LEVEL OF SERUM ENZYME: ICD-10-CM

## 2024-08-08 DIAGNOSIS — I10 HYPERTENSION, UNSPECIFIED TYPE: ICD-10-CM

## 2024-08-08 DIAGNOSIS — Z87.891 FORMER SMOKER: ICD-10-CM

## 2024-08-08 DIAGNOSIS — Z74.09 OTHER REDUCED MOBILITY: ICD-10-CM

## 2024-08-08 DIAGNOSIS — Z82.49 FAMILY HISTORY OF CORONARY ARTERY DISEASE IN FATHER: ICD-10-CM

## 2024-08-08 DIAGNOSIS — T78.40XS ALLERGY, SEQUELA: Primary | ICD-10-CM

## 2024-08-08 DIAGNOSIS — R79.83 HOMOCYSTINEMIA: ICD-10-CM

## 2024-08-08 DIAGNOSIS — E55.9 VITAMIN D DEFICIENCY: ICD-10-CM

## 2024-08-08 DIAGNOSIS — J44.9 CHRONIC OBSTRUCTIVE PULMONARY DISEASE, UNSPECIFIED COPD TYPE (CMS/HCC): ICD-10-CM

## 2024-08-08 PROBLEM — E72.11 HYPERHOMOCYSTEINEMIA (CMS/HCC): Status: ACTIVE | Noted: 2024-08-08

## 2024-08-08 PROBLEM — T78.40XA ALLERGIES: Status: ACTIVE | Noted: 2024-08-08

## 2024-08-08 PROCEDURE — G8754 DIAS BP LESS 90: HCPCS | Performed by: FAMILY MEDICINE

## 2024-08-08 PROCEDURE — G8752 SYS BP LESS 140: HCPCS | Performed by: FAMILY MEDICINE

## 2024-08-08 PROCEDURE — 99214 OFFICE O/P EST MOD 30 MIN: CPT | Performed by: FAMILY MEDICINE

## 2024-08-08 ASSESSMENT — ENCOUNTER SYMPTOMS
CHILLS: 0
ACTIVITY CHANGE: 0
DIAPHORESIS: 0
ABDOMINAL PAIN: 1
EYE PAIN: 0
PALPITATIONS: 1
SINUS PRESSURE: 0
COUGH: 0
EYE REDNESS: 0
EYE DISCHARGE: 0
APPETITE CHANGE: 0
UNEXPECTED WEIGHT CHANGE: 1
WHEEZING: 0
VOICE CHANGE: 0
FATIGUE: 0
EYE ITCHING: 0
CHEST TIGHTNESS: 0
SINUS PAIN: 0
SHORTNESS OF BREATH: 0

## 2024-08-08 NOTE — ASSESSMENT & PLAN NOTE
-Pt notes paternal CAD--with elevated level of homocysteine--wouldlike to have follow up with cardio/referred

## 2024-08-08 NOTE — PROGRESS NOTES
"Tamara Valdivia D.O.   [facility] in [address], [address]  [place] [address]  [phone]  Fax [phone]          History of Present Illness   Patient ID: Adeola Rees is a 68 y.o. female.    Subjective F/u Appt.    HPI     69 y/o female pt w/ pmhx of  CKD, 3-time COVID dx, myocarditis, and mild COPD presents for labs only and f/u appt. Pt complains of being unable to lose weight. Pt states she partakes in a lot of physical activity and fitness. Pt is a former smoker and has since been stable on lozenges.    She notes doing breathing/lymphatic exercises, meditation, and yoga stretches. Pt notes bilateral leg swelling, stating her \"ankles go over her shoes\" during the summer. She has had recent travel and noticed sx while on plane. She wears compression anklets and socks, especially while exercising to treat sx.    Pt expresses concern for mold exposure as well. She endorses palpitations, chest pain, vertigo, and flatulence.     She has since f/u with GI and has an Rx to be filled. Upon preparation for colonoscopy, pt noted dehydration and labs done to test for celiac disease. As a result, pt has maintained a gluten-free diet since completion of her colonoscopy.     Per labs performed in 5/24, Pt has CKD stage 1 managed by  Dr. Julien in nephrology. She was advised to start on a low protein diet    Pt is also disinterested in fulfilling immunizations or further medication intake overall, stating she would rather treat her conditions with diet and exercise prior to considering meds. She only expresses interest in completion of labs. Pt expresses interest in consulting w/ a cardiologist.     Past Medical/Surgical/Family/Social History     The following have been reviewed and updated as appropriate in this visit:   Allergies         Past Medical History:   Diagnosis Date    Cancer (CMS/HCC)     skin cancer-basal    Celiac disease     Chemical sensitivity     COPD " (chronic obstructive pulmonary disease) (CMS/HCC)     Depression     Environmental allergies     Fatty liver     Female infertility     H/O gastroesophageal reflux (GERD)     High cholesterol     History of bronchitis     History of sinusitis     History of skin cancer     History of traumatic head injury     Hypertension     Nasal sinus polyp     Osteoarthritis     Osteopenia     Psoriasis     Sleep apnea        Past Surgical History:   Procedure Laterality Date    APPENDECTOMY      APPENDECTOMY  1970    COLONOSCOPY  07/2019    HYSTERECTOMY  1995    OOPHORECTOMY      TONSILLECTOMY      TONSILLECTOMY  1964    UPPER GASTROINTESTINAL ENDOSCOPY  07/2019    WISDOM TOOTH EXTRACTION  1976       Family History   Problem Relation Age of Onset    Hypertension Biological Mother     Obesity Biological Mother     Kidney disease Biological Mother     Hypertension Biological Father     Obesity Biological Father     Heart disease Biological Father     Diabetes Biological Father     Arthritis Biological Father     Lung cancer Biological Sister     Thyroid cancer Biological Sister     Cancer Biological Sister     ADD / ADHD Biological Sister     Hypertension Biological Brother     Eczema Biological Brother     Heart disease Maternal Grandmother     Hypertension Maternal Grandmother     Obesity Maternal Grandmother     Depression Maternal Grandmother     Hypertension Maternal Grandfather     Heart disease Maternal Grandfather     Stroke Maternal Grandfather     Dementia Paternal Grandmother     Diabetes Paternal Grandfather     Cancer Paternal Grandfather        Social History     Socioeconomic History    Marital status:      Spouse name: Cristopher     Number of children: 2    Years of education: Not on file    Highest education level: Not on file   Occupational History    Occupation: homemaker   Tobacco Use    Smoking status: Former     Packs/day: 1.00     Years: 48.00     Additional pack years: 0.00     Total pack years: 48.00      Types: Cigarettes     Start date: 1968     Quit date: 2016     Years since quittin.7     Passive exposure: Past    Smokeless tobacco: Never    Tobacco comments:     Uses lozenges   Vaping Use    Vaping Use: Never used   Substance and Sexual Activity    Alcohol use: Not Currently    Drug use: Never    Sexual activity: Defer   Other Topics Concern    Not on file   Social History Narrative    Lives with her , has two adopted children. (22).     Social Determinants of Health     Financial Resource Strain: Not on file   Food Insecurity: No Food Insecurity (2022)    Hunger Vital Sign     Worried About Running Out of Food in the Last Year: Never true     Ran Out of Food in the Last Year: Never true   Transportation Needs: Not on file   Physical Activity: Not on file   Stress: Not on file   Social Connections: Not on file   Intimate Partner Violence: Not on file   Housing Stability: Not on file      Allergies and Medications     Allergies   Allergen Reactions    Bee Sting [Bee Venom Protein (Honey Bee)] Anaphylaxis    Honey     Shellfish Derived Hives    Sulfa (Sulfonamide Antibiotics) Anaphylaxis    Clindamycin     Gold Au 198 Itching and Other (see comments)     swelling    Nickel Itching and Other (see comments)     swelling    Vancomycin      Kidney failure due to high levels  2022    Ceftin [Cefuroxime Axetil] Other (see comments)     Per patient bone pain    Ciprofibrate     Ciprofloxacin      Other reaction(s): Muscular pain         Current Outpatient Medications:     MAGNESIUM ORAL, Take by mouth., Disp: , Rfl:     nicotine polacrilex (COMMIT) 4 mg lozenge, Apply 4 mg to cheek See admin instr. q 5 hours PRN, Disp: , Rfl:     omega-3-dha-epa-dpa-fish oil 1,050 mg(300 mg -675 mg-75 mg) capsule, Take 1 capsule by mouth daily., Disp: , Rfl:     tretinoin (RETIN-A TOP), Apply topically., Disp: , Rfl:     VIT D3-VIT K2-OLIVE LEAF EXT ORAL, Take 2 drops by mouth 2 (two) times a  "day. 2 drops + 1000 IU Vit d, Disp: , Rfl:    Review of Systems       Review of Systems   Constitutional:  Positive for unexpected weight change (Unable to lose weight w/ exercise). Negative for activity change, appetite change, chills, diaphoresis and fatigue.   HENT:  Negative for congestion, hearing loss, postnasal drip, sinus pressure, sinus pain and voice change.    Eyes:  Negative for pain, discharge, redness, itching and visual disturbance.   Respiratory:  Negative for cough, chest tightness, shortness of breath and wheezing.    Cardiovascular:  Positive for chest pain, palpitations and leg swelling.   Gastrointestinal:  Positive for abdominal pain (RLQ abd pain).   Neurological:         Vertigo       Physical Examination       Objective     Vitals:    08/08/24 1029   BP: 132/86   Pulse:    Resp:    Temp:    SpO2:        Vitals:    08/08/24 0955 08/08/24 1029   BP: (!) 142/90 132/86   BP Location: Left upper arm Left upper arm   Patient Position: Sitting Lying   Pulse: 73    Resp: 16    Temp: 36.4 °C (97.5 °F)    TempSrc: Temporal    SpO2: 98%    Weight: 78.7 kg (173 lb 9.6 oz)    Height: 1.676 m (5' 5.98\")        Wt Readings from Last 3 Encounters:   08/08/24 78.7 kg (173 lb 9.6 oz)   11/07/23 80.3 kg (177 lb)   10/16/23 80.4 kg (177 lb 3.2 oz)       Ht Readings from Last 3 Encounters:   08/08/24 1.676 m (5' 5.98\")   11/07/23 1.676 m (5' 6\")   10/16/23 1.676 m (5' 6\")       BP Readings from Last 3 Encounters:   08/08/24 132/86   10/16/23 128/88   07/18/23 118/74       Physical Exam  Vitals and nursing note reviewed.   Constitutional:       Appearance: Normal appearance.   HENT:      Head: Normocephalic and atraumatic.      Right Ear: External ear normal.      Left Ear: External ear normal.      Nose: Nose normal.      Mouth/Throat:      Mouth: Mucous membranes are moist.   Eyes:      Extraocular Movements: Extraocular movements intact.      Conjunctiva/sclera: Conjunctivae normal.      Pupils: Pupils are " equal, round, and reactive to light.   Neck:      Vascular: No carotid bruit.   Cardiovascular:      Rate and Rhythm: Normal rate and regular rhythm.      Pulses: Normal pulses.      Heart sounds: Normal heart sounds. No murmur heard.     No friction rub. No gallop.   Pulmonary:      Effort: Pulmonary effort is normal. No respiratory distress.      Breath sounds: Normal breath sounds. No stridor. No wheezing, rhonchi or rales.   Musculoskeletal:      Cervical back: Neck supple. No tenderness.   Lymphadenopathy:      Cervical: No cervical adenopathy.   Skin:     General: Skin is warm and dry.      Findings: No erythema or rash.   Neurological:      General: No focal deficit present.      Mental Status: She is alert and oriented to person, place, and time.        Laboratory Results     Lab Results   Component Value Date    WBC 4.90 08/13/2024    HGB 14.7 08/13/2024    HCT 44.3 08/13/2024    MCV 93.1 08/13/2024     08/13/2024          Chemistry        Component Value Date/Time     08/13/2024 0947    K 4.3 08/13/2024 0947     08/13/2024 0947    CO2 26 08/13/2024 0947    BUN 14 08/13/2024 0947    CREATININE 0.8 08/13/2024 0947        Component Value Date/Time    CALCIUM 9.2 08/13/2024 0947    ALKPHOS 68 11/17/2023 1040    AST 22 11/17/2023 1040    ALT 22 11/17/2023 1040    BILITOT 0.7 11/17/2023 1040            Lab Results   Component Value Date    GLUCOSE 87 08/13/2024    CALCIUM 9.2 08/13/2024     08/13/2024    K 4.3 08/13/2024    CO2 26 08/13/2024     08/13/2024    BUN 14 08/13/2024    CREATININE 0.8 08/13/2024       Lab Results   Component Value Date    CHOL 268 (H) 08/13/2024    CHOL 230 (H) 07/18/2023    CHOL 223 (H) 09/12/2020     Lab Results   Component Value Date    HDL 58 08/13/2024    HDL 66 07/18/2023    HDL 47 (L) 09/12/2020     Lab Results   Component Value Date    LDLCALC 180 (H) 08/13/2024    LDLCALC 142 (H) 07/18/2023    LDLCALC 152 (H) 09/12/2020     Lab Results  "  Component Value Date    TRIG 152 (H) 08/13/2024    TRIG 111 07/18/2023    TRIG 118 09/12/2020     No results found for: \"CHOLHDL\"    Lab Results   Component Value Date    TSH 1.93 07/18/2023       Lab Results   Component Value Date    HGBA1C 5.4 07/18/2023       No results found for: \"HEPCAB\"      There is no immunization history for the selected administration types on file for this patient.       Assessment and Plan       Assessment/Plan     Problem List Items Addressed This Visit          Respiratory    Chronic obstructive pulmonary disease, unspecified COPD type (CMS/HCC)    Current Assessment & Plan      currently has condition controlled and managed   Has not had nay new episodes of sob            Circulatory    Hypertension    Current Assessment & Plan     -BP was 132/86 today in office  --no new treatment  -Currently managed by exercise and diet         Relevant Orders    Lipid panel (Completed)    CBC (Completed)    Basic metabolic panel (Completed)       Endocrine/Metabolic    Vitamin D deficiency    Relevant Orders    Vitamin D 1,25-Dihydroxy (Completed)       Mental Health    Former smoker    Current Assessment & Plan     - ct lung screening advised         Relevant Orders    CT LUNG SCREENING WITHOUT IV CONTRAST       Other    Homocystinemia    Current Assessment & Plan     - per pt requested updated labs to be completed          Family history of coronary artery disease in father    Current Assessment & Plan     -Pt notes paternal CAD--with elevated level of homocysteine--wouldlike to have follow up with cardio/referred         Relevant Orders    Ambulatory referral to Cardiology    Allergies - Primary    Current Assessment & Plan     -discussed with pt and due to complaints recommend for pt to allergy test and labs ordered  --pt feels has heavy burden of mold in house and wants testing         Relevant Orders    Alternaria tenuis IgE (Completed)    A. Fumigatus (M3) (Completed)    Cladosporium IgE " (Completed)    Candida albicans IgE (Completed)    Mucor IgE (Completed)    Abnormal level of serum enzyme    Current Assessment & Plan     -pt had labs ordered---Reviewed today in office, labs ordered          Relevant Orders    Homocysteine, serum (Completed)    Other reduced mobility    Relevant Orders    High sensitivity CRP (Completed)       No follow-ups on file.    Orders Placed This Encounter   Procedures    CT LUNG SCREENING WITHOUT IV CONTRAST     Standing Status:   Future     Standing Expiration Date:   8/8/2025     Scheduling Instructions:      Call [phone] to schedule an appointment and meet your Lung Health Navigator.     Order Specific Question:   Does the patient show any signs or symptoms of lung cancer?     Answer:   No     Order Specific Question:   Is this the first (baseline) CT or an annual exam?     Answer:   Annual [2]     Order Specific Question:   Is this a low dose CT or a routine CT?     Answer:   Low Dose CT [1]     Order Specific Question:   Is there documentation of shared decision making?     Answer:   Yes     Order Specific Question:   Specify location patient would like procedure performed     Answer:   [facility]     Order Specific Question:   Release to patient     Answer:   Immediate [1]    Lipid panel     Standing Status:   Future     Number of Occurrences:   1     Standing Expiration Date:   8/8/2025     Order Specific Question:   Release to patient     Answer:   Immediate [1]    CBC     Standing Status:   Future     Number of Occurrences:   1     Standing Expiration Date:   8/8/2025     Order Specific Question:   Release to patient     Answer:   Immediate [1]    Basic metabolic panel     Standing Status:   Future     Number of Occurrences:   1     Standing Expiration Date:   8/8/2025     Order Specific Question:   Release to patient     Answer:   Immediate [1]    Vitamin D 1,25-Dihydroxy     Standing Status:   Future     Number of Occurrences:   1     Standing  Expiration Date:   8/8/2025     Order Specific Question:   Release to patient     Answer:   Immediate [1]    Alternaria tenuis IgE     Standing Status:   Future     Number of Occurrences:   1     Standing Expiration Date:   8/8/2025     Order Specific Question:   Release to patient     Answer:   Immediate [1]    A. Fumigatus (M3)     Standing Status:   Future     Number of Occurrences:   1     Standing Expiration Date:   8/8/2025     Order Specific Question:   Release to patient     Answer:   Immediate [1]    Cladosporium IgE     Standing Status:   Future     Number of Occurrences:   1     Standing Expiration Date:   8/8/2025     Order Specific Question:   Release to patient     Answer:   Immediate [1]    Candida albicans IgE     Standing Status:   Future     Number of Occurrences:   1     Standing Expiration Date:   8/8/2025     Order Specific Question:   Release to patient     Answer:   Immediate [1]    Mucor IgE     Standing Status:   Future     Number of Occurrences:   1     Standing Expiration Date:   8/8/2025     Order Specific Question:   Release to patient     Answer:   Immediate [1]    Homocysteine, serum     Standing Status:   Future     Number of Occurrences:   1     Standing Expiration Date:   8/8/2025     Order Specific Question:   Release to patient     Answer:   Immediate [1]    High sensitivity CRP     Standing Status:   Future     Number of Occurrences:   1     Standing Expiration Date:   8/8/2025     Order Specific Question:   Release to patient     Answer:   Immediate [1]    Ambulatory referral to Cardiology     Standing Status:   Future     Standing Expiration Date:   2/8/2025     Referral Priority:   Routine     Referral Type:   Consultation     Referral Reason:   Specialty Services Required     Referred to Provider:   Tarah Holland MD     Requested Specialty:   Cardiology     Number of Visits Requested:   10            By signing my name below, Kirsten ORTIZ, attest that this  documentation has been prepared under the direction and in the presence of Tamara Valdivia, DO      8/24/2024 3:04 PM      Tamara Valdivia, DO  8/24/2024

## 2024-08-08 NOTE — ASSESSMENT & PLAN NOTE
-discussed with pt and due to complaints recommend for pt to allergy test and labs ordered  --pt feels has heavy burden of mold in house and wants testing

## 2024-08-13 ENCOUNTER — APPOINTMENT (OUTPATIENT)
Dept: LAB | Age: 69
End: 2024-08-13
Attending: FAMILY MEDICINE
Payer: MEDICARE

## 2024-08-13 DIAGNOSIS — T78.40XS ALLERGY, SEQUELA: ICD-10-CM

## 2024-08-13 DIAGNOSIS — R74.9 ABNORMAL LEVEL OF SERUM ENZYME: ICD-10-CM

## 2024-08-13 DIAGNOSIS — Z74.09 OTHER REDUCED MOBILITY: ICD-10-CM

## 2024-08-13 DIAGNOSIS — E72.11 HYPERHOMOCYSTEINEMIA (CMS/HCC): ICD-10-CM

## 2024-08-13 DIAGNOSIS — I10 HYPERTENSION, UNSPECIFIED TYPE: ICD-10-CM

## 2024-08-13 DIAGNOSIS — E55.9 VITAMIN D DEFICIENCY: ICD-10-CM

## 2024-08-13 LAB
ANION GAP SERPL CALC-SCNC: 9 MEQ/L (ref 3–15)
BUN SERPL-MCNC: 14 MG/DL (ref 7–25)
CALCIUM SERPL-MCNC: 9.2 MG/DL (ref 8.6–10.3)
CHLORIDE SERPL-SCNC: 105 MEQ/L (ref 98–107)
CHOLEST SERPL-MCNC: 268 MG/DL
CO2 SERPL-SCNC: 26 MEQ/L (ref 21–31)
CREAT SERPL-MCNC: 0.8 MG/DL (ref 0.6–1.2)
CRP SERPL HS-MCNC: 1.3 MG/L
EGFRCR SERPLBLD CKD-EPI 2021: >60 ML/MIN/1.73M*2
ERYTHROCYTE [DISTWIDTH] IN BLOOD BY AUTOMATED COUNT: 12.2 % (ref 11.7–14.4)
GLUCOSE SERPL-MCNC: 87 MG/DL (ref 70–99)
HCT VFR BLD AUTO: 44.3 % (ref 35–45)
HCYS SERPL-SCNC: 11 UMOL/L (ref 5–15)
HDLC SERPL-MCNC: 58 MG/DL
HDLC SERPL: 4.6 {RATIO}
HGB BLD-MCNC: 14.7 G/DL (ref 11.8–15.7)
LDLC SERPL CALC-MCNC: 180 MG/DL
MCH RBC QN AUTO: 30.9 PG (ref 28–33.2)
MCHC RBC AUTO-ENTMCNC: 33.2 G/DL (ref 32.2–35.5)
MCV RBC AUTO: 93.1 FL (ref 83–98)
NONHDLC SERPL-MCNC: 210 MG/DL
PDW BLD AUTO: 9.6 FL (ref 9.4–12.3)
PLATELET # BLD AUTO: 245 K/UL (ref 150–369)
POTASSIUM SERPL-SCNC: 4.3 MEQ/L (ref 3.5–5.1)
RBC # BLD AUTO: 4.76 M/UL (ref 3.93–5.22)
SODIUM SERPL-SCNC: 140 MEQ/L (ref 136–145)
TRIGL SERPL-MCNC: 152 MG/DL
WBC # BLD AUTO: 4.9 K/UL (ref 3.8–10.5)

## 2024-08-13 PROCEDURE — 86141 C-REACTIVE PROTEIN HS: CPT

## 2024-08-13 PROCEDURE — 86003 ALLG SPEC IGE CRUDE XTRC EA: CPT

## 2024-08-13 PROCEDURE — 86003 ALLG SPEC IGE CRUDE XTRC EA: CPT | Mod: 59

## 2024-08-13 PROCEDURE — 80048 BASIC METABOLIC PNL TOTAL CA: CPT

## 2024-08-13 PROCEDURE — 82652 VIT D 1 25-DIHYDROXY: CPT

## 2024-08-13 PROCEDURE — 83090 ASSAY OF HOMOCYSTEINE: CPT

## 2024-08-13 PROCEDURE — 36415 COLL VENOUS BLD VENIPUNCTURE: CPT

## 2024-08-13 PROCEDURE — 80061 LIPID PANEL: CPT

## 2024-08-13 PROCEDURE — 85027 COMPLETE CBC AUTOMATED: CPT

## 2024-08-15 LAB
A ALTERNATA IGE QN: <0.1
ASPERGILLUS FUMIGATUS ALLERGY (M3): <0.1
C ALBICANS IGE QN: <0.1 KU/L
C HERBARUM IGE QN: <0.1 KU/L
M RACEMOSUS IGE QN: <0.1 KU/L

## 2024-08-17 LAB
1,25(OH)2D SERPL-MCNC: 44 PG/ML (ref 18–72)
1,25(OH)2D2 SERPL-MCNC: <8 PG/ML
1,25(OH)2D3 SERPL-MCNC: 44 PG/ML

## 2024-08-20 NOTE — RESULT ENCOUNTER NOTE
Ok to notify the pt that her allergy panel for mold was negative.  I did review her cholesterol values and they are elevated and her HS-CRP-labs put pt at average risk for cardiac disease.     Homocysteine level was normal. The rest of her labs were ok

## 2024-08-28 ENCOUNTER — TRANSCRIBE ORDERS (OUTPATIENT)
Dept: LAB | Age: 69
End: 2024-08-28

## 2024-08-28 ENCOUNTER — APPOINTMENT (OUTPATIENT)
Dept: LAB | Age: 69
End: 2024-08-28
Attending: PHYSICIAN ASSISTANT
Payer: MEDICARE

## 2024-08-28 DIAGNOSIS — R10.31 RIGHT LOWER QUADRANT PAIN: Primary | ICD-10-CM

## 2024-08-28 DIAGNOSIS — R10.31 RIGHT LOWER QUADRANT PAIN: ICD-10-CM

## 2024-08-28 PROCEDURE — 82784 ASSAY IGA/IGD/IGG/IGM EACH: CPT

## 2024-08-28 PROCEDURE — 36415 COLL VENOUS BLD VENIPUNCTURE: CPT

## 2024-08-28 PROCEDURE — 86364 TISS TRNSGLTMNASE EA IG CLAS: CPT

## 2024-08-29 LAB — IGA SERPL-MCNC: 168 MG/DL (ref 84.5–499)

## 2024-08-30 LAB — TTG IGA SER IA-ACNC: <0.2 ELIA U/ML

## 2024-09-18 ENCOUNTER — HOSPITAL ENCOUNTER (OUTPATIENT)
Facility: CLINIC | Age: 69
Discharge: LEFT BEFORE DISCHARGE | End: 2024-09-18
Payer: MEDICARE

## 2024-09-18 ENCOUNTER — APPOINTMENT (EMERGENCY)
Dept: CARDIOLOGY | Facility: HOSPITAL | Age: 69
End: 2024-09-18
Payer: MEDICARE

## 2024-09-18 ENCOUNTER — HOSPITAL ENCOUNTER (EMERGENCY)
Facility: HOSPITAL | Age: 69
Discharge: HOME | End: 2024-09-18
Attending: EMERGENCY MEDICINE | Admitting: EMERGENCY MEDICINE
Payer: MEDICARE

## 2024-09-18 VITALS
RESPIRATION RATE: 18 BRPM | SYSTOLIC BLOOD PRESSURE: 152 MMHG | TEMPERATURE: 99.2 F | HEART RATE: 90 BPM | DIASTOLIC BLOOD PRESSURE: 88 MMHG | OXYGEN SATURATION: 100 %

## 2024-09-18 DIAGNOSIS — M79.651 RIGHT THIGH PAIN: Primary | ICD-10-CM

## 2024-09-18 PROCEDURE — 93971 EXTREMITY STUDY: CPT | Mod: RT

## 2024-09-18 PROCEDURE — 93971 EXTREMITY STUDY: CPT | Mod: 26,RT | Performed by: SURGERY

## 2024-09-18 PROCEDURE — 99284 EMERGENCY DEPT VISIT MOD MDM: CPT | Mod: 25

## 2024-09-18 ASSESSMENT — ENCOUNTER SYMPTOMS
WOUND: 0
ABDOMINAL PAIN: 0
DIFFICULTY URINATING: 0
NECK PAIN: 0
SHORTNESS OF BREATH: 0
FEVER: 0
BACK PAIN: 0
VOMITING: 0

## 2024-09-18 NOTE — ED PROVIDER NOTES
"Emergency Medicine Note  HPI   HISTORY OF PRESENT ILLNESS         69 y/o female presents for evaluation of right thigh pain x 5 days. States she was in a long car ride on 9/14. States she did \"1000 foot pumps\" to help with her lymphatic circulation as she states she gets swollen often. States afterwards, she developed sharp right medial upper thigh pain. Pain has been persistent since. Pain improved with standing/walking and is worse with laying. Did not take anything for pain. Concerned for DVT. States someone told her it may be varicose veins. No leg trauma. No fevers, chills, CP, SOB, leg swelling, rash, or numbness.          Patient History   PAST HISTORY     Reviewed from Nursing Triage:       Past Medical History:   Diagnosis Date    Cancer (CMS/HCC)     skin cancer-basal    Celiac disease     Chemical sensitivity     COPD (chronic obstructive pulmonary disease) (CMS/HCC)     Depression     Environmental allergies     Fatty liver     Female infertility     H/O gastroesophageal reflux (GERD)     High cholesterol     History of bronchitis     History of sinusitis     History of skin cancer     History of traumatic head injury     Hypertension     Nasal sinus polyp     Osteoarthritis     Osteopenia     Psoriasis     Sleep apnea        Past Surgical History   Procedure Laterality Date    Appendectomy      Appendectomy  1970    Colonoscopy  07/2019    Colonoscopy N/A 6/17/2019    Performed by Mamadou López MD at  GI    EGD N/A 6/17/2019    Performed by Mamadou López MD at  [facility]    Hysterectomy  1995    Oophorectomy      Tonsillectomy      Tonsillectomy  1964    Upper gastrointestinal endoscopy  07/2019    [place] tooth extraction  1976       Family History   Problem Relation Name Age of Onset    Hypertension Biological Mother      Obesity Biological Mother      Kidney disease Biological Mother      Hypertension Biological Father      Obesity Biological Father      Heart disease Biological Father      " Diabetes Biological Father      Arthritis Biological Father      Lung cancer Biological Sister      Thyroid cancer Biological Sister      Cancer Biological Sister      ADD / ADHD Biological Sister      Hypertension Biological Brother      Eczema Biological Brother      Heart disease Maternal Grandmother      Hypertension Maternal Grandmother      Obesity Maternal Grandmother      Depression Maternal Grandmother      Hypertension Maternal Grandfather      Heart disease Maternal Grandfather      Stroke Maternal Grandfather      Dementia Paternal Grandmother      Diabetes Paternal Grandfather      Cancer Paternal Grandfather         Social History     Tobacco Use    Smoking status: Former     Current packs/day: 0.00     Average packs/day: 1 pack/day for 48.8 years (48.8 ttl pk-yrs)     Types: Cigarettes     Start date: 1968     Quit date: 2016     Years since quittin.7     Passive exposure: Past    Smokeless tobacco: Never    Tobacco comments:     Uses lozenges   Vaping Use    Vaping status: Never Used   Substance Use Topics    Alcohol use: Not Currently    Drug use: Never         Review of Systems   REVIEW OF SYSTEMS     Review of Systems   Constitutional:  Negative for fever.   Respiratory:  Negative for shortness of breath.    Cardiovascular:  Negative for chest pain.   Gastrointestinal:  Negative for abdominal pain and vomiting.   Genitourinary:  Negative for difficulty urinating.   Musculoskeletal:  Negative for back pain and neck pain.   Skin:  Negative for rash and wound.   Neurological:  Negative for syncope.         VITALS     ED Vitals      Date/Time Temp Pulse Resp BP SpO2 [facility]   24 1246 37.3 °C (99.2 °F) 97 19 150/83 100 % MAS                         Physical Exam   PHYSICAL EXAM     Physical Exam  Vitals and nursing note reviewed.   Constitutional:       Appearance: Normal appearance.   HENT:      Head: Normocephalic.      Nose: Nose normal.   Eyes:      Conjunctiva/sclera: Conjunctivae  normal.   Cardiovascular:      Rate and Rhythm: Normal rate.   Pulmonary:      Effort: Pulmonary effort is normal.   Musculoskeletal:      Cervical back: Neck supple.      Comments: RLE: no tenderness, no edema, mild tenderness of medial upper thigh, no overlying skin changes, full ROM at hip and knee, no calf tenderness, strong DP pulse, + sensation and ROM of toes  Steady gait   Skin:     General: Skin is warm and dry.   Neurological:      General: No focal deficit present.      Mental Status: She is alert.   Psychiatric:         Mood and Affect: Mood normal.           PROCEDURES     Procedures     DATA     Results       None            Imaging Results              US venous leg right (Preliminary result)  Result time 09/18/24 13:56:55      Preliminary result                   Initial Result:    No sonographic evidence of deep or superficial venous thrombosis in the   examined veins of the right lower extremity.                                      No orders to display       Scoring tools                                  ED Course & MDM   MDM / ED COURSE / CLINICAL IMPRESSION / DISPO     Medical Decision Making  Problems Addressed:  Right thigh pain: acute illness or injury    Amount and/or Complexity of Data Reviewed  External Data Reviewed: notes.  Radiology: ordered and independent interpretation performed. Decision-making details documented in ED Course.           Clinical Impression      Right thigh pain     _________________       ED Disposition   Discharge                       Hiral Nieves PA C  09/18/24 1516

## 2024-09-18 NOTE — DISCHARGE INSTRUCTIONS
Rest and Tylenol as needed  See orthopedics if pain persists  Return to the ER for worsening pain, leg swelling, rash, fevers, numbness, weakness, or any other concerning symptoms

## 2024-09-18 NOTE — ED ATTESTATION NOTE
The patient was evaluated and managed by the physician assistant / nurse practitioner.       Maximiliano Barrios MD  09/18/24 1947

## 2024-09-20 ENCOUNTER — TRANSCRIBE ORDERS (OUTPATIENT)
Dept: SCHEDULING | Age: 69
End: 2024-09-20

## 2024-09-20 DIAGNOSIS — R10.31 RIGHT LOWER QUADRANT PAIN: Primary | ICD-10-CM

## 2024-09-30 ENCOUNTER — HOSPITAL ENCOUNTER (OUTPATIENT)
Dept: RADIOLOGY | Age: 69
Discharge: HOME | End: 2024-09-30
Attending: PHYSICIAN ASSISTANT
Payer: MEDICARE

## 2024-09-30 DIAGNOSIS — R10.31 RIGHT LOWER QUADRANT PAIN: ICD-10-CM

## 2024-09-30 PROCEDURE — 74176 CT ABD & PELVIS W/O CONTRAST: CPT

## 2024-10-23 ENCOUNTER — APPOINTMENT (OUTPATIENT)
Dept: URBAN - METROPOLITAN AREA CLINIC 203 | Age: 69
Setting detail: DERMATOLOGY
End: 2024-10-23

## 2024-10-23 DIAGNOSIS — Z71.89 OTHER SPECIFIED COUNSELING: ICD-10-CM

## 2024-10-23 DIAGNOSIS — Z85.828 PERSONAL HISTORY OF OTHER MALIGNANT NEOPLASM OF SKIN: ICD-10-CM

## 2024-10-23 DIAGNOSIS — L81.4 OTHER MELANIN HYPERPIGMENTATION: ICD-10-CM

## 2024-10-23 DIAGNOSIS — L57.8 OTHER SKIN CHANGES DUE TO CHRONIC EXPOSURE TO NONIONIZING RADIATION: ICD-10-CM

## 2024-10-23 DIAGNOSIS — D18.0 HEMANGIOMA: ICD-10-CM

## 2024-10-23 DIAGNOSIS — D22 MELANOCYTIC NEVI: ICD-10-CM

## 2024-10-23 DIAGNOSIS — B07.8 OTHER VIRAL WARTS: ICD-10-CM

## 2024-10-23 PROBLEM — D22.5 MELANOCYTIC NEVI OF TRUNK: Status: ACTIVE | Noted: 2024-10-23

## 2024-10-23 PROBLEM — D18.01 HEMANGIOMA OF SKIN AND SUBCUTANEOUS TISSUE: Status: ACTIVE | Noted: 2024-10-23

## 2024-10-23 PROCEDURE — OTHER LIQUID NITROGEN: OTHER

## 2024-10-23 PROCEDURE — OTHER ADDITIONAL NOTES: OTHER

## 2024-10-23 PROCEDURE — 99213 OFFICE O/P EST LOW 20 MIN: CPT | Mod: 25

## 2024-10-23 PROCEDURE — OTHER REASSURANCE: OTHER

## 2024-10-23 PROCEDURE — OTHER COUNSELING: OTHER

## 2024-10-23 PROCEDURE — OTHER SUNSCREEN RECOMMENDATIONS: OTHER

## 2024-10-23 PROCEDURE — OTHER PRESCRIPTION MEDICATION MANAGEMENT: OTHER

## 2024-10-23 PROCEDURE — 17110 DESTRUCT B9 LESION 1-14: CPT

## 2024-10-23 ASSESSMENT — LOCATION SIMPLE DESCRIPTION DERM
LOCATION SIMPLE: LEFT UPPER BACK
LOCATION SIMPLE: LEFT FOREHEAD
LOCATION SIMPLE: ABDOMEN
LOCATION SIMPLE: LEFT MIDDLE FINGER
LOCATION SIMPLE: CHEST
LOCATION SIMPLE: RIGHT BACK
LOCATION SIMPLE: INFERIOR FOREHEAD
LOCATION SIMPLE: RIGHT SHOULDER
LOCATION SIMPLE: LEFT BREAST

## 2024-10-23 ASSESSMENT — LOCATION DETAILED DESCRIPTION DERM
LOCATION DETAILED: RIGHT LATERAL SHOULDER
LOCATION DETAILED: RIGHT SUPERIOR LATERAL UPPER BACK
LOCATION DETAILED: LEFT INFERIOR MEDIAL FOREHEAD
LOCATION DETAILED: RIGHT ANTERIOR SHOULDER
LOCATION DETAILED: LEFT DISTAL DORSAL MIDDLE FINGER
LOCATION DETAILED: LEFT MEDIAL BREAST 10-11:00 REGION
LOCATION DETAILED: LEFT SUPERIOR MEDIAL UPPER BACK
LOCATION DETAILED: INFERIOR MID FOREHEAD
LOCATION DETAILED: LEFT MEDIAL BREAST 11-12:00 REGION
LOCATION DETAILED: LEFT RIB CAGE
LOCATION DETAILED: LEFT MEDIAL UPPER BACK
LOCATION DETAILED: MIDDLE STERNUM

## 2024-10-23 ASSESSMENT — LOCATION ZONE DERM
LOCATION ZONE: FINGER
LOCATION ZONE: TRUNK
LOCATION ZONE: ARM
LOCATION ZONE: FACE

## 2024-10-23 NOTE — PROCEDURE: LIQUID NITROGEN
Show Spray Paint Technique Variable?: Yes
Post-Care Instructions: I reviewed with the patient in detail post-care instructions. Patient is to wear sunprotection, and avoid picking at any of the treated lesions. Pt may apply Vaseline to crusted or scabbing areas.
Number Of Freeze-Thaw Cycles: 3 freeze-thaw cycles
Medical Necessity Clause: This procedure was medically necessary because the lesions that were treated were:
Render Post-Care Instructions In Note?: no
Spray Paint Text: The liquid nitrogen was applied to the skin utilizing a spray paint frosting technique.
Medical Necessity Information: It is in your best interest to select a reason for this procedure from the list below. All of these items fulfill various CMS LCD requirements except the new and changing color options.
Pared With?: 15 blade
Consent: The patient's consent was obtained including but not limited to risks of crusting, scabbing, blistering, scarring, darker or lighter pigmentary change, recurrence, incomplete removal and infection.
Detail Level: Detailed

## 2024-10-23 NOTE — PROCEDURE: ADDITIONAL NOTES
Detail Level: Simple
Additional Notes: Clinically resolved at todays visit
Render Risk Assessment In Note?: no

## 2024-10-29 NOTE — PROGRESS NOTES
"     Cardiology  Office Consult Note         Reason for visit:   Chief Complaint   Patient presents with    New Patient Cardiac Eval    Hypertension       HPI     Adeola Rees is a 68 y.o. female who presents to the office for cardiovascular evaluation.    She has a PMH of HTN, HLD, COPD, reported CKD stage 1 (follows with Dr. Julien), Vit D deficiency, and former tobacco use.     She was referred by her PCP for family history of CAD and HLD.  Additionally, patient has concerns about travel and hiking (particularly to [place] as some friends of hers experienced reported MI post returning) and starting aerobics.      His most recent visit with renal was 5/17/24. It is noted that renal u/s from 2023 showed mild cortical scarring of the left kidney. Cr was at baseline. BP was stable at home in the 120s systolic. Follow up due 5/2025.      Labs 8/13/24: , , HDL 58, , BUN 14, Cr 0.8, CRP 1.30     Today, she reports an episode of chest pain while on a short plane ride. She also feels that air gets \"trapped\" and she is able to massage it away so she feels that is caused by a gas bubble in her chest. She can walk considerable distances on flat surfaces without chest discomfort or dyspnea.  However she is concerneda bout activties of more demand than this. She denies orthopnea or PND but does feel like she is choking when she first lays down and does wake up in the middle of the night suddenly. She feels palpitations when her BP is elevated and can feel her heart pounding in the morning. She denies any leg swelling, dizziness, or lightheadedness.     She was recently seen for abdominal pain end of September.  A CT A/P identiifed stool in colon along with diverticulosis changes.  Atelectatic changes vs mild scarring noted in bases of lungs L > R.  Atherosclerotic calcifications along with small fat containing umbilical hernia reported.        Outside records reviewed. Pertinent lab results " reviewed..    Past Medical History:   Diagnosis Date    Aortic atherosclerosis (CMS/HCC)     mild on CT lung scan 2023    Cancer (CMS/HCC)     skin cancer-basal    Celiac disease     Chemical sensitivity     COPD (chronic obstructive pulmonary disease) (CMS/HCC)     Depression     Environmental allergies     Fatty liver     Female infertility     H/O gastroesophageal reflux (GERD)     High cholesterol     History of bronchitis     History of sinusitis     History of skin cancer     History of traumatic head injury     Hypertension     Nasal sinus polyp     Osteoarthritis     Osteopenia     Psoriasis     Sleep apnea        Past Surgical History   Procedure Laterality Date    Appendectomy      Appendectomy  1970    Colonoscopy  2019    Colonoscopy N/A 2019    [facility] at  [facility]    EGD N/A 2019    Performed by [facility] at  [facility]    Hysterectomy      Oophorectomy      Tonsillectomy      Tonsillectomy  1964    Upper gastrointestinal endoscopy  2019    [place] tooth extraction          Social History     Tobacco Use   Smoking Status Former    Current packs/day: 0.00    Average packs/day: 1 pack/day for 48.8 years (48.8 ttl pk-yrs)    Types: Cigarettes    Start date: 1968    Quit date: 2016    Years since quittin.9    Passive exposure: Past   Smokeless Tobacco Never   Tobacco Comments    Uses lozenges     Social History     Tobacco Use    Smoking status: Former     Current packs/day: 0.00     Average packs/day: 1 pack/day for 48.8 years (48.8 ttl pk-yrs)     Types: Cigarettes     Start date: 1968     Quit date: 2016     Years since quittin.9     Passive exposure: Past    Smokeless tobacco: Never    Tobacco comments:     Uses lozenges   Vaping Use    Vaping status: Never Used   Substance Use Topics    Alcohol use: Not Currently    Drug use: Never       Family History   Problem Relation Name Age of Onset    Dementia Biological Mother       Hypertension Biological Mother      Obesity Biological Mother      Kidney disease Biological Mother      Hypertension Biological Father      Obesity Biological Father      Heart disease Biological Father      Diabetes Biological Father      Arthritis Biological Father      Lung cancer Biological Sister      Thyroid cancer Biological Sister      Cancer Biological Sister      ADD / ADHD Biological Sister      Hypertension Biological Brother      Eczema Biological Brother      Heart disease Maternal Grandmother      Hypertension Maternal Grandmother      Obesity Maternal Grandmother      Depression Maternal Grandmother      Hypertension Maternal Grandfather      Heart disease Maternal Grandfather      Stroke Maternal Grandfather      Dementia Paternal Grandmother      Diabetes Paternal Grandfather      Cancer Paternal Grandfather         Allergies:  Bee sting [bee venom protein (honey bee)], Honey, Shellfish derived, Sulfa (sulfonamide antibiotics), Clindamycin, Gold au 198, Nickel, Vancomycin, Ceftin [cefuroxime axetil], Ciprofibrate, and Ciprofloxacin    Current Outpatient Medications   Medication Sig Dispense Refill    MAGNESIUM ORAL Take 250 mg by mouth 2 (two) times a day.      nicotine polacrilex (COMMIT) 4 mg lozenge Apply 4 mg to cheek See admin instr. q 5 hours PRN      tretinoin (RETIN-A TOP) Apply topically.      VIT D3-VIT K2-OLIVE LEAF EXT ORAL Take 2 drops by mouth 2 (two) times a day. 2 drops + 1000 IU Vit d       No current facility-administered medications for this visit.       Review of Systems   Cardiovascular:  Positive for chest pain, dyspnea on exertion and palpitations. Negative for leg swelling, orthopnea and paroxysmal nocturnal dyspnea.   Neurological:  Negative for dizziness and light-headedness.       Objective     Vitals:    10/30/24 1019   BP: (!) 140/100   Pulse: 80   SpO2: 98%       Wt Readings from Last 1 Encounters:   10/30/24 79.4 kg (175 lb)       Body mass index is 28.25  kg/m².    Physical Exam  Constitutional:       Appearance: Normal appearance.   Neck:      Vascular: No carotid bruit, hepatojugular reflux or JVD.   Cardiovascular:      Rate and Rhythm: Normal rate and regular rhythm.      Pulses: Normal pulses.           Carotid pulses are 2+ on the right side and 2+ on the left side.     Heart sounds: No murmur heard.     Comments: No Carotid nor abdominal bruit noted. No RV lift palpated.  No LE edema palptated but some TTP of anterior.  Pulmonary:      Effort: Pulmonary effort is normal. No respiratory distress.      Breath sounds: Normal breath sounds. No stridor. No wheezing, rhonchi or rales.   Musculoskeletal:         General: No swelling.   Skin:     General: Skin is warm and dry.   Neurological:      Mental Status: She is alert and oriented to person, place, and time.   Psychiatric:      Comments: Anxious affect.  Speech is rapid; many concerns beyond chest pain/ CV tolerance including whether she needs pulmonologist regarding CT findings.  Patient redirects well.         ECG   Normal sinus rhythm  Normal ECG  When compared with ECG of 21-AUG-2022 00:24,  No significant change was found       Hematology  Lab Results   Component Value Date    WBC 4.90 08/13/2024    HGB 14.7 08/13/2024    HCT 44.3 08/13/2024     08/13/2024       Chemistries  Lab Results   Component Value Date     08/13/2024    K 4.3 08/13/2024     08/13/2024    CREATININE 0.8 08/13/2024    BUN 14 08/13/2024    CO2 26 08/13/2024    GLUCOSE 87 08/13/2024    CALCIUM 9.2 08/13/2024    ALT 22 11/17/2023    AST 22 11/17/2023       Cholesterol  Lab Results   Component Value Date    CHOL 268 (H) 08/13/2024    TRIG 152 (H) 08/13/2024    HDL 58 08/13/2024    LDLCALC 180 (H) 08/13/2024    NONHDLCALC 210 08/13/2024       Endocrine  Lab Results   Component Value Date    TSH 1.93 07/18/2023    FREET4 0.99 01/05/2023    HGBA1C 5.4 07/18/2023       Assessment/Plan     Atypical chest pain  Pain  description is atypical to non-cardiac by description; no associated symptoms and no pain with exertion.  Quit smoking 2016  HTN here grade 1  No diabetes known  FLP 8/2025: , , LDL c 180, HDLc 58  10 year ASCVD risk: 8.3%  -- Given patient concern regardoing functional status, will order ETT (patient declines imaging regardless)  -- will reassess BP and primary prevention particularly with ASCVD risk score and elevated LDL-c pending stress test.  -- no HF findings or ECG findings to assess currently for structural abnormalities on TTE        Hypertension  Grade 1/2 HTN (140/100)  Not on any anti-HTN  Normal BP in clinic visit 8/2024  -- possibly WC htn;  -- no increased voltage on EKG  -- will reassess during ETT; need more data point if really HTN.      Mixed hyperlipidemia  FLP 8/2025: , , LDL c 180, HDLc 58  10 year ASCVD risk: 8.3%  Quit tobacco use 2016 but still with nicotine lozenge  No DM2  + FH  Walks extensively  -- Clarify risk further with ETT as above.    -- work on decrease nicotine use  -- review statin (HMG coenzyme reductase inhibitor) for assistance with lower your LDL cholesterol to improve cardiovascular risk prevention given 10 year ASCVD risk and magnitude of LDL-c  -- CACS will not be helpful for stratification / LDL-c target in light of absolute value currently.  -- reviewed dietary intervention ; patient reports she is already following    -- f/u post completion of stress test to clarify global CV risk (I.e. presence / absence of obstructive ASCAD, determine exercise prescriptions / allowances based on ETT and primary prevention).      No orders of the defined types were placed in this encounter.      Medications Discontinued During This Encounter   Medication Reason    omega-3-dha-epa-dpa-fish oil 1,050 mg(300 mg -675 mg-75 mg) capsule Therapy completed       Orders Placed This Encounter   Procedures    [facility] MUSE ECG 12 lead (clinic performed)     Scheduling  Instructions:      PLEASE USE THIS ORDER FOR ECG'S PERFORMED IN PHYSICIAN OFFICES     Order Specific Question:   Release to patient     Answer:   Immediate [1]       Follow Up Plans:  Return in about 3 months (around 1/30/2025).   65 minutes spent reviewing records, evaluating patient, developing plan of care, documentation.         I, Yahaira Win, am scribing for, and in the presence of, Jarad Carolina.    I, Jarad Carolina, personally performed the services described in this documentation as scribed by Yahaira Win in my presence, and it is both accurate and complete.     Jarad Carolina MD  11/3/2024

## 2024-10-30 ENCOUNTER — OFFICE VISIT (OUTPATIENT)
Dept: CARDIOLOGY | Facility: CLINIC | Age: 69
End: 2024-10-30
Payer: MEDICARE

## 2024-10-30 VITALS
SYSTOLIC BLOOD PRESSURE: 140 MMHG | HEIGHT: 66 IN | BODY MASS INDEX: 28.12 KG/M2 | WEIGHT: 175 LBS | OXYGEN SATURATION: 98 % | HEART RATE: 80 BPM | DIASTOLIC BLOOD PRESSURE: 100 MMHG

## 2024-10-30 DIAGNOSIS — I10 PRIMARY HYPERTENSION: Primary | ICD-10-CM

## 2024-10-30 DIAGNOSIS — R07.89 ATYPICAL CHEST PAIN: ICD-10-CM

## 2024-10-30 DIAGNOSIS — E78.2 MIXED HYPERLIPIDEMIA: ICD-10-CM

## 2024-10-30 LAB
ATRIAL RATE: 80
P AXIS: 22
PR INTERVAL: 144
QRS DURATION: 76
QT INTERVAL: 390
QTC CALCULATION(BAZETT): 449
R AXIS: 46
T WAVE AXIS: 50
VENTRICULAR RATE: 80

## 2024-10-30 PROCEDURE — G8755 DIAS BP > OR = 90: HCPCS | Performed by: INTERNAL MEDICINE

## 2024-10-30 PROCEDURE — G8753 SYS BP > OR = 140: HCPCS | Performed by: INTERNAL MEDICINE

## 2024-10-30 PROCEDURE — 93000 ELECTROCARDIOGRAM COMPLETE: CPT | Performed by: INTERNAL MEDICINE

## 2024-10-30 PROCEDURE — 99205 OFFICE O/P NEW HI 60 MIN: CPT | Performed by: INTERNAL MEDICINE

## 2024-10-30 ASSESSMENT — ENCOUNTER SYMPTOMS
PND: 0
DYSPNEA ON EXERTION: 1
DIZZINESS: 0
PALPITATIONS: 1
ORTHOPNEA: 0
LIGHT-HEADEDNESS: 0

## 2024-10-30 NOTE — PATIENT INSTRUCTIONS
1) Complete exercise treadmill stress test  2) review statin (HMG coenzyme reductase inhibitor) for assistance with lower your LDL cholesterol to improve cardiovascular risk prevention  3) work on decreasing nicotine use.    4) f/u 3 months.

## 2024-10-31 ENCOUNTER — TELEPHONE (OUTPATIENT)
Dept: SCHEDULING | Facility: CLINIC | Age: 69
End: 2024-10-31
Payer: MEDICARE

## 2024-10-31 NOTE — TELEPHONE ENCOUNTER
Pt calling to clarify why she can not have the exercise stress test sooner than her 3 month f/u. Made pt aware the Dr needs to be in the room for the stress test, pt would like a call back with clarification.    P:[phone]

## 2024-11-03 PROBLEM — E78.2 MIXED HYPERLIPIDEMIA: Status: ACTIVE | Noted: 2024-11-03

## 2024-11-03 PROBLEM — R07.89 ATYPICAL CHEST PAIN: Status: ACTIVE | Noted: 2024-11-03

## 2024-11-03 NOTE — ASSESSMENT & PLAN NOTE
Pain description is atypical to non-cardiac by description; no associated symptoms and no pain with exertion.  Quit smoking 2016  HTN here grade 1  No diabetes known  FLP 8/2025: , , LDL c 180, HDLc 58  10 year ASCVD risk: 8.3%  -- Given patient concern regardoing functional status, will order ETT (patient declines imaging regardless)  -- will reassess BP and primary prevention particularly with ASCVD risk score and elevated LDL-c pending stress test.  -- no HF findings or ECG findings to assess currently for structural abnormalities on TTE

## 2024-11-03 NOTE — ASSESSMENT & PLAN NOTE
Grade 1/2 HTN (140/100)  Not on any anti-HTN  Normal BP in clinic visit 8/2024  -- possibly WC htn;  -- no increased voltage on EKG  -- will reassess during ETT; need more data point if really HTN.

## 2024-11-03 NOTE — ASSESSMENT & PLAN NOTE
FLP 8/2025: , , LDL c 180, HDLc 58  10 year ASCVD risk: 8.3%  Quit tobacco use 2016 but still with nicotine lozenge  No DM2  + FH  Walks extensively  -- Clarify risk further with ETT as above.    -- work on decrease nicotine use  -- review statin (HMG coenzyme reductase inhibitor) for assistance with lower your LDL cholesterol to improve cardiovascular risk prevention given 10 year ASCVD risk and magnitude of LDL-c  -- CACS will not be helpful for stratification / LDL-c target in light of absolute value currently.  -- reviewed dietary intervention ; patient reports she is already following    -- f/u post completion of stress test to clarify global CV risk (I.e. presence / absence of obstructive ASCAD, determine exercise prescriptions / allowances based on ETT and primary prevention).

## 2024-11-12 ENCOUNTER — TELEPHONE (OUTPATIENT)
Dept: PULMONOLOGY | Facility: HOSPITAL | Age: 69
End: 2024-11-12
Payer: MEDICARE

## 2024-11-12 VITALS — BODY MASS INDEX: 28.12 KG/M2 | WEIGHT: 175 LBS | HEIGHT: 66 IN

## 2024-11-20 ENCOUNTER — HOSPITAL ENCOUNTER (OUTPATIENT)
Dept: RADIOLOGY | Age: 69
Discharge: HOME | End: 2024-11-20
Attending: FAMILY MEDICINE
Payer: MEDICARE

## 2024-11-20 DIAGNOSIS — Z87.891 FORMER SMOKER: ICD-10-CM

## 2024-11-20 PROCEDURE — 71271 CT THORAX LUNG CANCER SCR C-: CPT

## 2024-11-21 NOTE — RESULT ENCOUNTER NOTE
Okay to notify Adeola that I did review her CT of her lung.  She still has 3 pulmonary nodules that really have not changed in size and are stable there is a suggestion of mild emphysema that is occurring.  I would like her to have a follow-up with the pulmonologist if she has not.  I do use Dr. Acuna's group    She does have some mild calcifications at the aorta pulmonary artery is not enlarged she does not have any abnormal looking lymph nodes.  There is no active lung disease.  Recommend to repeat the low-dose lung screening in 1 year as a follow-up.

## 2024-12-09 NOTE — PROGRESS NOTES
"     Cardiology  Office Progress Note         Reason for visit: No chief complaint on file.      HPI     Adeola Rees is a 69 y.o. female who presents to the office for cardiovascular follow up and management of HTN, HLD, and chest pain. She also has a PMH of COPD and CKD stage 1 (follows with Dr. Julien).     She was last seen in the office on 10/30/24.  She could walk long distances without chest pain or dyspnea, but was concerned about more strenuous activities. /110 with no increased voltage on EKG. She was to have an exercise stress test and decrease nicotine use. We discussed starting a statin and she would follow up in 3 months.     CT lung screening 11/20/24 showed stable pulmonary nodules and mild calcifications of aorta and coronary arteries. Mild centrolobular emphysematous changes noted.      She had an exercise stress test in the office today.  She was ble to exercise for 6'56\" in modified Vishnu protocol achieving 2.5 MPH and 12% incline. Peak  bpm.  No chest pain but fatigue prompting termination of exercise.  No claudications symptoms reported.     BP response physiologic. 110/88 increased to 168/62    Past Medical History:   Diagnosis Date    Aortic atherosclerosis (CMS/HCC)     mild on CT lung scan 11/2023    Cancer (CMS/HCC)     skin cancer-basal    Celiac disease     Chemical sensitivity     COPD (chronic obstructive pulmonary disease) (CMS/HCC)     Depression     Environmental allergies     Fatty liver     Female infertility     H/O gastroesophageal reflux (GERD)     High cholesterol     History of bronchitis     History of sinusitis     History of skin cancer     History of traumatic head injury     Hypertension     Nasal sinus polyp     Osteoarthritis     Osteopenia     Psoriasis     Sleep apnea        Past Surgical History   Procedure Laterality Date    Appendectomy      Appendectomy  1970    Colonoscopy  07/2019    Colonoscopy N/A 6/17/2019    Performed by Mamadou López " MD [facility] at  [facility]    EGD N/A 2019    Performed by [facility]pez MD at  [facility]    Hysterectomy      Oophorectomy      Tonsillectomy      Tonsillectomy  1964    Upper gastrointestinal endoscopy  2019    [place] tooth extraction         Social History     Tobacco Use    Smoking status: Former     Current packs/day: 0.00     Average packs/day: 1 pack/day for 48.8 years (48.8 ttl pk-yrs)     Types: Cigarettes     Start date: 1968     Quit date: 2016     Years since quittin.0     Passive exposure: Past    Smokeless tobacco: Never    Tobacco comments:     Uses lozenges   Vaping Use    Vaping status: Never Used   Substance Use Topics    Alcohol use: Not Currently    Drug use: Never       Family History   Problem Relation Name Age of Onset    Dementia Biological Mother      Hypertension Biological Mother      Obesity Biological Mother      Kidney disease Biological Mother      Hypertension Biological Father      Obesity Biological Father      Heart disease Biological Father      Diabetes Biological Father      Arthritis Biological Father      Lung cancer Biological Sister Fiona     Thyroid cancer Biological Sister Fiona     Cancer Biological Sister Fiona     ADD / ADHD Biological Sister Fiona     Colon cancer Biological Sister Becka         metastatic    Hypertension Biological Brother      Eczema Biological Brother      Heart disease Maternal Grandmother      Hypertension Maternal Grandmother      Obesity Maternal Grandmother      Depression Maternal Grandmother      Hypertension Maternal Grandfather      Heart disease Maternal Grandfather      Stroke Maternal Grandfather      Dementia Paternal Grandmother      Diabetes Paternal Grandfather      Cancer Paternal Grandfather         Allergies:  Bee sting [bee venom protein (honey bee)], Honey, Shellfish derived, Sulfa (sulfonamide antibiotics), Clindamycin, Gold au 198, Nickel, Vancomycin, Ceftin [cefuroxime axetil], Ciprofibrate, and  Ciprofloxacin    Current Outpatient Medications   Medication Sig Dispense Refill    MAGNESIUM ORAL Take 250 mg by mouth 2 (two) times a day.      nicotine polacrilex (COMMIT) 4 mg lozenge Apply 4 mg to cheek See admin instr. q 5 hours PRN      tretinoin (RETIN-A TOP) Apply topically.      VIT D3-VIT K2-OLIVE LEAF EXT ORAL Take 2 drops by mouth 2 (two) times a day. 2 drops + 1000 IU Vit d       No current facility-administered medications for this visit.       ROS    Objective     There were no vitals filed for this visit.    Wt Readings from Last 5 Encounters:   12/11/24 79.5 kg (175 lb 3.2 oz)   11/12/24 79.4 kg (175 lb)   10/30/24 79.4 kg (175 lb)   08/08/24 78.7 kg (173 lb 9.6 oz)   11/07/23 80.3 kg (177 lb)       There is no height or weight on file to calculate BMI.    Physical Exam   Constitutional:       Appearance: Normal appearance.   Neck:      Vascular: No carotid bruit, hepatojugular reflux or JVD.   Cardiovascular:      Rate and Rhythm: Normal rate and regular rhythm.      Pulses: Normal pulses.           Carotid pulses are 2+ on the right side and 2+ on the left side.     Heart sounds: No murmur heard.     Comments: No Carotid nor abdominal bruit noted. No RV lift palpated.  No LE edema palptated but some TTP of anterior.  Pulmonary:      Effort: Pulmonary effort is normal. No respiratory distress. Approriate recovery after exercise.       Breath sounds: Normal breath sounds. No stridor. No wheezing, rhonchi or rales.   Musculoskeletal:         General: No swelling.   Skin:     General: Skin is warm and dry.   Neurological:      Mental Status: She is alert and oriented to person, place, and time.   Psychiatric:      Comments: Anxious affect previously seen is no longer present.  No features of rapid speech as seen previously.      ECG   sinus rhythm see stress test.      Hematology  Lab Results   Component Value Date    WBC 4.90 08/13/2024    HGB 14.7 08/13/2024    HCT 44.3 08/13/2024      "08/13/2024       Chemistries  Lab Results   Component Value Date     08/13/2024    K 4.3 08/13/2024     08/13/2024    CREATININE 0.8 08/13/2024    BUN 14 08/13/2024    CO2 26 08/13/2024    GLUCOSE 87 08/13/2024    CALCIUM 9.2 08/13/2024    ALT 22 11/17/2023    AST 22 11/17/2023       Cholesterol  Lab Results   Component Value Date    CHOL 268 (H) 08/13/2024    TRIG 152 (H) 08/13/2024    HDL 58 08/13/2024    LDLCALC 180 (H) 08/13/2024    NONHDLCALC 210 08/13/2024       Endocrine  Lab Results   Component Value Date    TSH 1.93 07/18/2023    FREET4 0.99 01/05/2023    HGBA1C 5.4 07/18/2023               Most recent stress test results:    STRESS TEST ONLY, EXERCISE 12/11/2024 (Final) 12/11/2024    Interpretation Summary    Stress Findings: An exercise stress test was performed following a modified Vishnu protocol. The patient demonstrated fair exercise capacity. The patient reported no symptoms during the stress test. The patient reached stage 3. The patient experienced fatigue. Blood pressure and heart rate demonstrated a physiologic response to exercise. The patient experienced no angina calculating an angina index of 0. No chest pain or shortness of breath nor claudication provoked.        Assessment/Plan     Atypical chest pain  Previously: Pain description is atypical to non-cardiac by description; no associated symptoms and no pain with exertion.  Quit smoking 2016  HTN here grade 1  No diabetes known  FLP 8/2025: , , LDL c 180, HDLc 58  10 year ASCVD risk: 8.3%  ETT today:  6'56\" in modified Vishnu protocol achieving 2.5 MPH and 12% incline. Peak  bpm.  No chest pain but fatigue prompting termination of exercise.  No claudications symptoms reported.   BP response physiologic.  > 168.        -- Given normal ETT, okay to exercise aggressively.  Recommended heart rate zone 100 130 bpm for some anaerobic exercise.  -- Will repeat lipid panel in 6 months with LP(a) and if remains " elevated despite exercise dietary measures, will recommend statin at that point  -- no HF findings or ECG findings to assess currently for structural abnormalities on TTE        Hypertension  Grade 1/2 HTN (140/100)  Not on any anti-HTN  Normal BP in clinic visit 8/2024  Normotensive during resting ECG for stress with appropriate blood pressure rise (no hypertensive response)  --Continue to follow; no pharmacologic intervention currently required      Mixed hyperlipidemia  FLP 8/2025: , , LDL c 180, HDLc 58  10 year ASCVD risk: 8.3%  Quit tobacco use 2016 but still with nicotine lozenge  No DM2  + FH  Walks extensively  Good exercise response on ETT with no ischemic changes  -- work on decrease nicotine use  -- Patient is resistant to pharmacologic intervention currently and wants to aggressively pursue exercise measures given ETT and dietary intervention  -- CACS will not be helpful for stratification / LDL-c target in light of absolute value currently.  -- Will repeat lipid panel in 6 months and reassess at that time with discussion as appropriate for assessing statin intervention or other pharmacological invention as necessary if dietary and exercise measures to do not reduce risk adequately.      No orders of the defined types were placed in this encounter.      There are no discontinued medications.    Orders Placed This Encounter   Procedures    Lipid panel     Standing Status:   Future     Standing Expiration Date:   12/11/2025     Order Specific Question:   Release to patient     Answer:   Immediate     Order Specific Question:   Release to patient     Answer:   Immediate [1]    Lipoprotein (a)     Standing Status:   Future     Standing Expiration Date:   12/11/2025     Order Specific Question:   Release to patient     Answer:   Immediate     Order Specific Question:   Release to patient     Answer:   Immediate [1]       Follow Up Plans:  Return in about 6 months (around 6/11/2025).   68 minutes  spent in supervision of ETT, evaluation, interpretation and review of ETT results with patient, examining patient, develop plan of care, documentation.       I, Yahaira Win, am scribing for, and in the presence of, Jarad Carolina.    I, Jarad Carolina, personally performed the services described in this documentation as scribed by Yahaira Win in my presence, and it is both accurate and complete.       Jarad Carolina MD  12/16/2024

## 2024-12-11 ENCOUNTER — OFFICE VISIT (OUTPATIENT)
Dept: CARDIOLOGY | Facility: CLINIC | Age: 69
End: 2024-12-11
Payer: MEDICARE

## 2024-12-11 ENCOUNTER — HOSPITAL ENCOUNTER (OUTPATIENT)
Dept: CARDIOLOGY | Facility: CLINIC | Age: 69
Discharge: HOME | End: 2024-12-11
Attending: INTERNAL MEDICINE
Payer: MEDICARE

## 2024-12-11 VITALS — BODY MASS INDEX: 28.16 KG/M2 | HEIGHT: 66 IN | WEIGHT: 175.2 LBS

## 2024-12-11 DIAGNOSIS — I10 PRIMARY HYPERTENSION: ICD-10-CM

## 2024-12-11 DIAGNOSIS — R07.89 ATYPICAL CHEST PAIN: ICD-10-CM

## 2024-12-11 DIAGNOSIS — E78.5 HYPERLIPIDEMIA, UNSPECIFIED HYPERLIPIDEMIA TYPE: Primary | ICD-10-CM

## 2024-12-11 DIAGNOSIS — E78.2 MIXED HYPERLIPIDEMIA: ICD-10-CM

## 2024-12-11 LAB
STRESS BASELINE BP: NORMAL MMHG
STRESS BASELINE HR: 84 BPM
STRESS PERCENT HR: 101 %
STRESS POST ESTIMATED WORKLOAD: 6.7 METS
STRESS POST EXERCISE DUR MIN: 6 MIN
STRESS POST EXERCISE DUR SEC: 56 SEC
STRESS POST PEAK BP: NORMAL MMHG
STRESS POST PEAK HR: 155 BPM
STRESS TARGET HR: 130 BPM

## 2024-12-11 PROCEDURE — G8756 NO BP MEASURE DOC: HCPCS | Performed by: INTERNAL MEDICINE

## 2024-12-11 PROCEDURE — 93015 CV STRESS TEST SUPVJ I&R: CPT | Performed by: INTERNAL MEDICINE

## 2024-12-11 PROCEDURE — 99215 OFFICE O/P EST HI 40 MIN: CPT | Mod: 25 | Performed by: INTERNAL MEDICINE

## 2024-12-11 NOTE — PATIENT INSTRUCTIONS
1) ok to exercise.  Would focus on target -130 based on exercise treadmill test  2) continue dietary measures along with exercise discussed last visit  3) repeat lipid panel in 6 months ; if remains elevated above target, will review statin medications again.

## 2024-12-11 NOTE — ASSESSMENT & PLAN NOTE
"Previously: Pain description is atypical to non-cardiac by description; no associated symptoms and no pain with exertion.  Quit smoking 2016  HTN here grade 1  No diabetes known  FLP 8/2025: , , LDL c 180, HDLc 58  10 year ASCVD risk: 8.3%  ETT today:  6'56\" in modified Vishnu protocol achieving 2.5 MPH and 12% incline. Peak  bpm.  No chest pain but fatigue prompting termination of exercise.  No claudications symptoms reported.   BP response physiologic.  > 168.        -- Given normal ETT, okay to exercise aggressively.  Recommended heart rate zone 100 130 bpm for some anaerobic exercise.  -- Will repeat lipid panel in 6 months with LP(a) and if remains elevated despite exercise dietary measures, will recommend statin at that point  -- no HF findings or ECG findings to assess currently for structural abnormalities on TTE      " Mastoid Interpolation Flap Division And Inset Text: Division and inset of the mastoid interpolation flap was performed to achieve optimal aesthetic result, restore normal anatomic appearance and avoid distortion of normal anatomy, expedite and facilitate wound healing, achieve optimal functional result and because linear closure either not possible or would produce suboptimal result. The patient was prepped and draped in the usual manner. The pedicle was infiltrated with local anesthesia. The pedicle was sectioned with a #15 blade. The pedicle was de-bulked and trimmed to match the shape of the defect. Hemostasis was achieved. The flap donor site and free margin of the flap were secured with deep buried sutures and the wound edges were re-approximated.

## 2024-12-16 NOTE — ASSESSMENT & PLAN NOTE
FLP 8/2025: , , LDL c 180, HDLc 58  10 year ASCVD risk: 8.3%  Quit tobacco use 2016 but still with nicotine lozenge  No DM2  + FH  Walks extensively  Good exercise response on ETT with no ischemic changes  -- work on decrease nicotine use  -- Patient is resistant to pharmacologic intervention currently and wants to aggressively pursue exercise measures given ETT and dietary intervention  -- CACS will not be helpful for stratification / LDL-c target in light of absolute value currently.  -- Will repeat lipid panel in 6 months and reassess at that time with discussion as appropriate for assessing statin intervention or other pharmacological invention as necessary if dietary and exercise measures to do not reduce risk adequately.

## 2024-12-16 NOTE — ASSESSMENT & PLAN NOTE
Grade 1/2 HTN (140/100)  Not on any anti-HTN  Normal BP in clinic visit 8/2024  Normotensive during resting ECG for stress with appropriate blood pressure rise (no hypertensive response)  --Continue to follow; no pharmacologic intervention currently required

## 2025-02-05 ENCOUNTER — TELEPHONE (OUTPATIENT)
Dept: PRIMARY CARE | Facility: CLINIC | Age: 70
End: 2025-02-05
Payer: MEDICARE

## 2025-02-05 NOTE — TELEPHONE ENCOUNTER
Dr. Jeb Quesada called on pts behalf. States he sent over a medical consult to us this am, but wanted to touch base with pcp regarding allergies.     Dr. Quesada stated she told him her allergies and it seemed like a lot so he wanted to confirm with Dr. Jeffery specifically.     His office number- [phone]  His cell number- [phone]

## 2025-06-16 ENCOUNTER — APPOINTMENT (OUTPATIENT)
Dept: LAB | Age: 70
End: 2025-06-16
Attending: INTERNAL MEDICINE
Payer: MEDICARE

## 2025-06-16 DIAGNOSIS — E78.5 HYPERLIPIDEMIA, UNSPECIFIED HYPERLIPIDEMIA TYPE: ICD-10-CM

## 2025-06-16 LAB
CHOLEST SERPL-MCNC: 235 MG/DL
HDLC SERPL-MCNC: 58 MG/DL
HDLC SERPL: 4.1 {RATIO}
LDLC SERPL CALC-MCNC: 155 MG/DL
NONHDLC SERPL-MCNC: 177 MG/DL
TRIGL SERPL-MCNC: 112 MG/DL

## 2025-06-16 PROCEDURE — 36415 COLL VENOUS BLD VENIPUNCTURE: CPT

## 2025-06-16 PROCEDURE — 80061 LIPID PANEL: CPT

## 2025-06-16 PROCEDURE — 83695 ASSAY OF LIPOPROTEIN(A): CPT

## 2025-06-16 NOTE — PROGRESS NOTES
Cardiology  Office Progress Note         Reason for visit:   Chief Complaint   Patient presents with    Follow-up       HPI     Adeolatriny Rees is a 69 y.o. female who presents to the office for cardiovascular follow up and management of HTN, HLD, and chest pain. She also has a PMH of COPD and CKD stage 1 (follows with Dr. Julien).    She was last seen in the office on 12/11/24 for a visit and stress test. Her stress test was normal and she was cleared for strenuous exercise. No changes made to medications. She was to have labs prior to follow up in 6 months.     Labs 6/16/25: , , HDL 58, , Lp(a) 182    Today, she reports she is still getting chest pains that resolve with belching. This is unchanged from her last visit. She feels lightheadedness and presyncope after having a bowel movement. She denies syncope. She has lower extremity edema when it is humid or when she is on her feet for prolonged periods. She denies any shortness of breath or palpitations.  She has not been checking her BP at home regularly. She uses her elliptical three times per day doing one mile at a time. She does ten minuets at a time.         Patient Agreeable to REGINALDO?  yes              Past Medical History:   Diagnosis Date    Aortic atherosclerosis (CMS/HCC)     mild on CT lung scan 11/2023    Cancer (CMS/HCC)     skin cancer-basal    Celiac disease     Chemical sensitivity     COPD (chronic obstructive pulmonary disease) (CMS/HCC)     Depression     Environmental allergies     Fatty liver     Female infertility     H/O gastroesophageal reflux (GERD)     High cholesterol     History of bronchitis     History of sinusitis     History of skin cancer     History of traumatic head injury     Hypertension     Nasal sinus polyp     Osteoarthritis     Osteopenia     Psoriasis     Sleep apnea        Past Surgical History   Procedure Laterality Date    Appendectomy      Appendectomy  1970    Colonoscopy  07/2019     Colonoscopy N/A 2019    Performed by Mamadou López MD at  [facility]    EGD N/A 2019    Performed by [facility]ad[facility] L[facility]pez MD at  [facility]    Hysterectomy  1995    Oophorectomy      Tonsillectomy      Tonsillectomy  1964    Upper gastrointestinal endoscopy  2019    [place] tooth extraction         Social History     Tobacco Use    Smoking status: Former     Current packs/day: 0.00     Average packs/day: 1 pack/day for 48.8 years (48.8 ttl pk-yrs)     Types: Cigarettes     Start date: 1968     Quit date: 2016     Years since quittin.5     Passive exposure: Past    Smokeless tobacco: Never    Tobacco comments:     Uses lozenges   Vaping Use    Vaping status: Never Used   Substance Use Topics    Alcohol use: Not Currently    Drug use: Never       Family History   Problem Relation Name Age of Onset    Dementia Biological Mother      Hypertension Biological Mother      Obesity Biological Mother      Kidney disease Biological Mother      Hypertension Biological Father      Obesity Biological Father      Heart disease Biological Father      Diabetes Biological Father      Arthritis Biological Father      Lung cancer Biological Sister Fiona     Thyroid cancer Biological Sister Fiona     Cancer Biological Sister Fiona     ADD / ADHD Biological Sister Fiona     Colon cancer Biological Sister Becka         metastatic    Hypertension Biological Brother      Eczema Biological Brother      Heart disease Maternal Grandmother      Hypertension Maternal Grandmother      Obesity Maternal Grandmother      Depression Maternal Grandmother      Hypertension Maternal Grandfather      Heart disease Maternal Grandfather      Stroke Maternal Grandfather      Dementia Paternal Grandmother      Diabetes Paternal Grandfather      Cancer Paternal Grandfather         Allergies:  Bee sting [bee venom protein (honey bee)], Honey, Shellfish derived, Sulfa (sulfonamide antibiotics), Clindamycin, Gold au 198, Nickel, Vancomycin,  Ceftin [cefuroxime axetil], Ciprofibrate, and Ciprofloxacin    Current Outpatient Medications   Medication Sig Dispense Refill    FISH OIL-omega-3 fatty acids (FISH OIL) 340-1,000 mg capsule Take 1 capsule by mouth 2 (two) times a day.      MAGNESIUM ORAL Take 250 mg by mouth 2 (two) times a day.      nicotine polacrilex (COMMIT) 4 mg lozenge Apply 4 mg to cheek See admin instr. q 5 hours PRN      tretinoin (RETIN-A TOP) Apply topically.      VIT D3-VIT K2-OLIVE LEAF EXT ORAL Take 2 drops by mouth 2 (two) times a day. 2 drops + 1000 IU Vit d       No current facility-administered medications for this visit.       Review of Systems   Cardiovascular:  Positive for chest pain and near-syncope. Negative for dyspnea on exertion, leg swelling, palpitations and syncope.   Neurological:  Positive for dizziness and light-headedness.       Objective     Vitals:    06/18/25 1130   BP: (!) 134/100   Pulse: 82   SpO2: 95%       Wt Readings from Last 5 Encounters:   06/18/25 79.8 kg (176 lb)   12/11/24 79.5 kg (175 lb 3.2 oz)   11/12/24 79.4 kg (175 lb)   10/30/24 79.4 kg (175 lb)   08/08/24 78.7 kg (173 lb 9.6 oz)       Body mass index is 28.41 kg/m².    Physical Exam  Constitutional:       Appearance: Normal appearance.   Neck:      Vascular: No carotid bruit, hepatojugular reflux or JVD.   Cardiovascular:      Rate and Rhythm: Normal rate and regular rhythm.      Pulses: Normal pulses.           Carotid pulses are 2+ on the right side and 2+ on the left side.     Heart sounds: No murmur heard.     Comments: No Carotid nor abdominal bruit noted. No RV lift palpated.  No LE edema palptated but some TTP of anterior.  Pulmonary:      Effort: Pulmonary effort is normal. No respiratory distress.      Breath sounds: Normal breath sounds. No stridor. No wheezing, rhonchi or rales.   Musculoskeletal:         General: No swelling.   Skin:     General: Skin is warm and dry.   Neurological:      Mental Status: She is alert and oriented to  person, place, and time.         ECG   Normal sinus rhythm  Right axis deviation  Abnormal ECG  When compared with ECG of 30-OCT-2024 10:27,  QRS axis Shifted right     Hematology  Lab Results   Component Value Date    WBC 4.90 08/13/2024    HGB 14.7 08/13/2024    HCT 44.3 08/13/2024     08/13/2024       Chemistries  Lab Results   Component Value Date     08/13/2024    K 4.3 08/13/2024     08/13/2024    CREATININE 0.8 08/13/2024    BUN 14 08/13/2024    CO2 26 08/13/2024    GLUCOSE 87 08/13/2024    CALCIUM 9.2 08/13/2024    ALT 22 11/17/2023    AST 22 11/17/2023       Cholesterol  Lab Results   Component Value Date    CHOL 235 (H) 06/16/2025    TRIG 112 06/16/2025    HDL 58 06/16/2025    LDLCALC 155 (H) 06/16/2025    NONHDLCALC 177 06/16/2025       Endocrine  Lab Results   Component Value Date    TSH 1.93 07/18/2023    FREET4 0.99 01/05/2023    HGBA1C 5.4 07/18/2023               Most recent stress test results:    STRESS TEST ONLY, EXERCISE 12/11/2024 (Final) 12/11/2024    Interpretation Summary    Stress Findings: An exercise stress test was performed following a modified Vishnu protocol. The patient demonstrated fair exercise capacity. The patient reported no symptoms during the stress test. The patient reached stage 3. The patient experienced fatigue. Blood pressure and heart rate demonstrated a physiologic response to exercise. The patient experienced no angina calculating an angina index of 0. No chest pain or shortness of breath nor claudication provoked.        Assessment/Plan   Assessment & Plan  1. Hyperlipidemia:  - Lipid panel from 06/16/2025: total cholesterol 235, triglycerides 112, HDL-C 58, LDL-C 155, lipoprotein A 182.  Patient aversed to medical intervention due to concern for liver/kindey issues.  - Continue current exercise regimen (3 miles/day, 10 minutes/mile).  - Dietary modifications: include fish oil and sardines.  - Repeat lipid panel in 6 months and will revisit  statin/other pharmacologic intervention if remains elevated versus discuss nonpharmacological interventions including red yeast rice/was over trial/coenzyme every 10.  Again patient is averse to medication intervention  - Start aspirin 81 mg daily      2. Hypertension:  - Diastolic blood pressure 134/100 during visit.  Repeat blood pressure 128/75  - EKG showed normal voltage and normal blood pressure.  - Monitor home blood pressure log.  - Consider pharmacologic intervention if blood pressure remains elevated.    3. Atypical chest pain:  - Resolved.  - No further cardiovascular evaluation needed.    4. Medication management:  - Start baby aspirin once a week.  - Monitor for adverse effects such as bleeding.  - Discontinue aspirin and notify clinic if bleeding occurs.    Risks, benefits, and alternatives of treatment were discussed, including the use of aspirin versus Plavix for antiplatelet therapy. Aspirin was chosen due to patient preference and potential adverse effects of Plavix on the liver.    Follow-up:  - Follow up in 6 months.      No problem-specific Assessment & Plan notes found for this encounter.      New Medications Ordered This Visit   Medications    FISH OIL-omega-3 fatty acids (FISH OIL) 340-1,000 mg capsule     Sig: Take 1 capsule by mouth 2 (two) times a day.       There are no discontinued medications.    Orders Placed This Encounter   Procedures    Lipid panel     Standing Status:   Future     Expected Date:   12/18/2025     Expiration Date:   6/18/2026     Release to patient:   Immediate     Release to patient:   Immediate [1]    Lipoprotein (a)     Standing Status:   Future     Expected Date:   12/18/2025     Expiration Date:   6/18/2026     Release to patient:   Immediate     Release to patient:   Immediate [1]    Apolipoprotein B     Standing Status:   Future     Expected Date:   12/18/2025     Expiration Date:   6/18/2026     Release to patient:   Immediate [1]    [facility] MUSE ECG 12 lead  (clinic performed)     Scheduling Instructions:      PLEASE USE THIS ORDER FOR ECG'S PERFORMED IN PHYSICIAN OFFICES     Release to patient:   Immediate [1]       Follow Up Plans:  Return in about 6 months (around 12/18/2025).   I attest that this visit supports the complexity inherent to evaluation and management associated with medical care services that serve as the continuing focal point for all needed health care services and/or medical care services that are part of ongoing care related to this patient’s single, serious, or complex condition.          I, Yahaira Win, am scribing for, and in the presence of, Jarad Carolina.    I, Jarad Carolina, personally performed the services described in this documentation as scribed by Yahaira Win in my presence, and it is both accurate and complete.     I attest that this visit supports the complexity inherent to evaluation and management associated with medical care services that serve as the continuing focal point for all needed health care services and/or medical care services that are part of ongoing care related to this patient´s single, serious, or complex condition.    Jarad Carolina MD  6/18/2025

## 2025-06-17 LAB — LPA SERPL-SCNC: 182 NMOL/L

## 2025-06-18 ENCOUNTER — OFFICE VISIT (OUTPATIENT)
Dept: CARDIOLOGY | Facility: CLINIC | Age: 70
End: 2025-06-18
Payer: MEDICARE

## 2025-06-18 VITALS
OXYGEN SATURATION: 95 % | WEIGHT: 176 LBS | HEART RATE: 82 BPM | SYSTOLIC BLOOD PRESSURE: 134 MMHG | HEIGHT: 66 IN | DIASTOLIC BLOOD PRESSURE: 100 MMHG | BODY MASS INDEX: 28.28 KG/M2

## 2025-06-18 DIAGNOSIS — R07.89 ATYPICAL CHEST PAIN: ICD-10-CM

## 2025-06-18 DIAGNOSIS — E78.2 MIXED HYPERLIPIDEMIA: ICD-10-CM

## 2025-06-18 DIAGNOSIS — I10 PRIMARY HYPERTENSION: Primary | ICD-10-CM

## 2025-06-18 LAB
ATRIAL RATE: 73
PR INTERVAL: 168
QRS DURATION: 76
QT INTERVAL: 392
QTC CALCULATION(BAZETT): 431
R AXIS: 158
T WAVE AXIS: 166
VENTRICULAR RATE: 73

## 2025-06-18 PROCEDURE — G2211 COMPLEX E/M VISIT ADD ON: HCPCS | Performed by: INTERNAL MEDICINE

## 2025-06-18 PROCEDURE — 99214 OFFICE O/P EST MOD 30 MIN: CPT | Performed by: INTERNAL MEDICINE

## 2025-06-18 PROCEDURE — 93000 ELECTROCARDIOGRAM COMPLETE: CPT | Performed by: INTERNAL MEDICINE

## 2025-06-18 PROCEDURE — G8755 DIAS BP > OR = 90: HCPCS | Performed by: INTERNAL MEDICINE

## 2025-06-18 PROCEDURE — G8752 SYS BP LESS 140: HCPCS | Performed by: INTERNAL MEDICINE

## 2025-06-18 ASSESSMENT — ENCOUNTER SYMPTOMS
LIGHT-HEADEDNESS: 1
PALPITATIONS: 0
NEAR-SYNCOPE: 1
DYSPNEA ON EXERTION: 0
DIZZINESS: 1
SYNCOPE: 0

## 2025-06-18 NOTE — PATIENT INSTRUCTIONS
1) consider trial of Aspirin (baby dose) 81 mg start once a week.  2) continue exercise  3) repeat lipid panel in 6 months.

## 2025-07-23 SDOH — ECONOMIC STABILITY: FOOD INSECURITY: WITHIN THE PAST 12 MONTHS, YOU WORRIED THAT YOUR FOOD WOULD RUN OUT BEFORE YOU GOT MONEY TO BUY MORE.: NEVER TRUE

## 2025-07-23 SDOH — ECONOMIC STABILITY: INCOME INSECURITY: IN THE LAST 12 MONTHS, WAS THERE A TIME WHEN YOU WERE NOT ABLE TO PAY THE MORTGAGE OR RENT ON TIME?: NO

## 2025-07-23 SDOH — ECONOMIC STABILITY: FOOD INSECURITY: WITHIN THE PAST 12 MONTHS, THE FOOD YOU BOUGHT JUST DIDN'T LAST AND YOU DIDN'T HAVE MONEY TO GET MORE.: NEVER TRUE

## 2025-07-23 SDOH — ECONOMIC STABILITY: TRANSPORTATION INSECURITY
IN THE PAST 12 MONTHS, HAS LACK OF TRANSPORTATION KEPT YOU FROM MEETINGS, WORK, OR FROM GETTING THINGS NEEDED FOR DAILY LIVING?: NO

## 2025-07-23 SDOH — ECONOMIC STABILITY: TRANSPORTATION INSECURITY
IN THE PAST 12 MONTHS, HAS THE LACK OF TRANSPORTATION KEPT YOU FROM MEDICAL APPOINTMENTS OR FROM GETTING MEDICATIONS?: NO

## 2025-07-23 ASSESSMENT — SOCIAL DETERMINANTS OF HEALTH (SDOH): IN THE PAST 12 MONTHS, HAS THE ELECTRIC, GAS, OIL, OR WATER COMPANY THREATENED TO SHUT OFF SERVICE IN YOUR HOME?: NO

## 2025-07-24 ENCOUNTER — OFFICE VISIT (OUTPATIENT)
Dept: PRIMARY CARE | Facility: CLINIC | Age: 70
End: 2025-07-24
Payer: MEDICARE

## 2025-07-24 VITALS
DIASTOLIC BLOOD PRESSURE: 84 MMHG | SYSTOLIC BLOOD PRESSURE: 126 MMHG | TEMPERATURE: 97.5 F | HEIGHT: 64 IN | OXYGEN SATURATION: 99 % | WEIGHT: 173.4 LBS | HEART RATE: 89 BPM | RESPIRATION RATE: 16 BRPM | BODY MASS INDEX: 29.6 KG/M2

## 2025-07-24 DIAGNOSIS — Z12.31 BREAST CANCER SCREENING BY MAMMOGRAM: ICD-10-CM

## 2025-07-24 DIAGNOSIS — Z78.0 ENCOUNTER FOR OSTEOPOROSIS SCREENING IN ASYMPTOMATIC POSTMENOPAUSAL PATIENT: ICD-10-CM

## 2025-07-24 DIAGNOSIS — N18.1 STAGE 1 CHRONIC KIDNEY DISEASE: ICD-10-CM

## 2025-07-24 DIAGNOSIS — E55.9 VITAMIN D DEFICIENCY: ICD-10-CM

## 2025-07-24 DIAGNOSIS — Z13.820 ENCOUNTER FOR OSTEOPOROSIS SCREENING IN ASYMPTOMATIC POSTMENOPAUSAL PATIENT: ICD-10-CM

## 2025-07-24 DIAGNOSIS — Z00.00 ENCOUNTER FOR ANNUAL WELLNESS EXAM IN MEDICARE PATIENT: Primary | ICD-10-CM

## 2025-07-24 DIAGNOSIS — E53.8 B12 DEFICIENCY: ICD-10-CM

## 2025-07-24 DIAGNOSIS — Z11.59 NEED FOR HEPATITIS C SCREENING TEST: ICD-10-CM

## 2025-07-24 PROCEDURE — G0439 PPPS, SUBSEQ VISIT: HCPCS | Performed by: FAMILY MEDICINE

## 2025-07-24 PROCEDURE — 1124F ACP DISCUSS-NO DSCNMKR DOCD: CPT | Performed by: FAMILY MEDICINE

## 2025-07-24 RX ORDER — ASPIRIN 81 MG/1
81 TABLET ORAL DAILY
COMMUNITY

## 2025-07-24 ASSESSMENT — ENCOUNTER SYMPTOMS
SLEEP DISTURBANCE: 0
ABDOMINAL PAIN: 0
SHORTNESS OF BREATH: 0
NERVOUS/ANXIOUS: 0
PALPITATIONS: 0
COUGH: 0
BRUISES/BLEEDS EASILY: 0
DIARRHEA: 0
UNEXPECTED WEIGHT CHANGE: 0
FLANK PAIN: 0
SINUS PAIN: 0
APPETITE CHANGE: 0
CONSTIPATION: 0
FREQUENCY: 0
DIFFICULTY URINATING: 0
AGITATION: 0
WEAKNESS: 0
NUMBNESS: 0
LIGHT-HEADEDNESS: 0
WOUND: 0
BACK PAIN: 0
HEADACHES: 0
JOINT SWELLING: 0
VOMITING: 0
MYALGIAS: 0
CHEST TIGHTNESS: 0
ACTIVITY CHANGE: 0
HYPERACTIVE: 0
ABDOMINAL DISTENTION: 0
ARTHRALGIAS: 0
DIZZINESS: 0
VOICE CHANGE: 0
DYSURIA: 0
COLOR CHANGE: 0
BLOOD IN STOOL: 0

## 2025-07-24 ASSESSMENT — PATIENT HEALTH QUESTIONNAIRE - PHQ9: SUM OF ALL RESPONSES TO PHQ9 QUESTIONS 1 & 2: 0

## 2025-07-24 ASSESSMENT — MINI COG
TOTAL SCORE: 1
COMPLETED: YES
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
## 2025-07-26 ENCOUNTER — APPOINTMENT (OUTPATIENT)
Dept: LAB | Age: 70
End: 2025-07-26
Attending: FAMILY MEDICINE
Payer: MEDICARE

## 2025-07-28 ENCOUNTER — APPOINTMENT (OUTPATIENT)
Dept: LAB | Age: 70
End: 2025-07-28
Attending: FAMILY MEDICINE
Payer: MEDICARE

## 2025-07-28 DIAGNOSIS — N18.1 CHRONIC KIDNEY DISEASE, STAGE I: ICD-10-CM

## 2025-07-28 DIAGNOSIS — Z00.00 ROUTINE GENERAL MEDICAL EXAMINATION AT A HEALTH CARE FACILITY: ICD-10-CM

## 2025-07-28 LAB
BILIRUB UR QL STRIP.AUTO: NEGATIVE MG/DL
CLARITY UR REFRACT.AUTO: CLEAR
COLOR UR AUTO: YELLOW
GLUCOSE UR STRIP.AUTO-MCNC: NEGATIVE MG/DL
HGB UR QL STRIP.AUTO: NEGATIVE
KETONES UR STRIP.AUTO-MCNC: NEGATIVE MG/DL
LEUKOCYTE ESTERASE UR QL STRIP.AUTO: NEGATIVE
NITRITE UR QL STRIP.AUTO: NEGATIVE
PH UR STRIP.AUTO: 7 [PH]
PROT UR QL STRIP.AUTO: NEGATIVE
SP GR UR REFRACT.AUTO: 1.01
UROBILINOGEN UR STRIP-ACNC: 0.2 EU/DL

## 2025-07-28 PROCEDURE — 81003 URINALYSIS AUTO W/O SCOPE: CPT | Performed by: FAMILY MEDICINE

## 2025-07-31 DIAGNOSIS — R76.8 HEPATITIS C ANTIBODY DETECTED: ICD-10-CM

## 2025-07-31 DIAGNOSIS — K76.0 FATTY LIVER: Primary | ICD-10-CM

## 2025-08-02 ENCOUNTER — APPOINTMENT (OUTPATIENT)
Dept: LAB | Age: 70
End: 2025-08-02
Attending: FAMILY MEDICINE
Payer: MEDICARE

## 2025-08-02 DIAGNOSIS — R76.8 HEPATITIS C ANTIBODY DETECTED: ICD-10-CM

## 2025-08-02 PROCEDURE — 36415 COLL VENOUS BLD VENIPUNCTURE: CPT

## 2025-08-02 PROCEDURE — 87522 HEPATITIS C REVRS TRNSCRPJ: CPT

## 2025-08-02 NOTE — PROGRESS NOTES
Subjective   AMAURY    Adeola Rees is a 69 y.o. female who presents for a subsequent annual wellness visit.     Patient Care Team:  Tamara Valdivia DO as PCP - General (Family Medicine)  Roxann Walsh, RRT as Respiratory Therapist (Pulmonary)  Freedom Garces MD as Surgeon (General Surgery)      -Patient did not wish to discuss or was unable to provide information about advance care planning  Comprehensive Medical and Social History  Patient Active Problem List   Diagnosis    Left leg pain    Left leg numbness    Bite, insect    Constipation    Abdominal bloating    Fecal impaction (CMS/HCC)    Headache, chronic daily    Memory loss    Well adult health check    Fatty liver    Screening for lipid disorders    Screening for diabetes mellitus (DM)    Smoker    Hypertension    Chronic bilateral low back pain with bilateral sciatica    Vitamin D deficiency    B12 deficiency    Chronic right lower quadrant pain    Localized edema    Hair loss    Homocystinemia    Acute pain of left shoulder    Former smoker    Abscess of right buttock    Exposure to COVID-19 virus    Rash    Abdominal discomfort, generalized    History of multiple allergies    Chronic obstructive pulmonary disease, unspecified COPD type (CMS/HCC)    Right flank pain, chronic    Family history of coronary artery disease in father    Allergies    Abnormal level of serum enzyme    Hyperhomocysteinemia (CMS/HCC)    Other reduced mobility    Atypical chest pain    Mixed hyperlipidemia    Encounter for annual wellness exam in Medicare patient    Stage 1 chronic kidney disease     Past Medical History:   Diagnosis Date    Aortic atherosclerosis (CMS/HCC)     mild on CT lung scan 11/2023    Cancer (CMS/HCC)     skin cancer-basal    Celiac disease     Chemical sensitivity     COPD (chronic obstructive pulmonary disease) (CMS/HCC)     Depression     Environmental allergies     Fatty liver     Female infertility     H/O gastroesophageal reflux  (GERD)     High cholesterol     History of bronchitis     History of sinusitis     History of skin cancer     History of traumatic head injury     Hypertension     Nasal sinus polyp     Osteoarthritis     Osteopenia     Psoriasis     Sleep apnea      Past Surgical History   Procedure Laterality Date    Appendectomy      Appendectomy  1970    Colonoscopy  07/2019    Colonoscopy N/A 6/17/2019    Performed by Mamadou L[facility]pez MD at  [facility]    EGD N/A 6/17/2019    Performed by Mamadou L[facility]pez MD at  [facility]    Hysterectomy  1995    Oophorectomy      Tonsillectomy      Tonsillectomy  1964    Upper gastrointestinal endoscopy  07/2019    [place] tooth extraction  1976     Allergies   Allergen Reactions    Bee Sting [Bee Venom Protein (Honey Bee)] Anaphylaxis    Honey     Shellfish Derived Hives    Sulfa (Sulfonamide Antibiotics) Anaphylaxis    Clindamycin     Gold Au 198 Itching and Other (see comments)     swelling    Nickel Itching and Other (see comments)     swelling    Vancomycin      Kidney failure due to high levels  August 2022    Ceftin [Cefuroxime Axetil] Other (see comments)     Per patient bone pain    Ciprofibrate     Ciprofloxacin      Other reaction(s): Muscular pain     Current Outpatient Medications   Medication Sig Dispense Refill    aspirin 81 mg enteric coated tablet Take 81 mg by mouth daily.      MAGNESIUM ORAL Take 250 mg by mouth 2 (two) times a day.      nicotine polacrilex (COMMIT) 4 mg lozenge Apply 4 mg to cheek See admin instr. q 5 hours PRN      NOT IN DATABASE tudca      tretinoin (RETIN-A TOP) Apply topically.      VIT D3-VIT K2-OLIVE LEAF EXT ORAL Take 2 drops by mouth 2 (two) times a day. 2 drops + 1000 IU Vit d      FISH OIL-omega-3 fatty acids (FISH OIL) 340-1,000 mg capsule Take 1 capsule by mouth 2 (two) times a day. (Patient not taking: Reported on 7/24/2025)       No current facility-administered medications for this visit.     Social History     Tobacco Use    Smoking status: Former     " Current packs/day: 0.00     Average packs/day: 1 pack/day for 48.8 years (48.8 ttl pk-yrs)     Types: Cigarettes     Start date: 1968     Quit date: 2016     Years since quittin.6     Passive exposure: Past    Smokeless tobacco: Never    Tobacco comments:     Uses lozenges   Vaping Use    Vaping status: Never Used   Substance Use Topics    Alcohol use: Not Currently    Drug use: Never     Family History   Problem Relation Name Age of Onset    Dementia Biological Mother      Hypertension Biological Mother      Obesity Biological Mother      Kidney disease Biological Mother      Hypertension Biological Father      Obesity Biological Father      Heart disease Biological Father      Diabetes Biological Father      Arthritis Biological Father      Lung cancer Biological Sister Fiona     Thyroid cancer Biological Sister Fiona     Cancer Biological Sister Fiona     ADD / ADHD Biological Sister Fiona     Colon cancer Biological Sister Bceka         metastatic    Hypertension Biological Brother      Eczema Biological Brother      Heart disease Maternal Grandmother      Hypertension Maternal Grandmother      Obesity Maternal Grandmother      Depression Maternal Grandmother      Hypertension Maternal Grandfather      Heart disease Maternal Grandfather      Stroke Maternal Grandfather      Dementia Paternal Grandmother      Diabetes Paternal Grandfather      Cancer Paternal Grandfather         Objective   Vitals  Vitals:    25 0819   BP: 126/84   BP Location: Right upper arm   Patient Position: Sitting   Pulse: 89   Resp: 16   Temp: 36.4 °C (97.5 °F)   TempSrc: Temporal   SpO2: 99%   Weight: 78.7 kg (173 lb 6.4 oz)   Height: 1.63 m (5' 4.17\")     Body mass index is 29.6 kg/m².    Advanced Care Plan  Does patient have advance directive?: Yes       Patient has Advance Directive: Advance Directive in EMR                             PHQ  Will the patient answer the depression questions?: Yes   Little " interest or pleasure in doing things: Not at all   Feeling down, depressed, or hopeless: Not at all   Depression Risk: 0                                             Mini Cog  Completed: Yes  Score: 1  Result: Positive      Get Up and Go  Result: Pass    STEADI Falls Risk  One or more falls in the last year: No           Has trouble stepping up onto a curb: Yes   Advised to use a cane or walker to get around safely: No   Often has to rush to the toilet: No   Feels unsteady when walking: No   Has lost some feeling in feet: Yes   Often feels sad or depressed: No   Steadies self on furniture while walking at home: No   Takes medication that makes him/her feel lightheaded or more tired than usual: No   Worried about falling: No   Takes medicine to sleep or improve mood: No   Needs to push with hands when rising from a chair: Yes   Falls screen completed: Yes     Hearing and Vision Screening  No results found.  See HRA for relevant hearing screening response.    Diet and Exercise            Assessment/Plan   Diagnoses and all orders for this visit:    Encounter for annual wellness exam in Medicare patient (Primary)  Assessment & Plan:  Normal Exam.  Updated interval history.  Reviewed medications, allergies, PMH,FH,alcohol/tobacco/drug use.    -Age appropriate health Maintenance reviewed.    -Immunizations - UTD    Colonoscopy - Refused    GYN Exam and Mammogram ordered.   -DEXA Scan ordered     -Routine lab panel was ordered.   -Recommend referral for routine eye exam with ophthalmology      Orders:  -     Comprehensive metabolic panel; Future  -     TSH; Future  -     CBC; Future  -     Urinalysis (clean catch) Lab Collect    Stage 1 chronic kidney disease  Assessment & Plan:  Old nephro retired, will see new one 09/2025  Ordered labs    Orders:  -     Microalbumin / creatinine urine ratio; Future  -     Urinalysis (clean catch) Lab Collect    Breast cancer screening by mammogram  -     BI SCREENING MAMMOGRAM  BILATERAL(TOMOSYNTHESIS); Future    Vitamin D deficiency  Assessment & Plan:  Normal values  Will check updated labs    Orders:  -     Vitamin D 25 hydroxy; Future    Encounter for osteoporosis screening in asymptomatic postmenopausal patient  -     DEXA BONE DENSITY; Future    B12 deficiency  Assessment & Plan:  Will check updated labs    Orders:  -     Vitamin B12; Future    Need for hepatitis C screening test  -     Hepatitis C antibody; Future        See Patient Instructions (the written plan) which was given to the patient for PPPS and health risk factors with interventions.

## 2025-08-02 NOTE — PATIENT INSTRUCTIONS
Your Personalized Prevention Plan Services (PPPS)    Preventive Services Checklist (Assumes Average Risk Unless Otherwise Noted):    Health Maintenance Topics with due status: Overdue       Topic Date Due    Breast Cancer Screening 04/09/2025     Health Maintenance Topics with due status: Not Due       Topic Last Completion Date    Colorectal Cancer Screening 06/17/2019    DEXA Scan 08/16/2023    Lung Cancer Screening 11/20/2024    Medicare Annual Wellness Visit 07/24/2025    Depression Screening 07/24/2025    Falls Risk Screening 07/24/2025     Health Maintenance Topics with due status: Completed       Topic Last Completion Date    Hepatitis C Screening 07/26/2025     Health Maintenance Topics with due status: Aged Out       Topic Date Due    Meningococcal ACWY Aged Out    RSV <20 months Aged Out    HIB Vaccines Aged Out    Hepatitis B Vaccines Aged Out    IPV Vaccines Aged Out    Meningococcal B Aged Out    HPV Vaccines Aged Out     Health Maintenance Topics with due status: Discontinued       Topic Date Due    DTaP, Tdap, and Td Vaccines Discontinued    Zoster Vaccine Discontinued    RSV Vaccine Discontinued    Influenza Vaccine Discontinued    Pneumococcal (50 years of age and older) Discontinued    COVID-19 Vaccine Discontinued       You May Be Eligible for These Additional Preventive Services   (Assumes Average Risk Unless Otherwise Noted)  Diabetes Screening Any 1 risk factor: hypertension, dyslipidemia, obesity, high glucose; or Any 2 risk factors: >=64yo, overweight, family history diabetes (covered every 6 months)   Hepatitis C Screening Any 1 risk factor: 1) blood transfusion before 1992,   2) current or past injection drug use (annually for high risk; if born between [birth year]-85[birth year], see above for status).   Vaccine: Hepatitis B As necessary if at-risk: hemophilia, ESRD, diabetes, living with individual infected with hep B, healthcare worker with frequent contact with blood/bodily fluids  (series covered once)   Sexually Transmitted Diseases (STDs) As necessary chlamydia, gonorrhea, syphilis, hepatitis B (covered annually)  HIV if any 1 risk factor present: 1) <14yo or >66yo and at increased risk or 2) 15-66yo and ask for it (covered annually)   Lung Cancer Screening Low dose chest CT if all three risk factors: 1) 50-78yo, 2) smoker or quit within last 15y, 3) >=20 pack years (covered annually).  Results for orders placed during the hospital encounter of 11/20/24    CT LUNG SCREENING WITHOUT IV CONTRAST  2:22 PM       Cholesterol Screening Both risk factors: 1) >=19yo and 2)  increased risk coronary artery disease (covered every 5 years).   Breast Cancer Screening Covered once 35-38yo, annually >=41yo (if >=51yo, see above for status).       Health Risk Factors with Personalized Education:  ----------------------------------------------------------------------------------------------------------------------  Stress management:  Understanding Your Stress  Think about how you know when you’re stressed.  Think about how your thoughts or behaviors are different when you’re stressed.  Think about what triggers stress for you.  Think about how you handle stress, and whether you’re making unhealthy choices in response to stress (like smoking, drinking alcohol or overeating).  Managing Stress  Take a break from the stressful situation.  Reduce your stress levels by exercising, talking with family/friends, ensuring adequate sleep.  Consider meditation or yoga.  Make sure you plan time to do things you enjoy.  Let your PCP know if you’re having problems limiting your stress.  ----------------------------------------------------------------------------------------------------------------------  Controlling Your Blood Pressure  Maintain a normal weight (body mass index between 18.5 and 24.9).  Eat more fruit, vegetables and low-fat dairy.  Eat less saturated fat and total fat.  Lower your sodium (salt) intake.   Try to stay under 1500 mg per day, but if you cannot get your intake to be that low, at least lower it by 1000 mg.  Stay active.  Try to get at least 90 to 150 minutes of exercise per week.  Try brisk walking, swimming, bicycling or dancing.  Limit alcohol intake.  When you do consume alcohol, drink no more than 1 drink per day.  If you have been prescribed medication, take it regularly and exactly as prescribed.  Let your PCP know if you have any problems or questions about your medication.  Check your blood pressure at home or at the store.  Write down your readings and share them with your PCP  ----------------------------------------------------------------------------------------------------------------------  Controlling Your Cholesterol  Reduce the amount of saturated and trans fat in your diet.  Limit intake of red meat.  Consume only low-fat or non-fat/skim dairy.  Limit fried food.  Cook with vegetable oils.  Reduce your intake of sugary foods, sugary drinks and alcohol.  Eat a diet high in fruit, vegetables and whole grains.  Get protein from fish, poultry and a small portion of nuts.  Stay active.  Try to get at least 90 to 150 minutes of exercise per week.  Try brisk walking, swimming, bicycling or dancing.  Maintain a healthy weight by balancing your diet and exercise.  If you have been prescribed medication, take it regularly and exactly as prescribed. Let your PCP know if you have any problems or questions about your medication.  It’s important to know your cholesterol numbers.  When recommended by your PCP, get the cholesterol blood test.  ----------------------------------------------------------------------------------------------------------------------  Maintaining Strong Bones  Try to get at least 90 to 150 minutes of weight-bearing exercise per week.  Ensure intake of at least 1200mg of calcium per day.  Eat foods high in calcium like milk and other dairy, green vegetables, fruit, canned fish  with soft and edible bones, nuts, calcium-set tofu.  Some foods are calcium-fortified, like bread, cereal, fruit juices and mineral water.  Help your body make vitamin D by getting 10-15 minutes per day of sunlight.    Ensure intake of at least 600IU of vitamin D per day.  Eat foods high in vitamin D like oily fish (salmon, sardines, mackerel) and eggs.  Some foods are fortified with vitamin D, like dairy and cereals.  Avoid high amounts of caffeine and salt, since they can cause the body to loose calcium.  Limit alcohol intake, since it is associated with weaker bones and is associated with falls and fractures.  Limit intake of fizzy drinks.  ----------------------------------------------------------------------------------------------------------------------  Reducing Your Risk of Falls  Tell your PCP if any of your medications make you feel tired, dizzy, lightheaded or off-balance.  Maintain coordination, flexibility and balance by ensuring regular physical activity.  Limit alcohol intake to 1 drink per day.  Consider avoiding all alcohol intake.  Ensure good vision.  Visit an ophthalmologist or optometrist regularly for vision screening or to make sure your glasses / contact lens prescription is correct.  If you need glasses or contacts, wear them.  When you get new glasses or contacts, take time to get used to them.  Do not wear sunglasses or tinted lenses when indoors.  Ensure good hearing.  Have your hearing checked if you are having trouble hearing, or family and friends think you cannot hear them.  If you need a hearing aid, be sure it fits well and wear it.  Get enough rest.  Ensure about 7-9 hours of sleep every day.  Get up slowly from your bed or chairs.  Do not start walking until you are sure you feel steady.  Wear non-skid, rubber-soled, low-heeled shoes.  Do not walk in socks, or in shoes and slippers with smooth soles.  If your PCP or therapist recommends using a cane or walker, use it  regularly.  Make your home safer.  Increase lighting throughout the house, especially at the top and bottom of stairs.  Ensure lighting is easily turned on when getting up in the middle of the night.  Make sure there are two secure rails on all stairs.  Install grab bars in the bathtub / shower and near the toilet.  Consider using a shower chair and / or a hand-held shower.  Spread sand or salt on icy surfaces.  Beware of wet surfaces, which can be icy.  Tell your PCP if you have fallen.

## 2025-08-04 LAB
HCV RNA SERPL NAA+PROBE-ACNC: NORMAL IU/ML
HCV RNA SERPL NAA+PROBE-LOG IU: NORMAL LOG IU/ML

## 2025-08-05 ENCOUNTER — HOSPITAL ENCOUNTER (OUTPATIENT)
Dept: RADIOLOGY | Age: 70
Discharge: HOME | End: 2025-08-05
Attending: FAMILY MEDICINE
Payer: MEDICARE

## 2025-08-05 DIAGNOSIS — R76.8 HEPATITIS C ANTIBODY DETECTED: ICD-10-CM

## 2025-08-05 DIAGNOSIS — K76.0 FATTY LIVER: ICD-10-CM

## 2025-08-05 PROCEDURE — 76700 US EXAM ABDOM COMPLETE: CPT

## 2025-08-06 ENCOUNTER — TELEPHONE (OUTPATIENT)
Dept: PRIMARY CARE | Facility: CLINIC | Age: 70
End: 2025-08-06
Payer: MEDICARE

## 2025-08-06 NOTE — TELEPHONE ENCOUNTER
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

## (undated) DEVICE — MOUTHPIECE 60FR ENDO BITEBLOCK

## (undated) DEVICE — FORCEP BIOPSY DISP ENDOJAW 230CM

## (undated) DEVICE — CONNECTOR PORT W/VALVE FOR OLYMPUS 160/180 SCOPE